# Patient Record
Sex: FEMALE | Race: WHITE | Employment: OTHER | URBAN - METROPOLITAN AREA
[De-identification: names, ages, dates, MRNs, and addresses within clinical notes are randomized per-mention and may not be internally consistent; named-entity substitution may affect disease eponyms.]

---

## 2017-12-07 ENCOUNTER — OFFICE VISIT (OUTPATIENT)
Dept: INTERNAL MEDICINE CLINIC | Age: 82
End: 2017-12-07

## 2017-12-07 VITALS
HEART RATE: 67 BPM | RESPIRATION RATE: 14 BRPM | DIASTOLIC BLOOD PRESSURE: 78 MMHG | OXYGEN SATURATION: 98 % | SYSTOLIC BLOOD PRESSURE: 190 MMHG | HEIGHT: 56 IN | BODY MASS INDEX: 28.12 KG/M2 | TEMPERATURE: 97.8 F | WEIGHT: 125 LBS

## 2017-12-07 DIAGNOSIS — R53.83 FATIGUE, UNSPECIFIED TYPE: ICD-10-CM

## 2017-12-07 DIAGNOSIS — I10 ESSENTIAL HYPERTENSION: Primary | ICD-10-CM

## 2017-12-07 DIAGNOSIS — G45.9 TRANSIENT CEREBRAL ISCHEMIA, UNSPECIFIED TYPE: ICD-10-CM

## 2017-12-07 DIAGNOSIS — M81.0 AGE-RELATED OSTEOPOROSIS WITHOUT CURRENT PATHOLOGICAL FRACTURE: ICD-10-CM

## 2017-12-07 RX ORDER — ASPIRIN 325 MG
TABLET, DELAYED RELEASE (ENTERIC COATED) ORAL
COMMUNITY
Start: 2017-11-21 | End: 2018-05-29 | Stop reason: SDUPTHER

## 2017-12-07 RX ORDER — IPRATROPIUM BROMIDE 42 UG/1
SPRAY, METERED NASAL
COMMUNITY
Start: 2017-09-10 | End: 2017-12-07 | Stop reason: ALTCHOICE

## 2017-12-07 RX ORDER — CHOLECALCIFEROL (VITAMIN D3) 125 MCG
CAPSULE ORAL DAILY
COMMUNITY

## 2017-12-07 RX ORDER — ATORVASTATIN CALCIUM 20 MG/1
20 TABLET, FILM COATED ORAL DAILY
Qty: 90 TAB | Refills: 3 | Status: SHIPPED | OUTPATIENT
Start: 2017-12-07 | End: 2018-11-18 | Stop reason: SDUPTHER

## 2017-12-07 RX ORDER — VALSARTAN 80 MG/1
80 TABLET ORAL DAILY
COMMUNITY
Start: 2017-11-14 | End: 2017-12-07 | Stop reason: SDUPTHER

## 2017-12-07 RX ORDER — VALSARTAN 160 MG/1
160 TABLET ORAL DAILY
Qty: 90 TAB | Refills: 3 | Status: SHIPPED | OUTPATIENT
Start: 2017-12-07 | End: 2018-07-12 | Stop reason: SDUPTHER

## 2017-12-07 RX ORDER — MINERAL OIL
180 ENEMA (ML) RECTAL DAILY
COMMUNITY
Start: 2017-11-01 | End: 2018-07-12

## 2017-12-07 NOTE — MR AVS SNAPSHOT
Visit Information Date & Time Provider Department Dept. Phone Encounter #  
 12/7/2017 11:00 AM Sharifa Addison MD Lanette Aguilera [phone] [id number] Follow-up Instructions Return in about 1 month (around 1/7/2018) for bp. Upcoming Health Maintenance Date Due DTaP/Tdap/Td series (1 - Tdap) 6/19/1951 ZOSTER VACCINE AGE 60> 4/19/1990 GLAUCOMA SCREENING Q2Y 6/19/1995 Pneumococcal 65+ Low/Medium Risk (1 of 2 - PCV13) 6/19/1995 MEDICARE YEARLY EXAM 6/19/1995 Allergies as of 12/7/2017  Review Complete On: 12/7/2017 By: Sharifa Addison MD  
 No Known Allergies Current Immunizations  Reviewed on 3/11/2013 No immunizations on file. Not reviewed this visit You Were Diagnosed With   
  
 Codes Comments BMI 28.0-28.9,adult    -  Primary ICD-10-CM: H60.31 
ICD-9-CM: V85.24 Transient cerebral ischemia, unspecified type     ICD-10-CM: G45.9 ICD-9-CM: 435.9 Essential hypertension     ICD-10-CM: I10 
ICD-9-CM: 401.9 Age-related osteoporosis without current pathological fracture     ICD-10-CM: M81.0 ICD-9-CM: 733.01 Fatigue, unspecified type     ICD-10-CM: R53.83 ICD-9-CM: 780.79 Vitals BP Pulse Temp Resp Height(growth percentile) Weight(growth percentile) 190/78 67 97.8 °F (36.6 °C) (Oral) 14 4' 8\" (1.422 m) 125 lb (56.7 kg) SpO2 BMI OB Status Smoking Status 98% 28.02 kg/m2 Postmenopausal Never Smoker Vitals History BMI and BSA Data Body Mass Index Body Surface Area 28.02 kg/m 2 1.5 m 2 Preferred Pharmacy Pharmacy Name Phone [address] [facility] [phone] Your Updated Medication List  
  
   
This list is accurate as of: 12/7/17 12:27 PM.  Always use your most recent med list.  
  
  
  
  
 aspirin delayed-release 325 mg tablet  
  
 atorvastatin 20 mg tablet Commonly known as:  LIPITOR Take 1 Tab by mouth daily. BYSTOLIC 5 mg tablet Generic drug:  nebivolol Take 5 mg by mouth daily. calcium citrate 200 mg (950 mg) tablet Take 600 mg by mouth daily. CeleBREX 200 mg capsule Generic drug:  celecoxib Take 200 mg by mouth daily. fexofenadine 180 mg tablet Commonly known as:  Selvin Frank Take 180 mg by mouth daily. LASIX 20 mg tablet Generic drug:  furosemide Take 20 mg by mouth as needed. Indications: EDEMA  
  
 omeprazole 20 mg capsule Commonly known as:  PRILOSEC Take 20 mg by mouth daily. valsartan 160 mg tablet Commonly known as:  DIOVAN Take 1 Tab by mouth daily. VITAMIN D3 2,000 unit Tab Generic drug:  cholecalciferol (vitamin D3) Take  by mouth daily. Prescriptions Sent to Pharmacy Refills  
 atorvastatin (LIPITOR) 20 mg tablet 3 Sig: Take 1 Tab by mouth daily. Class: Normal  
 Pharmacy: [address], [address] Ph #: [phone] Route: Oral  
 valsartan (DIOVAN) 160 mg tablet 3 Sig: Take 1 Tab by mouth daily. Class: Normal  
 Pharmacy: [address], [address] Ph #: [phone] Route: Oral  
  
Follow-up Instructions Return in about 1 month (around 1/7/2018) for bp. To-Do List   
 12/08/2017 Lab:  CBC WITH AUTOMATED DIFF   
  
 12/08/2017 Lab:  LIPID PANEL   
  
 12/08/2017 Lab:  METABOLIC PANEL, COMPREHENSIVE   
  
 12/08/2017 Lab:  TSH 3RD GENERATION Patient Instructions High Blood Pressure: Care Instructions Your Care Instructions If your blood pressure is usually above 140/90, you have high blood pressure, or hypertension. That means the top number is 140 or higher or the bottom number is 90 or higher, or both.  
Despite what a lot of people think, high blood pressure usually doesn't cause headaches or make you feel dizzy or lightheaded. It usually has no symptoms. But it does increase your risk for heart attack, stroke, and kidney or eye damage. The higher your blood pressure, the more your risk increases. Your doctor will give you a goal for your blood pressure. Your goal will be based on your health and your age. An example of a goal is to keep your blood pressure below 140/90. Lifestyle changes, such as eating healthy and being active, are always important to help lower blood pressure. You might also take medicine to reach your blood pressure goal. 
Follow-up care is a key part of your treatment and safety. Be sure to make and go to all appointments, and call your doctor if you are having problems. It's also a good idea to know your test results and keep a list of the medicines you take. How can you care for yourself at home? Medical treatment · If you stop taking your medicine, your blood pressure will go back up. You may take one or more types of medicine to lower your blood pressure. Be safe with medicines. Take your medicine exactly as prescribed. Call your doctor if you think you are having a problem with your medicine. · Talk to your doctor before you start taking aspirin every day. Aspirin can help certain people lower their risk of a heart attack or stroke. But taking aspirin isn't right for everyone, because it can cause serious bleeding. · See your doctor regularly. You may need to see the doctor more often at first or until your blood pressure comes down. · If you are taking blood pressure medicine, talk to your doctor before you take decongestants or anti-inflammatory medicine, such as ibuprofen. Some of these medicines can raise blood pressure. · Learn how to check your blood pressure at home. Lifestyle changes · Stay at a healthy weight. This is especially important if you put on weight around the waist. Losing even 10 pounds can help you lower your blood pressure. · If your doctor recommends it, get more exercise. Walking is a good choice. Bit by bit, increase the amount you walk every day. Try for at least 30 minutes on most days of the week. You also may want to swim, bike, or do other activities. · Avoid or limit alcohol. Talk to your doctor about whether you can drink any alcohol. · Try to limit how much sodium you eat to less than 2,300 milligrams (mg) a day. Your doctor may ask you to try to eat less than 1,500 mg a day. · Eat plenty of fruits (such as bananas and oranges), vegetables, legumes, whole grains, and low-fat dairy products. · Lower the amount of saturated fat in your diet. Saturated fat is found in animal products such as milk, cheese, and meat. Limiting these foods may help you lose weight and also lower your risk for heart disease. · Do not smoke. Smoking increases your risk for heart attack and stroke. If you need help quitting, talk to your doctor about stop-smoking programs and medicines. These can increase your chances of quitting for good. When should you call for help? Call 911 anytime you think you may need emergency care. This may mean having symptoms that suggest that your blood pressure is causing a serious heart or blood vessel problem. Your blood pressure may be over 180/110. ? For example, call 911 if: 
? · You have symptoms of a heart attack. These may include: ¨ Chest pain or pressure, or a strange feeling in the chest. 
¨ Sweating. ¨ Shortness of breath. ¨ Nausea or vomiting. ¨ Pain, pressure, or a strange feeling in the back, neck, jaw, or upper belly or in one or both shoulders or arms. ¨ Lightheadedness or sudden weakness. ¨ A fast or irregular heartbeat. ? · You have symptoms of a stroke. These may include: 
¨ Sudden numbness, tingling, weakness, or loss of movement in your face, arm, or leg, especially on only one side of your body. ¨ Sudden vision changes. ¨ Sudden trouble speaking. ¨ Sudden confusion or trouble understanding simple statements. ¨ Sudden problems with walking or balance. ¨ A sudden, severe headache that is different from past headaches. ? · You have severe back or belly pain. ?Do not wait until your blood pressure comes down on its own. Get help right away. ?Call your doctor now or seek immediate care if: 
? · Your blood pressure is much higher than normal (such as 180/110 or higher), but you don't have symptoms. ? · You think high blood pressure is causing symptoms, such as: ¨ Severe headache. ¨ Blurry vision. ? Watch closely for changes in your health, and be sure to contact your doctor if: 
? · Your blood pressure measures 140/90 or higher at least 2 times. That means the top number is 140 or higher or the bottom number is 90 or higher, or both. ? · You think you may be having side effects from your blood pressure medicine. ? · Your blood pressure is usually normal, but it goes above normal at least 2 times. Where can you learn more? Go to http://zari-ivory.info/. Enter F967 in the search box to learn more about \"High Blood Pressure: Care Instructions. \" Current as of: September 21, 2016 Content Version: 11.4 © 9823-2464 [employer]. Care instructions adapted under license by [employer] (which disclaims liability or warranty for this information). If you have questions about a medical condition or this instruction, always ask your healthcare professional. [place] [address] any warranty or liability for your use of this information. Introducing [facility]! Dear Shaggy Waldrop: 
Thank you for requesting a [employer] account. Our records indicate that you have previously registered for a [employer] account but its currently inactive. Please call our [employer] support line at [phone]. Additional Information If you have questions, please visit the Frequently Asked Questions section of the [employer]TV website at [email or web address] Remember, tweetTV is NOT to be used for urgent needs. For medical emergencies, dial 911. Now available from your iPhone and Android! Please provide this summary of care documentation to your next provider. Your primary care clinician is listed as Mery Aden. If you have any questions after today's visit, please call [phone].

## 2017-12-07 NOTE — PATIENT INSTRUCTIONS
High Blood Pressure: Care Instructions  Your Care Instructions    If your blood pressure is usually above 140/90, you have high blood pressure, or hypertension. That means the top number is 140 or higher or the bottom number is 90 or higher, or both. Despite what a lot of people think, high blood pressure usually doesn't cause headaches or make you feel dizzy or lightheaded. It usually has no symptoms. But it does increase your risk for heart attack, stroke, and kidney or eye damage. The higher your blood pressure, the more your risk increases. Your doctor will give you a goal for your blood pressure. Your goal will be based on your health and your age. An example of a goal is to keep your blood pressure below 140/90. Lifestyle changes, such as eating healthy and being active, are always important to help lower blood pressure. You might also take medicine to reach your blood pressure goal.  Follow-up care is a key part of your treatment and safety. Be sure to make and go to all appointments, and call your doctor if you are having problems. It's also a good idea to know your test results and keep a list of the medicines you take. How can you care for yourself at home? Medical treatment  · If you stop taking your medicine, your blood pressure will go back up. You may take one or more types of medicine to lower your blood pressure. Be safe with medicines. Take your medicine exactly as prescribed. Call your doctor if you think you are having a problem with your medicine. · Talk to your doctor before you start taking aspirin every day. Aspirin can help certain people lower their risk of a heart attack or stroke. But taking aspirin isn't right for everyone, because it can cause serious bleeding. · See your doctor regularly. You may need to see the doctor more often at first or until your blood pressure comes down.   · If you are taking blood pressure medicine, talk to your doctor before you take decongestants or anti-inflammatory medicine, such as ibuprofen. Some of these medicines can raise blood pressure. · Learn how to check your blood pressure at home. Lifestyle changes  · Stay at a healthy weight. This is especially important if you put on weight around the waist. Losing even 10 pounds can help you lower your blood pressure. · If your doctor recommends it, get more exercise. Walking is a good choice. Bit by bit, increase the amount you walk every day. Try for at least 30 minutes on most days of the week. You also may want to swim, bike, or do other activities. · Avoid or limit alcohol. Talk to your doctor about whether you can drink any alcohol. · Try to limit how much sodium you eat to less than 2,300 milligrams (mg) a day. Your doctor may ask you to try to eat less than 1,500 mg a day. · Eat plenty of fruits (such as bananas and oranges), vegetables, legumes, whole grains, and low-fat dairy products. · Lower the amount of saturated fat in your diet. Saturated fat is found in animal products such as milk, cheese, and meat. Limiting these foods may help you lose weight and also lower your risk for heart disease. · Do not smoke. Smoking increases your risk for heart attack and stroke. If you need help quitting, talk to your doctor about stop-smoking programs and medicines. These can increase your chances of quitting for good. When should you call for help? Call 911 anytime you think you may need emergency care. This may mean having symptoms that suggest that your blood pressure is causing a serious heart or blood vessel problem. Your blood pressure may be over 180/110. ? For example, call 911 if:  ? · You have symptoms of a heart attack. These may include:  ¨ Chest pain or pressure, or a strange feeling in the chest.  ¨ Sweating. ¨ Shortness of breath. ¨ Nausea or vomiting.   ¨ Pain, pressure, or a strange feeling in the back, neck, jaw, or upper belly or in one or both shoulders or arms.  ¨ Lightheadedness or sudden weakness. ¨ A fast or irregular heartbeat. ? · You have symptoms of a stroke. These may include:  ¨ Sudden numbness, tingling, weakness, or loss of movement in your face, arm, or leg, especially on only one side of your body. ¨ Sudden vision changes. ¨ Sudden trouble speaking. ¨ Sudden confusion or trouble understanding simple statements. ¨ Sudden problems with walking or balance. ¨ A sudden, severe headache that is different from past headaches. ? · You have severe back or belly pain. ?Do not wait until your blood pressure comes down on its own. Get help right away. ?Call your doctor now or seek immediate care if:  ? · Your blood pressure is much higher than normal (such as 180/110 or higher), but you don't have symptoms. ? · You think high blood pressure is causing symptoms, such as:  ¨ Severe headache. ¨ Blurry vision. ? Watch closely for changes in your health, and be sure to contact your doctor if:  ? · Your blood pressure measures 140/90 or higher at least 2 times. That means the top number is 140 or higher or the bottom number is 90 or higher, or both. ? · You think you may be having side effects from your blood pressure medicine. ? · Your blood pressure is usually normal, but it goes above normal at least 2 times. Where can you learn more? Go to http://zari-ivory.info/. Enter U350 in the search box to learn more about \"High Blood Pressure: Care Instructions. \"  Current as of: September 21, 2016  Content Version: 11.4  © 0066-2720 [email or web address]. Care instructions adapted under license by [employer] (which disclaims liability or warranty for this information). If you have questions about a medical condition or this instruction, always ask your healthcare professional. [place] [address] any warranty or liability for your use of this information.

## 2017-12-07 NOTE — PROGRESS NOTES
Juliane Bui  Identified pt with two pt identifiers(name and ). Chief Complaint   Patient presents with   [id number]// NON fasting // Dr Maurilio Degroot, prior PCP    Headache       1. Have you been to the ER, urgent care clinic since your last visit? Hospitalized since your last visit? NO    2. Have you seen or consulted any other health care providers outside of the [address] [place] [address] since your last visit? Dr Buffy Thomas, eye  Hx aneurysm R eye    Dr Basia Graham notified of reason for visit and vitals    Patient received paperwork for advance directive during previous visit but has not completed at this time     Reviewed record In preparation for visit, huddled with provider and have obtained necessary documentation      Health Maintenance Due   Topic    DTaP/Tdap/Td series (1 - Tdap)    ZOSTER VACCINE AGE 60>     GLAUCOMA SCREENING Q2Y     Pneumococcal 65+ Low/Medium Risk (1 of 2 - PCV13)    MEDICARE YEARLY EXAM        Wt Readings from Last 3 Encounters:   17 125 lb (56.7 kg)   11/10/15 114 lb (51.7 kg)   14 114 lb (51.7 kg)     Temp Readings from Last 3 Encounters:   14 98 °F (36.7 °C)   13 98.8 °F (37.1 °C)   12 97.9 °F (36.6 °C)     BP Readings from Last 3 Encounters:   11/10/15 175/72   14 151/54   13 117/59     Pulse Readings from Last 3 Encounters:   11/10/15 93   14 76   13 (!) 109         Learning Assessment:  :     No flowsheet data found. Depression Screening:  :     No flowsheet data found. Fall Risk Assessment:  :     No flowsheet data found. Abuse Screening:  :     No flowsheet data found. Patient is accompanied by daughter. I have received verbal consent from [address] [place] [address] to discuss any/all medical information while they are present in the room. Medication reconciliation up to date and corrected with patient at this time.

## 2017-12-07 NOTE — PROGRESS NOTES
HPI  Ms. Inessa Andujar is a 80y.o. year old female, she is seen today to establish care, accompanied by her daughter. PMH significant for TIA, HTN. TIA was manifest as numbness around mouth - resolved now and hasn't returned. Says has CARMEN for years, resolves with resting. No chest pain associated and no diaphoresis, nausea either. Has pain in low back b/l and ribs for years, takes tylenol rarely which helps pain. Pain is improved since extensive back surgery. Has had more HA in last week. HA feels like a band around her head, no vision changes that are new but had \"aneurysm\" right eye - diagnosed around  - followed by Dr. Devon Bacon - routine eye exams with Dr. Buffy Thomas. Will take tylenol or excedrin prn - may need for 2-3 days in a row then not again for a couple of weeks. Worry will bring on HA. Especially worries about her  with parkinson's, often aspirates and this is stressful. BP at home is in range 140-170/. Has had atorvastatin in the past - . Seems this was started after diagnosis TIA. Has been  79 years. Has heartburn intermittently- worse with certain foods. Has gained 10# in the last year since she moved in with her daughter as she is eating better   Coughs frequently for years, unchanged, also postnasal drip. May feel tired, no focal weakness. Only takes lasix rarely for swelling in legs - none recently. Chief Complaint   Patient presents with   [id number]// NON fasting // Dr Maurilio Degroot, prior PCP (records requested)     Headache        Prior to Admission medications    Medication Sig Start Date End Date Taking? Authorizing Provider   fexofenadine (ALLEGRA) 180 mg tablet Take 180 mg by mouth daily. 17  Yes Historical Provider   aspirin delayed-release 325 mg tablet  17  Yes Historical Provider   valsartan (DIOVAN) 80 mg tablet Take 80 mg by mouth daily.  17  Yes Historical Provider   cholecalciferol, vitamin D3, (VITAMIN D3) 2,000 unit tab Take  by mouth daily. Yes Historical Provider   celecoxib (CELEBREX) 200 mg capsule Take 200 mg by mouth daily. Yes Historical Provider   calcium citrate 200 mg (950 mg) tablet Take 600 mg by mouth daily. Yes Historical Provider   alendronate (FOSAMAX) 70 mg tablet Take 70 mg by mouth every Sunday. Yes Historical Provider   nebivolol (BYSTOLIC) 5 mg tablet Take 5 mg by mouth daily. Yes Historical Provider   omeprazole (PRILOSEC) 20 mg capsule Take 20 mg by mouth daily. Yes Historical Provider   furosemide (LASIX) 20 mg tablet Take 20 mg by mouth as needed. Indications: EDEMA   Yes Historical Provider   HYDROcodone-acetaminophen (NORCO) 5-325 mg per tablet Take 1 Tab by mouth every four (4) hours as needed. 3/26/14   Kae Taveras NP         No Known Allergies      REVIEW OF SYSTEMS:  Per HPI    PHYSICAL EXAM:  Visit Vitals    /78    Pulse 67    Temp 97.8 °F (36.6 °C) (Oral)    Resp 14    Ht 4' 8\" (1.422 m)    Wt 125 lb (56.7 kg)    SpO2 98%    BMI 28.02 kg/m2     Constitutional: Appears well-developed and well-nourished. No distress. HENT:   Head: Normocephalic and atraumatic. Eyes: No scleral icterus. PERRL  Mouth: OP clear without lesions, no pharyngeal exudate  Neck: no lad, no tm, supple   Cardiovascular: Normal S1/S2, regular rhythm. No murmurs, rubs, or gallops. Pulmonary/Chest: Effort normal and breath sounds normal. No respiratory distress. No wheezes, rhonchi, or rales. Abdomen: Soft, NT/ND, +BS, no rebound or guarding, no masses, no HSM appreciated. Back: no pain on palpation, +well healed vertical scar  Ext: No edema. 2+ pedal pulses. Neurological: Alert. Strength 5/5 b/l UE and LE. Psychiatric: Normal mood and affect.  Behavior is normal.     Lab Results   Component Value Date/Time    Sodium 139 03/26/2014 05:00 AM    Potassium 4.1 03/26/2014 05:00 AM    Chloride 107 03/26/2014 05:00 AM    CO2 23 03/26/2014 05:00 AM    Anion gap 9 03/26/2014 05:00 AM    Glucose 131 03/26/2014 05:00 AM    BUN 17 03/26/2014 05:00 AM    Creatinine 0.42 03/26/2014 05:00 AM    BUN/Creatinine ratio 40 03/26/2014 05:00 AM    GFR est AA >60 03/26/2014 05:00 AM    GFR est non-AA >60 03/26/2014 05:00 AM    Calcium 8.1 03/26/2014 05:00 AM    Bilirubin, total 1.2 03/12/2013 03:45 AM    AST (SGOT) 26 03/12/2013 03:45 AM    Alk. phosphatase 99 03/12/2013 03:45 AM    Protein, total 5.6 03/12/2013 03:45 AM    Albumin 2.3 03/12/2013 03:45 AM    Globulin 3.3 03/12/2013 03:45 AM    A-G Ratio 0.7 03/12/2013 03:45 AM    ALT (SGPT) 28 03/12/2013 03:45 AM     Lab Results   Component Value Date/Time    Hemoglobin A1c 5.1 03/07/2014 01:35 PM    Hemoglobin A1c 5.4 02/25/2013 02:09 PM      No results found for: CHOL, CHOLPOCT, CHOLX, CHLST, CHOLV, HDL, HDLPOC, LDL, LDLCPOC, LDLC, DLDLP, VLDLC, VLDL, TGLX, TRIGL, TRIGP, TGLPOCT, CHHD, CHHDX       ASSESSMENT/PLAN  Diagnoses and all orders for this visit:    1. Essential hypertension  -     METABOLIC PANEL, COMPREHENSIVE; Future  -     LIPID PANEL; Future  -     valsartan (DIOVAN) 160 mg tablet; Take 1 Tab by mouth daily. Not controlled, increase diovan as above - with h/o TIA needs good BP control - also restart lipitor to prevent recurrence TIA  2. Transient cerebral ischemia, unspecified type  -     atorvastatin (LIPITOR) 20 mg tablet; Take 1 Tab by mouth daily. 3. BMI 28.0-28.9,adult  Acceptable for age  3. Age-related osteoporosis without current pathological fracture    5. Fatigue, unspecified type  -     CBC WITH AUTOMATED DIFF; Future  -     TSH 3RD GENERATION;  Future  Check labs, may be due to high BP, age        Health Maintenance Due   Topic Date Due    DTaP/Tdap/Td series (1 - Tdap) 06/19/1951    ZOSTER VACCINE AGE 60>  04/19/1990    GLAUCOMA SCREENING Q2Y  06/19/1995    Pneumococcal 65+ Low/Medium Risk (1 of 2 - PCV13) 06/19/1995    MEDICARE YEARLY EXAM  06/19/1995        Follow-up Disposition:  Return in about 1 month (around 1/7/2018) for bp. Reviewed plan of care. Patient has provided input and agrees with goals. The nurse provided the patient and/or family with advanced directive information if needed and encouraged the patient to provide a copy to the office when available.

## 2017-12-13 ENCOUNTER — APPOINTMENT (OUTPATIENT)
Dept: INTERNAL MEDICINE CLINIC | Age: 82
End: 2017-12-13

## 2017-12-13 DIAGNOSIS — I10 ESSENTIAL HYPERTENSION: ICD-10-CM

## 2017-12-13 DIAGNOSIS — R53.83 FATIGUE, UNSPECIFIED TYPE: ICD-10-CM

## 2017-12-14 ENCOUNTER — PATIENT OUTREACH (OUTPATIENT)
Dept: INTERNAL MEDICINE CLINIC | Age: 82
End: 2017-12-14

## 2017-12-14 ENCOUNTER — OFFICE VISIT (OUTPATIENT)
Dept: INTERNAL MEDICINE CLINIC | Age: 82
End: 2017-12-14

## 2017-12-14 DIAGNOSIS — Z71.89 ADVANCE CARE PLANNING: Primary | ICD-10-CM

## 2017-12-14 LAB
ALBUMIN SERPL-MCNC: 4 G/DL (ref 3.5–4.7)
ALBUMIN/GLOB SERPL: 1.5 {RATIO} (ref 1.2–2.2)
ALP SERPL-CCNC: 88 IU/L (ref 39–117)
ALT SERPL-CCNC: 11 IU/L (ref 0–32)
AST SERPL-CCNC: 20 IU/L (ref 0–40)
BASOPHILS # BLD AUTO: 0.1 X10E3/UL (ref 0–0.2)
BASOPHILS NFR BLD AUTO: 1 %
BILIRUB SERPL-MCNC: 0.7 MG/DL (ref 0–1.2)
BUN SERPL-MCNC: 24 MG/DL (ref 8–27)
BUN/CREAT SERPL: 24 (ref 12–28)
CALCIUM SERPL-MCNC: 9.1 MG/DL (ref 8.7–10.3)
CHLORIDE SERPL-SCNC: 102 MMOL/L (ref 96–106)
CHOLEST SERPL-MCNC: 194 MG/DL (ref 100–199)
CO2 SERPL-SCNC: 27 MMOL/L (ref 18–29)
CREAT SERPL-MCNC: 1 MG/DL (ref 0.57–1)
EOSINOPHIL # BLD AUTO: 0.3 X10E3/UL (ref 0–0.4)
EOSINOPHIL NFR BLD AUTO: 5 %
ERYTHROCYTE [DISTWIDTH] IN BLOOD BY AUTOMATED COUNT: 13.1 % (ref 12.3–15.4)
GFR SERPLBLD CREATININE-BSD FMLA CKD-EPI: 51 ML/MIN/1.73
GFR SERPLBLD CREATININE-BSD FMLA CKD-EPI: 59 ML/MIN/1.73
GLOBULIN SER CALC-MCNC: 2.6 G/DL (ref 1.5–4.5)
GLUCOSE SERPL-MCNC: 90 MG/DL (ref 65–99)
HCT VFR BLD AUTO: 36.6 % (ref 34–46.6)
HDLC SERPL-MCNC: 70 MG/DL
HGB BLD-MCNC: 11.6 G/DL (ref 11.1–15.9)
IMM GRANULOCYTES # BLD: 0 X10E3/UL (ref 0–0.1)
IMM GRANULOCYTES NFR BLD: 0 %
INTERPRETATION, 910389: NORMAL
INTERPRETATION: NORMAL
LDLC SERPL CALC-MCNC: 104 MG/DL (ref 0–99)
LYMPHOCYTES # BLD AUTO: 1.4 X10E3/UL (ref 0.7–3.1)
LYMPHOCYTES NFR BLD AUTO: 25 %
MCH RBC QN AUTO: 30.1 PG (ref 26.6–33)
MCHC RBC AUTO-ENTMCNC: 31.7 G/DL (ref 31.5–35.7)
MCV RBC AUTO: 95 FL (ref 79–97)
MONOCYTES # BLD AUTO: 0.6 X10E3/UL (ref 0.1–0.9)
MONOCYTES NFR BLD AUTO: 11 %
NEUTROPHILS # BLD AUTO: 3.3 X10E3/UL (ref 1.4–7)
NEUTROPHILS NFR BLD AUTO: 58 %
PDF IMAGE, 910387: NORMAL
PLATELET # BLD AUTO: 213 X10E3/UL (ref 150–379)
POTASSIUM SERPL-SCNC: 4.7 MMOL/L (ref 3.5–5.2)
PROT SERPL-MCNC: 6.6 G/DL (ref 6–8.5)
RBC # BLD AUTO: 3.85 X10E6/UL (ref 3.77–5.28)
SODIUM SERPL-SCNC: 142 MMOL/L (ref 134–144)
TRIGL SERPL-MCNC: 101 MG/DL (ref 0–149)
TSH SERPL DL<=0.005 MIU/L-ACNC: 3.02 UIU/ML (ref 0.45–4.5)
VLDLC SERPL CALC-MCNC: 20 MG/DL (ref 5–40)
WBC # BLD AUTO: 5.7 X10E3/UL (ref 3.4–10.8)

## 2017-12-14 NOTE — ACP (ADVANCE CARE PLANNING)
[facility]     Prior to today's conversation, did individual have existing ACP documents? [] Yes  [x] No  I  Date of conversation: 12/14/17 Location: [facility]    Participants:   [x] Patient    [x] Prospective agent (not yet named in a document) / Other surrogate decision maker / next of kin    Name: Burton Buckner    Relationship to patient: daughter    Phone number: [phone]    Other persons present:   Name: Meaghan Samantha    Relationship to patient:    Name: Lennie Adams    Relationship to patient: friend    Individual's Fears/Concerns about planning: none     Goals/Narrative of Conversation: Discussed facilitators role and purpose of the program.  Discussed ACP planning and what it is about along with the patient's understanding of what ACP planning is. Explored and discussed the patient's experiences with family who were seriously ill or injured. Explored and discussed living well and what that means to the patient. Provided a brief overview of the 3 decisions in First Steps ACP. Explored and discussed cultural, Congregational, and personal beliefs that the patient has. Discussed CPR and mechanical ventilation and patient gave their views of how they felt about them. Explored and discussed scenario regarding the patient's goals of care for a severe permanent brain injury. Discussed anatomical gifts with the patient. Explained that in any scenario or decision that is made, the patient will always be made comfortable. Clarified any questions and concerns that the patient had. Conversation topics for Individual    Has learned the following from prior experiences with serious illness: sister was sick, but was alert enough to maker her own decision to withdraw treatment. Identifies the following as important for living well: being able to perform ADL's independently and get around without any issues.     \"What cultural, Congregational, spiritual, or personal beliefs (if any) do you have that might help you choose the care you want, or do not want? \"  Patient response: none    \"Would you like to talk to someone about these beliefs or concerns? \"  Patient response: no      Conversation topics for Agent / Prospective Agent    Understanding of the role of healthcare agent: discussed roles of the agent and the expectations are.     Agent confirms Willingness to:   [x]Yes []No Accept role   [x] Yes []No Talk with individual about his/her goals, values, & preferences   [x] Yes [] No   Follow individual's decisions, even if do not agree with those    decisions   [x]Yes  []No Make decisions in difficult moments    Other questions/concerns about role of agent: none      First Steps® ACP Facilitator: Thai Cason RN    Length (minutes): 105    The following was provided (check all that apply):   [x] Clarification of the goals of ACP    [x] Criteria for choosing a healthcare agent   [x] Written information on the planning process      Healthcare Decision Information Cards:   [x] Help with Breathing Facts   [x] Tube Feeding Facts   [x] CPR Facts      [x] Review of the completion of the advance directive    [] Review of existing advance directive       Meeting Outcomes:   [x] ACP discussion completed   [x] Advance directive form completed   [x] Original of completed advance directive given to patient   [x] Copy given to healthcare agent    [x] Copy scanned to electronic medical record     Follow-up plan:     [] Schedule follow-up conversation to continue planning   [] Referred individual to provider for additional questions/concerns    [] Individual will invite agent/prospective agent to next ACP appointment   [x] Recommended to review completed AMD annually or upon change in health status     [x] This note routed to one or more involved healthcare providers

## 2018-01-16 ENCOUNTER — OFFICE VISIT (OUTPATIENT)
Dept: INTERNAL MEDICINE CLINIC | Age: 83
End: 2018-01-16

## 2018-01-16 VITALS
WEIGHT: 125.2 LBS | RESPIRATION RATE: 14 BRPM | TEMPERATURE: 97.6 F | HEIGHT: 56 IN | OXYGEN SATURATION: 95 % | HEART RATE: 67 BPM | SYSTOLIC BLOOD PRESSURE: 188 MMHG | DIASTOLIC BLOOD PRESSURE: 80 MMHG | BODY MASS INDEX: 28.16 KG/M2

## 2018-01-16 DIAGNOSIS — K21.9 GASTROESOPHAGEAL REFLUX DISEASE WITHOUT ESOPHAGITIS: ICD-10-CM

## 2018-01-16 DIAGNOSIS — M81.0 AGE-RELATED OSTEOPOROSIS WITHOUT CURRENT PATHOLOGICAL FRACTURE: ICD-10-CM

## 2018-01-16 DIAGNOSIS — G45.9 TRANSIENT CEREBRAL ISCHEMIA, UNSPECIFIED TYPE: ICD-10-CM

## 2018-01-16 DIAGNOSIS — I10 ESSENTIAL HYPERTENSION: Primary | ICD-10-CM

## 2018-01-16 RX ORDER — OMEPRAZOLE 20 MG/1
20 CAPSULE, DELAYED RELEASE ORAL DAILY
Qty: 90 CAP | Refills: 3 | Status: SHIPPED | OUTPATIENT
Start: 2018-01-16 | End: 2019-01-13 | Stop reason: SDUPTHER

## 2018-01-16 RX ORDER — NEBIVOLOL 10 MG/1
10 TABLET ORAL DAILY
Qty: 90 TAB | Refills: 1 | Status: SHIPPED | OUTPATIENT
Start: 2018-01-16 | End: 2018-07-15 | Stop reason: SDUPTHER

## 2018-01-16 NOTE — PROGRESS NOTES
HPI  Ms. Any Garcia is a 80y.o. year old female, she is seen today for follow up HTN after increasing losartan to 160mg daily. Brings log and BP at home has been 133-228/ with HR 59-70. Daughter notices shortness of breath when patient is walking up stairs, moving more than a few steps. No chest pain. Occasional HA, not changed and not associated with high bp. Had more leg swelling over the holidays, took lasix and improved but doesn't take daily. Brings her home bp machine which measures 203/79 when I get 188/80. Chief Complaint   Patient presents with    Blood Pressure Check     Room 1// NON fasting         Prior to Admission medications    Medication Sig Start Date End Date Taking? Authorizing Provider   fexofenadine (ALLEGRA) 180 mg tablet Take 180 mg by mouth daily. 11/1/17  Yes Historical Provider   aspirin delayed-release 325 mg tablet  11/21/17  Yes Historical Provider   cholecalciferol, vitamin D3, (VITAMIN D3) 2,000 unit tab Take  by mouth daily. Yes Historical Provider   atorvastatin (LIPITOR) 20 mg tablet Take 1 Tab by mouth daily. 12/7/17  Yes Nurys Cordon MD   valsartan (DIOVAN) 160 mg tablet Take 1 Tab by mouth daily. 12/7/17  Yes Nurys Cordon MD   celecoxib (CELEBREX) 200 mg capsule Take 200 mg by mouth daily. Yes Historical Provider   nebivolol (BYSTOLIC) 5 mg tablet Take 5 mg by mouth daily. Yes Historical Provider   omeprazole (PRILOSEC) 20 mg capsule Take 20 mg by mouth daily. Yes Historical Provider   furosemide (LASIX) 20 mg tablet Take 20 mg by mouth as needed. Indications: EDEMA   Yes Historical Provider   calcium citrate 200 mg (950 mg) tablet Take 600 mg by mouth daily.     Historical Provider         No Known Allergies      REVIEW OF SYSTEMS:  Per HPI    PHYSICAL EXAM:  Visit Vitals    /70 (BP 1 Location: Right arm, BP Patient Position: Sitting)    Pulse 67    Temp 97.6 °F (36.4 °C) (Oral)    Resp 14    Ht 4' 8\" (1.422 m)    Wt 125 lb 3.2 oz (56.8 kg)    SpO2 95%    BMI 28.07 kg/m2     Constitutional: Appears well-developed and well-nourished. No distress. HENT:   Head: Normocephalic and atraumatic. Eyes: No scleral icterus. Cardiovascular: Normal S1/S2, regular rhythm. No murmurs, rubs, or gallops. Pulmonary/Chest: Effort normal and breath sounds normal. No respiratory distress. No wheezes, rhonchi, or rales. Ext: trace left ankle edema. Neurological: Alert. Psychiatric: Normal mood and affect. Behavior is normal.     Lab Results   Component Value Date/Time    Sodium 142 12/13/2017 01:33 PM    Potassium 4.7 12/13/2017 01:33 PM    Chloride 102 12/13/2017 01:33 PM    CO2 27 12/13/2017 01:33 PM    Anion gap 9 03/26/2014 05:00 AM    Glucose 90 12/13/2017 01:33 PM    BUN 24 12/13/2017 01:33 PM    Creatinine 1.00 12/13/2017 01:33 PM    BUN/Creatinine ratio 24 12/13/2017 01:33 PM    GFR est AA 59 12/13/2017 01:33 PM    GFR est non-AA 51 12/13/2017 01:33 PM    Calcium 9.1 12/13/2017 01:33 PM    Bilirubin, total 0.7 12/13/2017 01:33 PM    AST (SGOT) 20 12/13/2017 01:33 PM    Alk. phosphatase 88 12/13/2017 01:33 PM    Protein, total 6.6 12/13/2017 01:33 PM    Albumin 4.0 12/13/2017 01:33 PM    Globulin 3.3 03/12/2013 03:45 AM    A-G Ratio 1.5 12/13/2017 01:33 PM    ALT (SGPT) 11 12/13/2017 01:33 PM     Lab Results   Component Value Date/Time    Hemoglobin A1c 5.1 03/07/2014 01:35 PM    Hemoglobin A1c 5.4 02/25/2013 02:09 PM      Lab Results   Component Value Date/Time    Cholesterol, total 194 12/13/2017 01:33 PM    HDL Cholesterol 70 12/13/2017 01:33 PM    LDL, calculated 104 12/13/2017 01:33 PM    VLDL, calculated 20 12/13/2017 01:33 PM    Triglyceride 101 12/13/2017 01:33 PM          ASSESSMENT/PLAN  Diagnoses and all orders for this visit:    1. Essential hypertension  -     nebivolol (BYSTOLIC) 10 mg tablet; Take 1 Tab by mouth daily.   Not controlled, her machine reads slightly higher than manual in office but still high - increase bystolic as above and take lasix daily as may have component of fluid overload as well  2. Age-related osteoporosis without current pathological fracture  On fosamax for greater than 5 years and recently stopped by me  3. Transient cerebral ischemia, unspecified type    4. Gastroesophageal reflux disease without esophagitis  -     omeprazole (PRILOSEC) 20 mg capsule; Take 1 Cap by mouth daily. Health Maintenance Due   Topic Date Due    DTaP/Tdap/Td series (1 - Tdap) 06/19/1951    ZOSTER VACCINE AGE 60>  04/19/1990    GLAUCOMA SCREENING Q2Y  06/19/1995    Pneumococcal 65+ Low/Medium Risk (1 of 2 - PCV13) 06/19/1995    MEDICARE YEARLY EXAM  06/19/1995        Follow-up Disposition:  Return in about 1 month (around 2/16/2018) for bp. Reviewed plan of care. Patient has provided input and agrees with goals. The nurse provided the patient and/or family with advanced directive information if needed and encouraged the patient to provide a copy to the office when available.

## 2018-01-16 NOTE — MR AVS SNAPSHOT
[address] [facility] [address] [phone] Patient: Dg Aguirre MRN: [id number] MHL:9/34/[id number] Visit Information Date & Time Provider Department Dept. Phone Encounter #  
 1/16/2018 12:45 PM Ammon Doty MD [facility] [phone] [id number] Follow-up Instructions Return in about 1 month (around 2/16/2018) for bp. Upcoming Health Maintenance Date Due DTaP/Tdap/Td series (1 - Tdap) 6/19/1951 ZOSTER VACCINE AGE 60> 4/19/1990 GLAUCOMA SCREENING Q2Y 6/19/1995 Pneumococcal 65+ Low/Medium Risk (1 of 2 - PCV13) 6/19/1995 MEDICARE YEARLY EXAM 6/19/1995 Allergies as of 1/16/2018  Review Complete On: 1/16/2018 By: Ammon Doty MD  
 No Known Allergies Current Immunizations  Reviewed on 3/11/2013 No immunizations on file. Not reviewed this visit You Were Diagnosed With   
  
 Codes Comments Essential hypertension    -  Primary ICD-10-CM: I10 
ICD-9-CM: 401.9 Age-related osteoporosis without current pathological fracture     ICD-10-CM: M81.0 ICD-9-CM: 733.01 Transient cerebral ischemia, unspecified type     ICD-10-CM: G45.9 ICD-9-CM: 435.9 Gastroesophageal reflux disease without esophagitis     ICD-10-CM: K21.9 ICD-9-CM: 530.81 Vitals BP Pulse Temp Resp Height(growth percentile) Weight(growth percentile) 188/80 67 97.6 °F (36.4 °C) (Oral) 14 4' 8\" (1.422 m) 125 lb 3.2 oz (56.8 kg) SpO2 BMI OB Status Smoking Status 95% 28.07 kg/m2 Postmenopausal Never Smoker Vitals History BMI and BSA Data Body Mass Index Body Surface Area 28.07 kg/m 2 1.5 m 2 [phone] Your Updated Medication List  
  
   
This list is accurate as of: 1/16/18  1:23 PM.  Always use your most recent med list.  
  
  
  
  
 aspirin delayed-release 325 mg tablet  
  
 atorvastatin 20 mg tablet Commonly known as:  LIPITOR Take 1 Tab by mouth daily. calcium citrate 200 mg (950 mg) tablet Take 600 mg by mouth daily. CeleBREX 200 mg capsule Generic drug:  celecoxib Take 200 mg by mouth daily. fexofenadine 180 mg tablet Commonly known as:  Rubens Sportsman Take 180 mg by mouth daily. LASIX 20 mg tablet Generic drug:  furosemide Take 20 mg by mouth as needed. Indications: EDEMA  
  
 nebivolol 10 mg tablet Commonly known as:  BYSTOLIC Take 1 Tab by mouth daily. omeprazole 20 mg capsule Commonly known as:  PRILOSEC Take 1 Cap by mouth daily. valsartan 160 mg tablet Commonly known as:  DIOVAN Take 1 Tab by mouth daily. VITAMIN D3 2,000 unit Tab Generic drug:  cholecalciferol (vitamin D3) Take  by mouth daily. Prescriptions Sent to Pharmacy Refills  
 omeprazole (PRILOSEC) 20 mg capsule 3 Sig: Take 1 Cap by mouth daily. Class: Normal  
 Pharmacy: [address], [address] Ph #: [phone] Route: Oral  
 nebivolol (BYSTOLIC) 10 mg tablet 1 Sig: Take 1 Tab by mouth daily. Class: Normal  
 Pharmacy: [address], [address] Ph #: [phone] Route: Oral  
  
Follow-up Instructions Return in about 1 month (around 2/16/2018) for bp. [facility]! Dear Jose A Bocanegra: 
Thank you for requesting a Pathwright account. Our records indicate that you already have an active Pathwright account. You can access your account anytime at [email or web address] Did you know that you can access your hospital and ER discharge instructions at any time in Pathwright? You can also review all of your test results from your hospital stay or ER visit. Additional Information If you have questions, please visit the Frequently Asked Questions section of the [email or web address] website at [email or web address] Remember, Utan is NOT to be used for urgent needs. For medical emergencies, dial 911. Now available from your iPhone and Android! Please provide this summary of care documentation to your next provider. Your primary care clinician is listed as Mery Aden. If you have any questions after today's visit, please call [phone].

## 2018-01-16 NOTE — PROGRESS NOTES
Juliane Bui  Identified pt with two pt identifiers(name and ). Chief Complaint   Patient presents with    Blood Pressure Check     Room 1// NON fasting        1. Have you been to the ER, urgent care clinic since your last visit? Hospitalized since your last visit? NO    2. Have you seen or consulted any other health care providers outside of the [address] [place] [address] since your last visit? Include any pap smears or colon screening. NO      Dr Esther Buck notified of reason for visit, vitals and flowsheets obtained on patients. Patient received paperwork for advance directive during previous visit but has not completed at this time     Reviewed record In preparation for visit, huddled with provider and have obtained necessary documentation      Health Maintenance Due   Topic    DTaP/Tdap/Td series (1 - Tdap)    ZOSTER VACCINE AGE 60>     GLAUCOMA SCREENING Q2Y     Pneumococcal 65+ Low/Medium Risk (1 of 2 - PCV13)    MEDICARE YEARLY EXAM        Wt Readings from Last 3 Encounters:   18 125 lb 3.2 oz (56.8 kg)   17 125 lb (56.7 kg)   11/10/15 114 lb (51.7 kg)     Temp Readings from Last 3 Encounters:   17 97.8 °F (36.6 °C) (Oral)   14 98 °F (36.7 °C)   13 98.8 °F (37.1 °C)     BP Readings from Last 3 Encounters:   17 190/78   11/10/15 175/72   14 151/54     Pulse Readings from Last 3 Encounters:   17 67   11/10/15 93   14 76     Vitals:    18 1247   Resp: 14   Weight: 125 lb 3.2 oz (56.8 kg)   Height: 4' 8\" (1.422 m)   PainSc:   0 - No pain         Learning Assessment:  :     No flowsheet data found. Depression Screening:  :     No flowsheet data found. Fall Risk Assessment:  :     No flowsheet data found. Abuse Screening:  :     No flowsheet data found. ADL Screening:  :     No flowsheet data found.              I have received verbal consent from [address] [place] [address] to discuss any/all medical information while they are present in the room.    Medication reconciliation up to date and corrected with patient at this time.

## 2018-02-15 ENCOUNTER — OFFICE VISIT (OUTPATIENT)
Dept: INTERNAL MEDICINE CLINIC | Age: 83
End: 2018-02-15

## 2018-02-15 VITALS
BODY MASS INDEX: 28.34 KG/M2 | HEART RATE: 56 BPM | RESPIRATION RATE: 14 BRPM | SYSTOLIC BLOOD PRESSURE: 150 MMHG | WEIGHT: 126 LBS | OXYGEN SATURATION: 97 % | DIASTOLIC BLOOD PRESSURE: 80 MMHG | TEMPERATURE: 97.8 F | HEIGHT: 56 IN

## 2018-02-15 DIAGNOSIS — I10 ESSENTIAL HYPERTENSION: Primary | ICD-10-CM

## 2018-02-15 RX ORDER — FUROSEMIDE 20 MG/1
20 TABLET ORAL DAILY
Qty: 90 TAB | Refills: 4 | Status: SHIPPED | OUTPATIENT
Start: 2018-02-15 | End: 2019-05-13 | Stop reason: SDUPTHER

## 2018-02-15 NOTE — PROGRESS NOTES
HPI  Ms. Priscila Hogan is a 80y.o. year old female, she is seen today for follow up HTN. BP at home has been better controlled 144-184/47-61. HR 57-70. Is less sob, less edema. No chest pain or dizziness. Is now taking lasix daily and new dose bystolic 33IC daily. Last visit BP log showed 133-228/ with HR 59-70. Chief Complaint   Patient presents with    Blood Pressure Check     Room 1// NON fasting // hearing (this month) and vision (June) screening upcoming    [facility] Dept vaccine record for shingles, Pneumo vaccines at Dr Edi Zhang        Prior to Admission medications    Medication Sig Start Date End Date Taking? Authorizing Provider   omeprazole (PRILOSEC) 20 mg capsule Take 1 Cap by mouth daily. 1/16/18  Yes Beckie Boxer, MD   nebivolol (BYSTOLIC) 10 mg tablet Take 1 Tab by mouth daily. 1/16/18  Yes Beckie Boxer, MD   fexofenadine (ALLEGRA) 180 mg tablet Take 180 mg by mouth daily. 11/1/17  Yes Historical Provider   aspirin delayed-release 325 mg tablet  11/21/17  Yes Historical Provider   cholecalciferol, vitamin D3, (VITAMIN D3) 2,000 unit tab Take  by mouth daily. Yes Historical Provider   atorvastatin (LIPITOR) 20 mg tablet Take 1 Tab by mouth daily. 12/7/17  Yes Beckie Boxer, MD   valsartan (DIOVAN) 160 mg tablet Take 1 Tab by mouth daily. 12/7/17  Yes Beckie Boxer, MD   celecoxib (CELEBREX) 200 mg capsule Take 200 mg by mouth daily. Yes Historical Provider   calcium citrate 200 mg (950 mg) tablet Take 600 mg by mouth daily. Yes Historical Provider   furosemide (LASIX) 20 mg tablet Take 20 mg by mouth as needed.  Indications: EDEMA   Yes Historical Provider         No Known Allergies      REVIEW OF SYSTEMS:  Per HPI    PHYSICAL EXAM:  Visit Vitals    /80    Pulse (!) 56    Temp 97.8 °F (36.6 °C) (Oral)    Resp 14    Ht 4' 8\" (1.422 m)    Wt 126 lb (57.2 kg)    SpO2 97%    BMI 28.25 kg/m2 Constitutional: Appears well-developed and well-nourished. No distress. HENT:   Head: Normocephalic and atraumatic. Eyes: No scleral icterus. Cardiovascular: Normal S1/S2, regular rhythm. No murmurs, rubs, or gallops. Pulmonary/Chest: Effort normal and breath sounds normal. No respiratory distress. No wheezes, rhonchi, or rales. Ext: No edema. Neurological: Alert. Psychiatric: Normal mood and affect. Behavior is normal.     Lab Results   Component Value Date/Time    Sodium 142 12/13/2017 01:33 PM    Potassium 4.7 12/13/2017 01:33 PM    Chloride 102 12/13/2017 01:33 PM    CO2 27 12/13/2017 01:33 PM    Anion gap 9 03/26/2014 05:00 AM    Glucose 90 12/13/2017 01:33 PM    BUN 24 12/13/2017 01:33 PM    Creatinine 1.00 12/13/2017 01:33 PM    BUN/Creatinine ratio 24 12/13/2017 01:33 PM    GFR est AA 59 (L) 12/13/2017 01:33 PM    GFR est non-AA 51 (L) 12/13/2017 01:33 PM    Calcium 9.1 12/13/2017 01:33 PM    Bilirubin, total 0.7 12/13/2017 01:33 PM    AST (SGOT) 20 12/13/2017 01:33 PM    Alk. phosphatase 88 12/13/2017 01:33 PM    Protein, total 6.6 12/13/2017 01:33 PM    Albumin 4.0 12/13/2017 01:33 PM    Globulin 3.3 03/12/2013 03:45 AM    A-G Ratio 1.5 12/13/2017 01:33 PM    ALT (SGPT) 11 12/13/2017 01:33 PM     Lab Results   Component Value Date/Time    Hemoglobin A1c 5.1 03/07/2014 01:35 PM    Hemoglobin A1c 5.4 02/25/2013 02:09 PM      Lab Results   Component Value Date/Time    Cholesterol, total 194 12/13/2017 01:33 PM    HDL Cholesterol 70 12/13/2017 01:33 PM    LDL, calculated 104 (H) 12/13/2017 01:33 PM    VLDL, calculated 20 12/13/2017 01:33 PM    Triglyceride 101 12/13/2017 01:33 PM          ASSESSMENT/PLAN  Diagnoses and all orders for this visit:    1. Essential hypertension  -     furosemide (LASIX) 20 mg tablet; Take 1 Tab by mouth daily.  Indications: Edema    Much better control - continue current medications       Health Maintenance Due   Topic Date Due    ZOSTER VACCINE AGE 60> 04/19/1990    Pneumococcal 65+ Low/Medium Risk (1 of 2 - PCV13) 06/19/1995    MEDICARE YEARLY EXAM  06/19/1995        Follow-up Disposition:  Return in about 4 months (around 6/15/2018) for bp, AWV. Reviewed plan of care. Patient has provided input and agrees with goals. The nurse provided the patient and/or family with advanced directive information if needed and encouraged the patient to provide a copy to the office when available.

## 2018-02-15 NOTE — MR AVS SNAPSHOT
[address] [place] [address] [address] [place] [address] [phone] Patient: Cristian Badillo MRN: [id number] NYX:2/54/4469 Visit Information Date & Time Provider Department Dept. Phone Encounter #  
 2/15/2018 12:45 PM Anali Bro MD Julian Ramirez [phone] [id number] Follow-up Instructions Return in about 4 months (around 6/15/2018) for bp, AWV. Upcoming Health Maintenance Date Due ZOSTER VACCINE AGE 60> 4/19/1990 Pneumococcal 65+ Low/Medium Risk (1 of 2 - PCV13) 6/19/1995 MEDICARE YEARLY EXAM 6/19/1995 GLAUCOMA SCREENING Q2Y 3/31/2018* DTaP/Tdap/Td series (2 - Td) 2/15/2028 *Topic was postponed. The date shown is not the original due date. Allergies as of 2/15/2018  Review Complete On: 2/15/2018 By: Anali Bro MD  
 No Known Allergies Current Immunizations  Reviewed on 3/11/2013 No immunizations on file. Not reviewed this visit You Were Diagnosed With   
  
 Codes Comments Essential hypertension    -  Primary ICD-10-CM: I10 
ICD-9-CM: 401.9 Vitals BP Pulse Temp Resp Height(growth percentile) Weight(growth percentile) 150/80 (!) 56 97.8 °F (36.6 °C) (Oral) 14 4' 8\" (1.422 m) 126 lb (57.2 kg) SpO2 BMI OB Status Smoking Status 97% 28.25 kg/m2 Postmenopausal Never Smoker Vitals History BMI and BSA Data Body Mass Index Body Surface Area  
 28.25 kg/m 2 1.5 m 2 Preferred Pharmacy Pharmacy Name Phone [address] [facility] [phone] Your Updated Medication List  
  
   
This list is accurate as of: 2/15/18  1:10 PM.  Always use your most recent med list.  
  
  
  
  
 aspirin delayed-release 325 mg tablet  
  
 atorvastatin 20 mg tablet Commonly known as:  LIPITOR Take 1 Tab by mouth daily. calcium citrate 200 mg (950 mg) tablet Take 600 mg by mouth daily. CeleBREX 200 mg capsule Generic drug:  celecoxib Take 200 mg by mouth daily. fexofenadine 180 mg tablet Commonly known as:  Blake Fontan Take 180 mg by mouth daily. furosemide 20 mg tablet Commonly known as:  LASIX Take 1 Tab by mouth daily. Indications: Edema  
  
 nebivolol 10 mg tablet Commonly known as:  BYSTOLIC Take 1 Tab by mouth daily. omeprazole 20 mg capsule Commonly known as:  PRILOSEC Take 1 Cap by mouth daily. valsartan 160 mg tablet Commonly known as:  DIOVAN Take 1 Tab by mouth daily. VITAMIN D3 2,000 unit Tab Generic drug:  cholecalciferol (vitamin D3) Take  by mouth daily. Prescriptions Sent to Pharmacy Refills  
 furosemide (LASIX) 20 mg tablet 4 Sig: Take 1 Tab by mouth daily. Indications: Edema Class: Normal  
 Pharmacy: [address], [address] [place] [address] #: [phone] Route: Oral  
  
Follow-up Instructions Return in about 4 months (around 6/15/2018) for bp, AWV. Patient Instructions Check that [facility] has diovan 337IR daily and bystolic 73PZ daily as current dose. Introducing [facility]! Dear Dominic Mirza: 
Thank you for requesting a InfoReach account. Our records indicate that you already have an active InfoReach account. You can access your account anytime at [email or web address] Did you know that you can access your hospital and ER discharge instructions at any time in InfoReach? You can also review all of your test results from your hospital stay or ER visit. Additional Information If you have questions, please visit the Frequently Asked Questions section of the InfoReach website at [email or web address] Remember, InfoReach is NOT to be used for urgent needs. For medical emergencies, dial 911. Now available from your iPhone and Android! Please provide this summary of care documentation to your next provider. Your primary care clinician is listed as Mery Aden. If you have any questions after today's visit, please call [phone].

## 2018-02-15 NOTE — PROGRESS NOTES
Juliane Bui  Identified pt with two pt identifiers(name and ). Chief Complaint   Patient presents with    Blood Pressure Check     Room . Have you been to the ER, urgent care clinic since your last visit? Hospitalized since your last visit? NO    2. Have you seen or consulted any other health care providers outside of the [address] [place] [address] since your last visit? Include any pap smears or colon screening. NO      Dr Pisano Post notified of reason for visit, vitals and flowsheets obtained on patients. Reviewed record In preparation for visit, huddled with provider and have obtained necessary documentation      Health Maintenance Due   Topic    DTaP/Tdap/Td series (1 - Tdap)    ZOSTER VACCINE AGE 60>     GLAUCOMA SCREENING Q2Y     Pneumococcal 65+ Low/Medium Risk (1 of 2 - PCV13)    MEDICARE YEARLY EXAM        Wt Readings from Last 3 Encounters:   02/15/18 126 lb (57.2 kg)   18 125 lb 3.2 oz (56.8 kg)   17 125 lb (56.7 kg)     Temp Readings from Last 3 Encounters:   18 97.6 °F (36.4 °C) (Oral)   17 97.8 °F (36.6 °C) (Oral)   14 98 °F (36.7 °C)     BP Readings from Last 3 Encounters:   18 188/80   17 190/78   11/10/15 175/72     Pulse Readings from Last 3 Encounters:   18 67   17 67   11/10/15 93     Vitals:    02/15/18 1241   Resp: 14   Weight: 126 lb (57.2 kg)   Height: 4' 8\" (1.422 m)   PainSc:   0 - No pain         Learning Assessment:  :     No flowsheet data found. Depression Screening:  :     No flowsheet data found. Fall Risk Assessment:  :     No flowsheet data found. Abuse Screening:  :     No flowsheet data found. ADL Screening:  :     No flowsheet data found. I have received verbal consent from [address] [place] [address] to discuss any/all medical information while they are present in the room. Medication reconciliation up to date and corrected with patient at this time.

## 2018-07-12 ENCOUNTER — OFFICE VISIT (OUTPATIENT)
Dept: INTERNAL MEDICINE CLINIC | Facility: CLINIC | Age: 83
End: 2018-07-12

## 2018-07-12 VITALS
TEMPERATURE: 98 F | HEART RATE: 80 BPM | SYSTOLIC BLOOD PRESSURE: 180 MMHG | HEIGHT: 56 IN | OXYGEN SATURATION: 96 % | RESPIRATION RATE: 16 BRPM | WEIGHT: 122.2 LBS | DIASTOLIC BLOOD PRESSURE: 96 MMHG | BODY MASS INDEX: 27.49 KG/M2

## 2018-07-12 DIAGNOSIS — Z00.00 MEDICARE ANNUAL WELLNESS VISIT, SUBSEQUENT: Primary | ICD-10-CM

## 2018-07-12 DIAGNOSIS — Z13.39 SCREENING FOR ALCOHOLISM: ICD-10-CM

## 2018-07-12 DIAGNOSIS — Z13.31 SCREENING FOR DEPRESSION: ICD-10-CM

## 2018-07-12 DIAGNOSIS — Z71.89 ADVANCED DIRECTIVES, COUNSELING/DISCUSSION: ICD-10-CM

## 2018-07-12 DIAGNOSIS — I10 ESSENTIAL HYPERTENSION: ICD-10-CM

## 2018-07-12 DIAGNOSIS — G89.29 CHRONIC BILATERAL THORACIC BACK PAIN: ICD-10-CM

## 2018-07-12 DIAGNOSIS — M48.061 SPINAL STENOSIS OF LUMBAR REGION WITHOUT NEUROGENIC CLAUDICATION: ICD-10-CM

## 2018-07-12 DIAGNOSIS — M54.6 CHRONIC BILATERAL THORACIC BACK PAIN: ICD-10-CM

## 2018-07-12 RX ORDER — VALSARTAN 160 MG/1
160 TABLET ORAL 2 TIMES DAILY
Qty: 180 TAB | Refills: 3 | Status: SHIPPED | OUTPATIENT
Start: 2018-07-12 | End: 2018-07-30 | Stop reason: RX

## 2018-07-12 NOTE — PROGRESS NOTES
HPI  Ms. Talib Fernandez is a 80y.o. year old female, she is seen today for follow up HTN, AWV. Here for follow up HTN. Brings log with BP ranges mostly 400O-792Z systolic. Had some stressful situations - birthdays, anniversaries, worries about her . Also complaining of worsening back pain - has known spinal stenosis, chronic pain with surgery, back to ribs b/l. Some sob walking up and down steps. Sometimes HA, has new lenses in her glasses helping some. Not much edema, L>R. No dizziness or lightheadedness. Still does laundry for the family, including ironing which she enjoys. No chest pain. Chief Complaint   Patient presents with    Blood Pressure Check     Room 2A// NON fasting     Annual Wellness Visit     just had eye exam w Vera         Prior to Admission medications    Medication Sig Start Date End Date Taking? Authorizing Provider   aspirin delayed-release 325 mg tablet Take 1 Tab by mouth daily. 5/29/18  Yes Ajith Alvarez MD   furosemide (LASIX) 20 mg tablet Take 1 Tab by mouth daily. Indications: Edema 2/15/18  Yes Ajith Alvarez MD   omeprazole (PRILOSEC) 20 mg capsule Take 1 Cap by mouth daily. 1/16/18  Yes Ajith Alvarez MD   nebivolol (BYSTOLIC) 10 mg tablet Take 1 Tab by mouth daily. 1/16/18  Yes Ajith Alvarez MD   cholecalciferol, vitamin D3, (VITAMIN D3) 2,000 unit tab Take  by mouth daily. Yes Historical Provider   atorvastatin (LIPITOR) 20 mg tablet Take 1 Tab by mouth daily. 12/7/17  Yes Ajith Alvarez MD   valsartan (DIOVAN) 160 mg tablet Take 1 Tab by mouth daily. 12/7/17  Yes Ajith Alvarez MD   celecoxib (CELEBREX) 200 mg capsule Take 200 mg by mouth daily. Yes Historical Provider   calcium citrate 200 mg (950 mg) tablet Take 600 mg by mouth daily.    Yes Historical Provider         No Known Allergies      REVIEW OF SYSTEMS:  Per HPI    PHYSICAL EXAM:  Visit Vitals    BP (!) 180/96    Pulse 80    Temp 98 °F (36.7 °C) (Oral)    Resp 16    Ht 4' 8\" (1.422 m)    Wt 122 lb 3.2 oz (55.4 kg)    SpO2 96%    BMI 27.4 kg/m2     Constitutional: Appears well-developed and well-nourished. No distress. HENT:   Head: Normocephalic and atraumatic. Eyes: No scleral icterus. Neck: no lad, no tm, supple   Cardiovascular: Normal S1/S2, regular rhythm. No murmurs, rubs, or gallops. Pulmonary/Chest: Effort normal and breath sounds normal. No respiratory distress. No wheezes, rhonchi, or rales. +mild tenderness lower chest wall muscles anteriorly  Abdomen: Soft, NT/ND, +BS, no rebound or guarding, no masses, no HSM appreciated. Back: +kyphosis, +well healed scar and pain over thoracic spine in area of scar tissue  Ext: No edema. Neurological: Alert. Psychiatric: Normal mood and affect. Behavior is normal.     Lab Results   Component Value Date/Time    Sodium 142 12/13/2017 01:33 PM    Potassium 4.7 12/13/2017 01:33 PM    Chloride 102 12/13/2017 01:33 PM    CO2 27 12/13/2017 01:33 PM    Anion gap 9 03/26/2014 05:00 AM    Glucose 90 12/13/2017 01:33 PM    BUN 24 12/13/2017 01:33 PM    Creatinine 1.00 12/13/2017 01:33 PM    BUN/Creatinine ratio 24 12/13/2017 01:33 PM    GFR est AA 59 (L) 12/13/2017 01:33 PM    GFR est non-AA 51 (L) 12/13/2017 01:33 PM    Calcium 9.1 12/13/2017 01:33 PM    Bilirubin, total 0.7 12/13/2017 01:33 PM    AST (SGOT) 20 12/13/2017 01:33 PM    Alk.  phosphatase 88 12/13/2017 01:33 PM    Protein, total 6.6 12/13/2017 01:33 PM    Albumin 4.0 12/13/2017 01:33 PM    Globulin 3.3 03/12/2013 03:45 AM    A-G Ratio 1.5 12/13/2017 01:33 PM    ALT (SGPT) 11 12/13/2017 01:33 PM     Lab Results   Component Value Date/Time    Hemoglobin A1c 5.1 03/07/2014 01:35 PM    Hemoglobin A1c 5.4 02/25/2013 02:09 PM      Lab Results   Component Value Date/Time    Cholesterol, total 194 12/13/2017 01:33 PM    HDL Cholesterol 70 12/13/2017 01:33 PM    LDL, calculated 104 (H) 12/13/2017 01:33 PM    VLDL, calculated 20 12/13/2017 01:33 PM    Triglyceride 101 12/13/2017 01:33 PM          ASSESSMENT/PLAN  Diagnoses and all orders for this visit:    1. Medicare annual wellness visit, subsequent    2. Advanced directives, counseling/discussion    3. Screening for alcoholism  -     Annual  Alcohol Screen 15 min ()    4. Screening for depression  -     Depression Screen Annual    5. Essential hypertension  -     METABOLIC PANEL, BASIC  -     valsartan (DIOVAN) 160 mg tablet; Take 1 Tab by mouth two (2) times a day. -     CBC WITH AUTOMATED DIFF  Not controlled - increase valsartan as above, if not covered by insurance would change to 320mg daily    6. Spinal stenosis of lumbar region without neurogenic claudication  Add lidocaine 4% OTC patch to most painful area, avoid activities which exacerbate pain and may take acetaminophen prn    7. Chronic bilateral thoracic back pain        Health Maintenance Due   Topic Date Due    ZOSTER VACCINE AGE 60>  04/19/1990    Pneumococcal 65+ Low/Medium Risk (1 of 2 - PCV13) 06/19/1995        Follow-up Disposition:  Return in about 1 month (around 8/12/2018) for bp. Reviewed plan of care. Patient has provided input and agrees with goals. The nurse provided the patient and/or family with advanced directive information if needed and encouraged the patient to provide a copy to the office when available. This is the Subsequent Medicare Annual Wellness Exam, performed 12 months or more after the Initial AWV or the last Subsequent AWV    I have reviewed the patient's medical history in detail and updated the computerized patient record. History     Past Medical History:   Diagnosis Date    Arthritis     Beta-blocker therapy     Chronic pain     BACK    GERD (gastroesophageal reflux disease)     History of blood transfusion 3/2013    ST.  [address], NO REACTION    Hypertension     Osteoporosis     Thromboembolus ([address]     RT. GROIN    Ulcerative colitis ([address]     no symptoms since 1980s      Past Surgical History:   Procedure Laterality Date    DILATE ESOPHAGUS  11/10/2015         HX CATARACT REMOVAL  1990'S    BILATERAL    HX GI      COLONOSCOPY    HX LUMBAR FUSION  3/2013    T10-S1 - Dr. Brayan Irene and Dr. Kali Fan    HX TONSILLECTOMY      HX TUBAL LIGATION      UPPER GI ENDOSCOPY,BIOPSY  11/10/2015          Current Outpatient Prescriptions   Medication Sig Dispense Refill    aspirin delayed-release 325 mg tablet Take 1 Tab by mouth daily. 90 Tab 3    furosemide (LASIX) 20 mg tablet Take 1 Tab by mouth daily. Indications: Edema 90 Tab 4    omeprazole (PRILOSEC) 20 mg capsule Take 1 Cap by mouth daily. 90 Cap 3    nebivolol (BYSTOLIC) 10 mg tablet Take 1 Tab by mouth daily. 90 Tab 1    cholecalciferol, vitamin D3, (VITAMIN D3) 2,000 unit tab Take  by mouth daily.  atorvastatin (LIPITOR) 20 mg tablet Take 1 Tab by mouth daily. 90 Tab 3    valsartan (DIOVAN) 160 mg tablet Take 1 Tab by mouth daily. 90 Tab 3    celecoxib (CELEBREX) 200 mg capsule Take 200 mg by mouth daily.  calcium citrate 200 mg (950 mg) tablet Take 600 mg by mouth daily.        No Known Allergies  Family History   Problem Relation Age of Onset    Heart Disease Mother     Hypertension Mother     Cancer Father      UNKNOWN    Liver Disease Sister     Cancer Brother      UNKNOWN    Diabetes Daughter     Hypertension Daughter     Diabetes Son     Hypertension Son     Diabetes Son     Liver Disease Son     Hypertension Son     Diabetes Son     Other Sister      SCLERODERMA    Anesth Problems Neg Hx      Social History   Substance Use Topics    Smoking status: Never Smoker    Smokeless tobacco: Never Used    Alcohol use No     Patient Active Problem List   Diagnosis Code    Spinal stenosis M48.00    TIA (transient ischemic attack) G45.9    Age-related osteoporosis without current pathological fracture M81.0    Gastroesophageal reflux disease without esophagitis K21.9    Essential hypertension I10    Lumbar spinal stenosis M48.061       Depression Risk Factor Screening:     PHQ over the last two weeks 7/12/2018   Little interest or pleasure in doing things Not at all   Feeling down, depressed or hopeless Not at all   Total Score PHQ 2 0     Alcohol Risk Factor Screening: You do not drink alcohol or very rarely. Functional Ability and Level of Safety:   Hearing Loss  The patient needs further evaluation. Wears hearing aids, recent evaluation. Activities of Daily Living  The home contains: no safety equipment. Patient does total self care    Fall Risk  Fall Risk Assessment, last 12 mths 2/15/2018   Able to walk? Yes   Fall in past 12 months? No       Abuse Screen  Patient is not abused    Cognitive Screening   Evaluation of Cognitive Function:  Has your family/caregiver stated any concerns about your memory: no  Normal    Patient Care Team   Patient Care Team:  Yenifer Harvey MD as PCP - General (Internal Medicine)    Assessment/Plan   Education and counseling provided:  Are appropriate based on today's review and evaluation    Diagnoses and all orders for this visit:    1. Medicare annual wellness visit, subsequent    2. Advanced directives, counseling/discussion    3. Screening for alcoholism  -     Annual  Alcohol Screen 15 min ()    4. Screening for depression  -     Depression Screen Annual    5. Essential hypertension  -     METABOLIC PANEL, BASIC  -     valsartan (DIOVAN) 160 mg tablet; Take 1 Tab by mouth two (2) times a day. -     CBC WITH AUTOMATED DIFF    6. Spinal stenosis of lumbar region without neurogenic claudication    7.  Chronic bilateral thoracic back pain        Health Maintenance Due   Topic Date Due    ZOSTER VACCINE AGE 60>  04/19/1990    Pneumococcal 65+ Low/Medium Risk (1 of 2 - PCV13) 06/19/1995

## 2018-07-12 NOTE — PATIENT INSTRUCTIONS
Try lidocaine 4% patch over the counter for back pain        Medicare Wellness Visit, Female    The best way to live healthy is to have a lifestyle where you eat a well-balanced diet, exercise regularly, limit alcohol use, and quit all forms of tobacco/nicotine, if applicable. Regular preventive services are another way to keep healthy. Preventive services (vaccines, screening tests, monitoring & exams) can help personalize your care plan, which helps you manage your own care. Screening tests can find health problems at the earliest stages, when they are easiest to treat. [facility] follows the current, evidence-based guidelines published by the [facility] [employer] (USPSTF) when recommending preventive services for our patients. Because we follow these guidelines, sometimes recommendations change over time as research supports it. (For example, mammograms used to be recommended annually. Even though Medicare will still pay for an annual mammogram, the newer guidelines recommend a mammogram every two years for women of average risk.)    Of course, you and your provider may decide to screen more often for some diseases, based on your risk and co-morbidities (chronic disease you are already diagnosed with). Preventive services for you include:    - Medicare offers their members a free annual wellness visit, which is time for you and your primary care provider to discuss and plan for your preventive service needs. Take advantage of this benefit every year!    -All people over age 72 should receive the recommended pneumonia vaccines. Current USPSTF guidelines recommend a series of two vaccines for the best pneumonia protection.     -All adults should have a yearly flu vaccine and a tetanus vaccine every 10 years.  All adults age 61 years should receive a shingles vaccine once in their lifetime.      -A bone mass density test is recommended when a woman turns 65 to screen for osteoporosis. This test is only recommended once as a screening. Some providers will use this same test as a disease monitoring tool if you already have osteoporosis. -All adults age 38-68 years who are overweight should have a diabetes screening test once every three years.     -Other screening tests & preventive services for persons with diabetes include: an eye exam to screen for diabetic retinopathy, a kidney function test, a foot exam, and stricter control over your cholesterol.     -Cardiovascular screening for adults with routine risk involves an electrocardiogram (ECG) at intervals determined by the provider.     -Colorectal cancer screenings should be done for adults age 54-65 years with normal risk. There are a number of acceptable methods of screening for this type of cancer. Each test has its own benefits and drawbacks. Discuss with your provider what is most appropriate for you during your annual wellness visit. The different tests include: colonoscopy (considered the best screening method), a fecal occult blood test, a fecal DNA test, and sigmoidoscopy. -Breast cancer screenings are recommended every other year for women of normal risk age 54-69 years.     -Cervical cancer screenings for women over age 72 are only recommended with certain risk factors.     -All adults born between [facility] should be screened once for Hepatitis C.      Here is a list of your current Health Maintenance items (your personalized list of preventive services) with a due date:  Health Maintenance Due   Topic Date Due    Shingles Vaccine  04/19/1990    Pneumococcal Vaccine (1 of 2 - PCV13) 06/19/1995    Annual Well Visit  03/14/2018

## 2018-07-12 NOTE — MR AVS SNAPSHOT
[address] [address] [phone] Patient: Doris Melo MRN: [id number] YDL:[birth year] Visit Information Date & Time Provider Department Dept. Phone Encounter #  
 7/12/2018  1:00 PM Cele Harmon MD [facility] Internal Medicine of [address] [place] [address] [id number] Follow-up Instructions Return in about 1 month (around 8/12/2018) for bp. Upcoming Health Maintenance Date Due ZOSTER VACCINE AGE 60> 4/19/1990 Pneumococcal 65+ Low/Medium Risk (1 of 2 - PCV13) 6/19/1995 MEDICARE YEARLY EXAM 3/14/2018 Influenza Age 5 to Adult 8/1/2018 GLAUCOMA SCREENING Q2Y 7/2/2020 DTaP/Tdap/Td series (2 - Td) 2/15/2028 Allergies as of 7/12/2018  Review Complete On: 7/12/2018 By: Cele Harmon MD  
 No Known Allergies Current Immunizations  Reviewed on 7/12/2018 No immunizations on file. Reviewed by Cele Harmon MD on 7/12/2018 at  1:19 PM  
You Were Diagnosed With   
  
 Codes Comments Medicare annual wellness visit, subsequent    -  Primary ICD-10-CM: Z00.00 ICD-9-CM: V70.0 Advanced directives, counseling/discussion     ICD-10-CM: Z71.89 ICD-9-CM: V65.49 Screening for alcoholism     ICD-10-CM: Z13.89 ICD-9-CM: V79.1 Screening for depression     ICD-10-CM: Z13.89 ICD-9-CM: V79.0 Essential hypertension     ICD-10-CM: I10 
ICD-9-CM: 401.9 Spinal stenosis of lumbar region without neurogenic claudication     ICD-10-CM: M48.061 
ICD-9-CM: 724.02 Chronic bilateral thoracic back pain     ICD-10-CM: M54.6, G89.29 ICD-9-CM: 724.1, 338.29 Vitals BP Pulse Temp Resp Height(growth percentile) Weight(growth percentile) (!) 180/96 80 98 °F (36.7 °C) (Oral) 16 4' 8\" (1.422 m) 122 lb 3.2 oz (55.4 kg) SpO2 BMI OB Status Smoking Status 96% 27.4 kg/m2 Postmenopausal Never Smoker Vitals History BMI and BSA Data Body Mass Index Body Surface Area  
 27.4 kg/m 2 1.48 m 2 Preferred Pharmacy Pharmacy Name Phone [facility]/PHARM[facility]Y #[id number] Chaya Seth [phone] Your Updated Medication List  
  
   
This list is accurate as of 7/12/18  1:38 PM.  Always use your most recent med list.  
  
  
  
  
 aspirin delayed-release 325 mg tablet Take 1 Tab by mouth daily. atorvastatin 20 mg tablet Commonly known as:  LIPITOR Take 1 Tab by mouth daily. calcium citrate 200 mg (950 mg) tablet Take 600 mg by mouth daily. CeleBREX 200 mg capsule Generic drug:  celecoxib Take 200 mg by mouth daily. furosemide 20 mg tablet Commonly known as:  LASIX Take 1 Tab by mouth daily. Indications: Edema  
  
 nebivolol 10 mg tablet Commonly known as:  BYSTOLIC Take 1 Tab by mouth daily. omeprazole 20 mg capsule Commonly known as:  PRILOSEC Take 1 Cap by mouth daily. valsartan 160 mg tablet Commonly known as:  DIOVAN Take 1 Tab by mouth two (2) times a day. VITAMIN D3 2,000 unit Tab Generic drug:  cholecalciferol (vitamin D3) Take  by mouth daily. Prescriptions Sent to Pharmacy Refills  
 valsartan (DIOVAN) 160 mg tablet 3 Sig: Take 1 Tab by mouth two (2) times a day. Class: Normal  
 Pharmacy: [facility]/pharmacy #[id number]  [place] , [address] [place] [address] #: [phone] Route: Oral  
  
We Performed the Following CBC WITH AUTOMATED DIFF [73187 CPT(R)] [address] [[id number] [facility]] METABOLIC PANEL, BASIC [20213 CPT(R)] [place] ANNUAL ALCOHOL SCREEN 15 MIN [id number] [facility]] Follow-up Instructions Return in about 1 month (around 8/12/2018) for bp. Patient Instructions Try lidocaine 4% patch over the counter for back pain Medicare Wellness Visit, Female The best way to live healthy is to have a lifestyle where you eat a well-balanced diet, exercise regularly, limit alcohol use, and quit all forms of tobacco/nicotine, if applicable. Regular preventive services are another way to keep healthy. Preventive services (vaccines, screening tests, monitoring & exams) can help personalize your care plan, which helps you manage your own care. Screening tests can find health problems at the earliest stages, when they are easiest to treat. [facility] follows the current, evidence-based guidelines published by the [facility] [employer] ([facility]) when recommending preventive services for our patients. Because we follow these guidelines, sometimes recommendations change over time as research supports it. (For example, mammograms used to be recommended annually. Even though Medicare will still pay for an annual mammogram, the newer guidelines recommend a mammogram every two years for women of average risk.) Of course, you and your provider may decide to screen more often for some diseases, based on your risk and co-morbidities (chronic disease you are already diagnosed with). Preventive services for you include: - Medicare offers their members a free annual wellness visit, which is time for you and your primary care provider to discuss and plan for your preventive service needs. Take advantage of this benefit every year! 
 
-All people over age 72 should receive the recommended pneumonia vaccines. Current USPSTF guidelines recommend a series of two vaccines for the best pneumonia protection.  
 
-All adults should have a yearly flu vaccine and a tetanus vaccine every 10 years. All adults age 61 years should receive a shingles vaccine once in their lifetime.   
 
-A bone mass density test is recommended when a woman turns 65 to screen for osteoporosis. This test is only recommended once as a screening.  Some providers will use this same test as a disease monitoring tool if you already have osteoporosis. -All adults age 38-68 years who are overweight should have a diabetes screening test once every three years.  
 
-Other screening tests & preventive services for persons with diabetes include: an eye exam to screen for diabetic retinopathy, a kidney function test, a foot exam, and stricter control over your cholesterol.  
 
-Cardiovascular screening for adults with routine risk involves an electrocardiogram (ECG) at intervals determined by the provider.  
 
-Colorectal cancer screenings should be done for adults age 54-65 years with normal risk. There are a number of acceptable methods of screening for this type of cancer. Each test has its own benefits and drawbacks. Discuss with your provider what is most appropriate for you during your annual wellness visit. The different tests include: colonoscopy (considered the best screening method), a fecal occult blood test, a fecal DNA test, and sigmoidoscopy. -Breast cancer screenings are recommended every other year for women of normal risk age 54-69 years.  
 
-Cervical cancer screenings for women over age 72 are only recommended with certain risk factors.  
 
-All adults born between [facility] should be screened once for Hepatitis C. Here is a list of your current Health Maintenance items (your personalized list of preventive services) with a due date: 
Health Maintenance Due Topic Date Due  Shingles Vaccine  04/19/1990  Pneumococcal Vaccine (1 of 2 - PCV13) 06/19/1995 [facility] Annual Well Visit  03/14/2018 Introducing [facility]! Dear Destini Garcia: 
Thank you for requesting a [employer] account. Our records indicate that you already have an active [employer] account. You can access your account anytime at [email or web address] Did you know that you can access your hospital and ER discharge instructions at any time in [employer]?   You can also review all of your test results from your hospital stay or ER visit. Additional Information If you have questions, please visit the Frequently Asked Questions section of the [employer] website at [email or web address] Remember, [employer] is NOT to be used for urgent needs. For medical emergencies, dial 911. Now available from your iPhone and Android! Please provide this summary of care documentation to your next provider. Your primary care clinician is listed as Mery Aden. If you have any questions after today's visit, please call [phone].

## 2018-07-13 LAB
BASOPHILS # BLD AUTO: 0 X10E3/UL (ref 0–0.2)
BASOPHILS NFR BLD AUTO: 1 %
BUN SERPL-MCNC: 21 MG/DL (ref 8–27)
BUN/CREAT SERPL: 24 (ref 12–28)
CALCIUM SERPL-MCNC: 9.4 MG/DL (ref 8.7–10.3)
CHLORIDE SERPL-SCNC: 100 MMOL/L (ref 96–106)
CO2 SERPL-SCNC: 24 MMOL/L (ref 20–29)
CREAT SERPL-MCNC: 0.87 MG/DL (ref 0.57–1)
EOSINOPHIL # BLD AUTO: 0.2 X10E3/UL (ref 0–0.4)
EOSINOPHIL NFR BLD AUTO: 3 %
ERYTHROCYTE [DISTWIDTH] IN BLOOD BY AUTOMATED COUNT: 13.4 % (ref 12.3–15.4)
GLUCOSE SERPL-MCNC: 81 MG/DL (ref 65–99)
HCT VFR BLD AUTO: 36.8 % (ref 34–46.6)
HGB BLD-MCNC: 12.5 G/DL (ref 11.1–15.9)
IMM GRANULOCYTES # BLD: 0 X10E3/UL (ref 0–0.1)
IMM GRANULOCYTES NFR BLD: 0 %
LYMPHOCYTES # BLD AUTO: 1.7 X10E3/UL (ref 0.7–3.1)
LYMPHOCYTES NFR BLD AUTO: 25 %
MCH RBC QN AUTO: 30.6 PG (ref 26.6–33)
MCHC RBC AUTO-ENTMCNC: 34 G/DL (ref 31.5–35.7)
MCV RBC AUTO: 90 FL (ref 79–97)
MONOCYTES # BLD AUTO: 0.5 X10E3/UL (ref 0.1–0.9)
MONOCYTES NFR BLD AUTO: 7 %
NEUTROPHILS # BLD AUTO: 4.5 X10E3/UL (ref 1.4–7)
NEUTROPHILS NFR BLD AUTO: 64 %
PLATELET # BLD AUTO: 238 X10E3/UL (ref 150–379)
POTASSIUM SERPL-SCNC: 4.9 MMOL/L (ref 3.5–5.2)
RBC # BLD AUTO: 4.08 X10E6/UL (ref 3.77–5.28)
SODIUM SERPL-SCNC: 140 MMOL/L (ref 134–144)
WBC # BLD AUTO: 7 X10E3/UL (ref 3.4–10.8)

## 2018-07-15 DIAGNOSIS — I10 ESSENTIAL HYPERTENSION: ICD-10-CM

## 2018-07-15 RX ORDER — NEBIVOLOL HYDROCHLORIDE 10 MG/1
TABLET ORAL
Qty: 90 TAB | Refills: 1 | Status: SHIPPED | OUTPATIENT
Start: 2018-07-15 | End: 2019-01-11 | Stop reason: SDUPTHER

## 2018-07-30 RX ORDER — LOSARTAN POTASSIUM 50 MG/1
50 TABLET ORAL 2 TIMES DAILY
Qty: 180 TAB | Refills: 3 | Status: SHIPPED | OUTPATIENT
Start: 2018-07-30 | End: 2019-07-08 | Stop reason: SDUPTHER

## 2018-08-09 ENCOUNTER — TELEPHONE (OUTPATIENT)
Dept: INTERNAL MEDICINE CLINIC | Facility: CLINIC | Age: 83
End: 2018-08-09

## 2018-08-09 NOTE — TELEPHONE ENCOUNTER
Tell [facility] she was switched to losartan which was ordered by me on 7/30 so this message is irrelevant.

## 2018-08-21 ENCOUNTER — OFFICE VISIT (OUTPATIENT)
Dept: INTERNAL MEDICINE CLINIC | Facility: CLINIC | Age: 83
End: 2018-08-21

## 2018-08-21 VITALS
HEIGHT: 56 IN | TEMPERATURE: 97.7 F | RESPIRATION RATE: 16 BRPM | HEART RATE: 64 BPM | DIASTOLIC BLOOD PRESSURE: 84 MMHG | SYSTOLIC BLOOD PRESSURE: 152 MMHG | OXYGEN SATURATION: 96 % | BODY MASS INDEX: 27.44 KG/M2 | WEIGHT: 122 LBS

## 2018-08-21 DIAGNOSIS — J30.2 SEASONAL ALLERGIC RHINITIS, UNSPECIFIED TRIGGER: ICD-10-CM

## 2018-08-21 DIAGNOSIS — I10 ESSENTIAL HYPERTENSION: Primary | ICD-10-CM

## 2018-08-21 RX ORDER — AMLODIPINE BESYLATE 5 MG/1
5 TABLET ORAL DAILY
Qty: 90 TAB | Refills: 1 | Status: SHIPPED | OUTPATIENT
Start: 2018-08-21 | End: 2018-12-20

## 2018-08-21 RX ORDER — FLUTICASONE PROPIONATE 50 MCG
2 SPRAY, SUSPENSION (ML) NASAL DAILY
Qty: 1 BOTTLE | Refills: 2 | Status: SHIPPED | OUTPATIENT
Start: 2018-08-21 | End: 2019-03-21

## 2018-08-21 RX ORDER — AMLODIPINE BESYLATE 5 MG/1
5 TABLET ORAL DAILY
Qty: 30 TAB | Refills: 0 | Status: SHIPPED | OUTPATIENT
Start: 2018-08-21 | End: 2018-09-21 | Stop reason: SDUPTHER

## 2018-08-21 NOTE — PROGRESS NOTES
HPI  Ms. Christina Botello is a 80y.o. year old female, she is seen today for follow up HTN. Last visit I increased valsartan to 160mg bid and then in interim had to change to losartan due to recall but she is still taking valsartan. Denies chest pain, sometimes dizzy - not often. Some HA intermittent, no chest pain. Will get sob with exertion and from thoracic pain. Some mild edema left leg. Brings bp lo-196/48-68 with HR 54-65. Chief Complaint   Patient presents with    Blood Pressure Check     Room 2B// NON fasting// still traking her remaining Valsartan// did not start Losartan        Prior to Admission medications    Medication Sig Start Date End Date Taking? Authorizing Provider   varicella-zoster recombinant, PF, (SHINGRIX, PF,) 50 mcg/0.5 mL susr injection 0.5 mL by IntraMUSCular route once for 1 dose. 18 Yes Sharifa Addison MD   amLODIPine (NORVASC) 5 mg tablet Take 1 Tab by mouth daily. Indications: hypertension 18  Yes Sharifa Addison MD   amLODIPine (NORVASC) 5 mg tablet Take 1 Tab by mouth daily. 18  Yes Sharifa Addison MD   fluticasone (FLONASE) 50 mcg/actuation nasal spray 2 Sprays by Both Nostrils route daily. 18  Yes Sharifa Addison MD   BYSTOLIC 10 mg tablet TAKE 1 TABLET DAILY 7/15/18  Yes Sharifa Addison MD   aspirin delayed-release 325 mg tablet Take 1 Tab by mouth daily. 18  Yes Sharifa Addison MD   furosemide (LASIX) 20 mg tablet Take 1 Tab by mouth daily. Indications: Edema 2/15/18  Yes Sharifa Addison MD   omeprazole (PRILOSEC) 20 mg capsule Take 1 Cap by mouth daily. 18  Yes Sharifa Addison MD   cholecalciferol, vitamin D3, (VITAMIN D3) 2,000 unit tab Take  by mouth daily. Yes Historical Provider   atorvastatin (LIPITOR) 20 mg tablet Take 1 Tab by mouth daily. 17  Yes Sharifa Addison MD   celecoxib (CELEBREX) 200 mg capsule Take 200 mg by mouth daily.    Yes Historical Provider   calcium citrate 200 mg (950 mg) tablet Take 600 mg by mouth daily. Yes Historical Provider   losartan (COZAAR) 50 mg tablet Take 1 Tab by mouth two (2) times a day. 7/30/18   Maria Fernanda Moncada MD         No Known Allergies      REVIEW OF SYSTEMS:  Per HPI    PHYSICAL EXAM:  Visit Vitals    /84    Pulse 64    Temp 97.7 °F (36.5 °C) (Oral)    Resp 16    Ht 4' 8\" (1.422 m)    Wt 122 lb (55.3 kg)    SpO2 96%    BMI 27.35 kg/m2     Constitutional: Appears well-developed and well-nourished. No distress. HENT:   Head: Normocephalic and atraumatic. Eyes: No scleral icterus. Mouth: OP clear without lesions, no pharyngeal exudate  Nose: nasal mucosa pale  Cardiovascular: Normal S1/S2, regular rhythm. No murmurs, rubs, or gallops. Pulmonary/Chest: Effort normal and breath sounds normal. No respiratory distress. No wheezes, rhonchi, or rales. Ext: No edema rle, trace above sock line on left. Neurological: Alert. Psychiatric: Normal mood and affect. Behavior is normal.     Lab Results   Component Value Date/Time    Sodium 140 07/12/2018 01:50 PM    Potassium 4.9 07/12/2018 01:50 PM    Chloride 100 07/12/2018 01:50 PM    CO2 24 07/12/2018 01:50 PM    Anion gap 9 03/26/2014 05:00 AM    Glucose 81 07/12/2018 01:50 PM    BUN 21 07/12/2018 01:50 PM    Creatinine 0.87 07/12/2018 01:50 PM    BUN/Creatinine ratio 24 07/12/2018 01:50 PM    GFR est AA 69 07/12/2018 01:50 PM    GFR est non-AA 60 07/12/2018 01:50 PM    Calcium 9.4 07/12/2018 01:50 PM    Bilirubin, total 0.7 12/13/2017 01:33 PM    AST (SGOT) 20 12/13/2017 01:33 PM    Alk.  phosphatase 88 12/13/2017 01:33 PM    Protein, total 6.6 12/13/2017 01:33 PM    Albumin 4.0 12/13/2017 01:33 PM    Globulin 3.3 03/12/2013 03:45 AM    A-G Ratio 1.5 12/13/2017 01:33 PM    ALT (SGPT) 11 12/13/2017 01:33 PM     Lab Results   Component Value Date/Time    Hemoglobin A1c 5.1 03/07/2014 01:35 PM    Hemoglobin A1c 5.4 02/25/2013 02:09 PM      Lab Results   Component Value Date/Time Cholesterol, total 194 12/13/2017 01:33 PM    HDL Cholesterol 70 12/13/2017 01:33 PM    LDL, calculated 104 (H) 12/13/2017 01:33 PM    VLDL, calculated 20 12/13/2017 01:33 PM    Triglyceride 101 12/13/2017 01:33 PM          ASSESSMENT/PLAN  Diagnoses and all orders for this visit:    1. Essential hypertension  -     amLODIPine (NORVASC) 5 mg tablet; Take 1 Tab by mouth daily. Indications: hypertension  Not controlled, add above, stop valsartan and start losartan  2. Seasonal allergic rhinitis, unspecified trigger  -     fluticasone (FLONASE) 50 mcg/actuation nasal spray; 2 Sprays by Both Nostrils route daily. Add above for rhinorrhea, postnasal drip  Other orders  -     varicella-zoster recombinant, PF, (SHINGRIX, PF,) 50 mcg/0.5 mL susr injection; 0.5 mL by IntraMUSCular route once for 1 dose. -     amLODIPine (NORVASC) 5 mg tablet; Take 1 Tab by mouth daily. Health Maintenance Due   Topic Date Due    ZOSTER VACCINE AGE 60>  04/19/1990    Pneumococcal 65+ Low/Medium Risk (1 of 2 - PCV13) 06/19/1995        Follow-up Disposition:  Return in about 1 month (around 9/21/2018) for bp. Reviewed plan of care. Patient has provided input and agrees with goals. The nurse provided the patient and/or family with advanced directive information if needed and encouraged the patient to provide a copy to the office when available.

## 2018-08-21 NOTE — PROGRESS NOTES
Dg Vasu    Chief Complaint   Patient presents with    Blood Pressure Check     Room 2B//            Reviewed record In preparation for visit and have obtained necessary documentation    1. Have you been to the ER, urgent care clinic since your last visit? Hospitalized since your last visit? NO  2. Have you seen or consulted any other health care providers outside of the [facility] since your last visit? Include any pap smears or colon screening. NO    Patient has advance directive on file    Provider advised of reason for visit and vitals    Health Maintenance Due   Topic    ZOSTER VACCINE AGE 60>     Pneumococcal 65+ Low/Medium Risk (1 of 2 - PCV13)    Influenza Age 5 to Adult        Wt Readings from Last 3 Encounters:   07/12/18 122 lb 3.2 oz (55.4 kg)   02/15/18 126 lb (57.2 kg)   01/16/18 125 lb 3.2 oz (56.8 kg)     Temp Readings from Last 3 Encounters:   07/12/18 98 °F (36.7 °C) (Oral)   02/15/18 97.8 °F (36.6 °C) (Oral)   01/16/18 97.6 °F (36.4 °C) (Oral)     BP Readings from Last 3 Encounters:   07/12/18 (!) 180/96   02/15/18 150/80   01/16/18 188/80     Pulse Readings from Last 3 Encounters:   07/12/18 80   02/15/18 (!) 56   01/16/18 67         Learning Assessment:  :     No flowsheet data found. Depression Screening:  :     PHQ over the last two weeks 7/12/2018   Little interest or pleasure in doing things Not at all   Feeling down, depressed, irritable, or hopeless Not at all   Total Score PHQ 2 0       Fall Risk Assessment:  :     Fall Risk Assessment, last 12 mths 2/15/2018   Able to walk? Yes   Fall in past 12 months? No       Abuse Screening:  :     Abuse Screening Questionnaire 2/15/2018   Do you ever feel afraid of your partner? N   Are you in a relationship with someone who physically or mentally threatens you? N   Is it safe for you to go home?  Y       ADL Screening:  :     ADL Assessment 2/15/2018   Feeding yourself No Help Needed   Getting from bed to chair No Help Needed   Getting dressed No Help Needed   Bathing or showering No Help Needed   Walk across the room (includes cane/walker) No Help Needed   Using the telphone No Help Needed   Taking your medications No Help Needed   Preparing meals No Help Needed   Managing money (expenses/bills) No Help Needed   Moderately strenuous housework (laundry) No Help Needed   Shopping for personal items (toiletries/medicines) No Help Needed   Shopping for groceries Help Needed   Driving Help Needed   Climbing a flight of stairs No Help Needed   Getting to places beyond walking distances Help Needed           Medication reconciliation up to date and corrected with patient at this time.

## 2018-08-21 NOTE — MR AVS SNAPSHOT
[address] [address] [phone] Patient: Alexandra Noguera MRN: [id number] TRT:0/97/[id number] Visit Information Date & Time Provider Department Dept. Phone Encounter #  
 8/21/2018  1:30 PM Steffanie Man MD [facility] of [address] [place] [address] [id number] Follow-up Instructions Return in about 1 month (around 9/21/2018) for bp. Upcoming Health Maintenance Date Due ZOSTER VACCINE AGE 60> 4/19/1990 Pneumococcal 65+ Low/Medium Risk (1 of 2 - PCV13) 6/19/1995 Influenza Age 5 to Adult 9/29/2018* MEDICARE YEARLY EXAM 7/13/2019 GLAUCOMA SCREENING Q2Y 7/2/2020 DTaP/Tdap/Td series (2 - Td) 2/15/2028 *Topic was postponed. The date shown is not the original due date. Allergies as of 8/21/2018  Review Complete On: 8/21/2018 By: Steffanie Man MD  
 No Known Allergies Current Immunizations  Reviewed on 7/12/2018 No immunizations on file. Not reviewed this visit You Were Diagnosed With   
  
 Codes Comments Essential hypertension    -  Primary ICD-10-CM: I10 
ICD-9-CM: 401.9 Seasonal allergic rhinitis, unspecified trigger     ICD-10-CM: J30.2 ICD-9-CM: 477.9 Vitals BP Pulse Temp Resp Height(growth percentile) Weight(growth percentile) 152/84 64 97.7 °F (36.5 °C) (Oral) 16 4' 8\" (1.422 m) 122 lb (55.3 kg) SpO2 BMI OB Status Smoking Status 96% 27.35 kg/m2 Postmenopausal Never Smoker Vitals History BMI and BSA Data Body Mass Index Body Surface Area  
 27.35 kg/m 2 1.48 m 2 Preferred Pharmacy Pharmacy Name Phone [id number] Chaya Rodgers [phone] Your Updated Medication List  
  
   
This list is accurate as of 8/21/18  1:57 PM.  Always use your most recent med list.  
  
  
  
  
 * amLODIPine 5 mg tablet Commonly known as:  Arlene Colon Take 1 Tab by mouth daily. Indications: hypertension * amLODIPine 5 mg tablet Commonly known as:  Janet Sydnee Take 1 Tab by mouth daily. aspirin delayed-release 325 mg tablet Take 1 Tab by mouth daily. atorvastatin 20 mg tablet Commonly known as:  LIPITOR Take 1 Tab by mouth daily. BYSTOLIC 10 mg tablet Generic drug:  nebivolol TAKE 1 TABLET DAILY  
  
 calcium citrate 200 mg (950 mg) tablet Take 600 mg by mouth daily. CeleBREX 200 mg capsule Generic drug:  celecoxib Take 200 mg by mouth daily. fluticasone 50 mcg/actuation nasal spray Commonly known as:  Sandra Jumper 2 Sprays by Both Nostrils route daily. furosemide 20 mg tablet Commonly known as:  LASIX Take 1 Tab by mouth daily. Indications: Edema  
  
 losartan 50 mg tablet Commonly known as:  COZAAR Take 1 Tab by mouth two (2) times a day. omeprazole 20 mg capsule Commonly known as:  PRILOSEC Take 1 Cap by mouth daily. varicella-zoster recombinant (PF) 50 mcg/0.5 mL Susr injection Commonly known as:  SHINGRIX (PF)  
0.5 mL by IntraMUSCular route once for 1 dose. VITAMIN D3 2,000 unit Tab Generic drug:  cholecalciferol (vitamin D3) Take  by mouth daily. * Notice: This list has 2 medication(s) that are the same as other medications prescribed for you. Read the directions carefully, and ask your doctor or other care provider to review them with you. Prescriptions Printed Refills  
 varicella-zoster recombinant, PF, (SHINGRIX, PF,) 50 mcg/0.5 mL susr injection 1 Si.5 mL by IntraMUSCular route once for 1 dose. Class: Print Route: IntraMUSCular Prescriptions Sent to Pharmacy Refills  
 amLODIPine (NORVASC) 5 mg tablet 0 Sig: Take 1 Tab by mouth daily. Indications: hypertension Class: Normal  
 Pharmacy: [facility]/pharmacy #[id number] - [place], [address] [facility] Ph #: [phone]  Route: Oral  
 amLODIPine (NORVASC) 5 mg tablet 1 Sig: Take 1 Tab by mouth daily. Class: Normal  
 Pharmacy: [address], [address] [facility] Ph #: [phone] Route: Oral  
 fluticasone (FLONASE) 50 mcg/actuation nasal spray 2 Si Sprays by Both Nostrils route daily. Class: Normal  
 Pharmacy: [facility]/pharmacy #[id number] - [place] , [address] [facility] Ph #: [phone] Route: Both Nostrils Follow-up Instructions Return in about 1 month (around 2018) for bp. Introducing [facility]! Dear Cristiane Hollins: 
Thank you for requesting a [employer] account. Our records indicate that you already have an active [employer] account. You can access your account anytime at [email or web address] Did you know that you can access your hospital and ER discharge instructions at any time in [employer]? You can also review all of your test results from your hospital stay or ER visit. Additional Information If you have questions, please visit the Frequently Asked Questions section of the [employer] website at [email or web address] Remember, [employer] is NOT to be used for urgent needs. For medical emergencies, dial 911. Now available from your iPhone and Android! Please provide this summary of care documentation to your next provider. Your primary care clinician is listed as Mery Aden. If you have any questions after today's visit, please call [phone].

## 2018-08-27 RX ORDER — ASPIRIN 325 MG
325 TABLET, DELAYED RELEASE (ENTERIC COATED) ORAL DAILY
Qty: 90 TAB | Refills: 3 | Status: SHIPPED | OUTPATIENT
Start: 2018-08-27 | End: 2019-08-25 | Stop reason: SDUPTHER

## 2018-08-27 NOTE — TELEPHONE ENCOUNTER
Vicki Palencia [facility]   9/21/2018 11:1  Future Appointments  Date Time Provider Faisal Flores   9/21/2018 11:10 AM Vicki Palencia  W. Radha Fowler   0 AM Vicki Palencia  W. Radha Fowler        Pending Prescriptions Disp Refills    aspirin delayed-release 325 mg tablet 90 Tab 3     Sig: Take 1 Tab by mouth daily.

## 2018-09-21 ENCOUNTER — OFFICE VISIT (OUTPATIENT)
Dept: INTERNAL MEDICINE CLINIC | Facility: CLINIC | Age: 83
End: 2018-09-21

## 2018-09-21 VITALS
HEART RATE: 57 BPM | OXYGEN SATURATION: 96 % | TEMPERATURE: 97.9 F | RESPIRATION RATE: 16 BRPM | WEIGHT: 120 LBS | DIASTOLIC BLOOD PRESSURE: 80 MMHG | HEIGHT: 56 IN | SYSTOLIC BLOOD PRESSURE: 130 MMHG | BODY MASS INDEX: 26.99 KG/M2

## 2018-09-21 DIAGNOSIS — R50.9 FEVER, UNSPECIFIED FEVER CAUSE: ICD-10-CM

## 2018-09-21 DIAGNOSIS — I10 ESSENTIAL HYPERTENSION: Primary | ICD-10-CM

## 2018-09-21 DIAGNOSIS — Z23 ENCOUNTER FOR IMMUNIZATION: ICD-10-CM

## 2018-09-21 NOTE — PROGRESS NOTES
HPI  Ms. Aiden Wilson is a 80y.o. year old female, she is seen today for follow up HTN. Plan last visit:  Essential hypertension  -     amLODIPine (NORVASC) 5 mg tablet; Take 1 Tab by mouth daily. Indications: hypertension  Not controlled, add above, stop valsartan and start losartan     in hospital with UTI since last night. She misses him. Few nights ago had fever 101.8, no other symptoms except for shaking. Took tylenol and vomited immediately after taking it. Took tylenol again and fever was lower the next day around 98.9. Now back to normal. Had been extra active the day of fever - more errands. No chest pain, n/v/abd pain, cough, HA. Has sob with exertion x years unchanged. Mild edema both ankles not worse than usual.     BP log reveals: 130-196/, HR 54-74  More recent BP range in 150s/50s-60s. Chief Complaint   Patient presents with    Blood Pressure Check     Room 2A// NON fasting         Prior to Admission medications    Medication Sig Start Date End Date Taking? Authorizing Provider   aspirin delayed-release 325 mg tablet Take 1 Tab by mouth daily. 8/27/18  Yes Darshan Villegas MD   amLODIPine (NORVASC) 5 mg tablet Take 1 Tab by mouth daily. 8/21/18  Yes Darshan Villegas MD   fluticasone (FLONASE) 50 mcg/actuation nasal spray 2 Sprays by Both Nostrils route daily. 8/21/18  Yes Darshan Villegas MD   losartan (COZAAR) 50 mg tablet Take 1 Tab by mouth two (2) times a day. 7/30/18  Yes Darshan Villegas MD   BYSTOLIC 10 mg tablet TAKE 1 TABLET DAILY 7/15/18  Yes Darshan Villegas MD   furosemide (LASIX) 20 mg tablet Take 1 Tab by mouth daily. Indications: Edema 2/15/18  Yes Darshan Villegas MD   omeprazole (PRILOSEC) 20 mg capsule Take 1 Cap by mouth daily. 1/16/18  Yes Darshan Villegas MD   cholecalciferol, vitamin D3, (VITAMIN D3) 2,000 unit tab Take  by mouth daily. Yes Historical Provider   atorvastatin (LIPITOR) 20 mg tablet Take 1 Tab by mouth daily.  12/7/17 Yes Lia Francisco MD   celecoxib (CELEBREX) 200 mg capsule Take 200 mg by mouth daily. Yes Historical Provider   calcium citrate 200 mg (950 mg) tablet Take 600 mg by mouth daily. Yes Historical Provider         No Known Allergies      REVIEW OF SYSTEMS:  Per HPI    PHYSICAL EXAM:  Visit Vitals    /80    Pulse (!) 57    Temp 97.9 °F (36.6 °C) (Oral)    Resp 16    Ht 4' 8\" (1.422 m)    Wt 120 lb (54.4 kg)    SpO2 96%    BMI 26.9 kg/m2     Constitutional: Appears well-developed and well-nourished. No distress. HENT:   Head: Normocephalic and atraumatic. Eyes: No scleral icterus. Cardiovascular: Normal S1/S2, regular rhythm. No murmurs, rubs, or gallops. Pulmonary/Chest: Effort normal and breath sounds normal. No respiratory distress. No wheezes, rhonchi, or rales. Ext: No edema. Neurological: Alert. Psychiatric: Normal mood and affect. Behavior is normal.     Lab Results   Component Value Date/Time    Sodium 140 07/12/2018 01:50 PM    Potassium 4.9 07/12/2018 01:50 PM    Chloride 100 07/12/2018 01:50 PM    CO2 24 07/12/2018 01:50 PM    Anion gap 9 03/26/2014 05:00 AM    Glucose 81 07/12/2018 01:50 PM    BUN 21 07/12/2018 01:50 PM    Creatinine 0.87 07/12/2018 01:50 PM    BUN/Creatinine ratio 24 07/12/2018 01:50 PM    GFR est AA 69 07/12/2018 01:50 PM    GFR est non-AA 60 07/12/2018 01:50 PM    Calcium 9.4 07/12/2018 01:50 PM    Bilirubin, total 0.7 12/13/2017 01:33 PM    AST (SGOT) 20 12/13/2017 01:33 PM    Alk.  phosphatase 88 12/13/2017 01:33 PM    Protein, total 6.6 12/13/2017 01:33 PM    Albumin 4.0 12/13/2017 01:33 PM    Globulin 3.3 03/12/2013 03:45 AM    A-G Ratio 1.5 12/13/2017 01:33 PM    ALT (SGPT) 11 12/13/2017 01:33 PM     Lab Results   Component Value Date/Time    Hemoglobin A1c 5.1 03/07/2014 01:35 PM    Hemoglobin A1c 5.4 02/25/2013 02:09 PM      Lab Results   Component Value Date/Time    Cholesterol, total 194 12/13/2017 01:33 PM    HDL Cholesterol 70 12/13/2017 01:33 PM    LDL, calculated 104 (H) 12/13/2017 01:33 PM    VLDL, calculated 20 12/13/2017 01:33 PM    Triglyceride 101 12/13/2017 01:33 PM          ASSESSMENT/PLAN  Diagnoses and all orders for this visit:    1. Essential hypertension  Much better control - continue current medications   2. Encounter for immunization  -     Influenza Vaccine Inactivated (IIV)(FLUAD), Subunit, Adjuvanted, IM, (70968)  -     Administration fee () for Medicare insured patients    3. Fever, unspecified fever cause  Resolved - suspect viral cause now resolved  Other orders  -     varicella-zoster recombinant, PF, (SHINGRIX, PF,) 50 mcg/0.5 mL susr injection; 0.5 mL by IntraMUSCular route once for 1 dose. Health Maintenance Due   Topic Date Due    ZOSTER VACCINE AGE 60>  04/19/1990    Pneumococcal 65+ Low/Medium Risk (1 of 2 - PCV13) 06/19/1995        Follow-up Disposition:  Return in about 3 months (around 12/21/2018) for bp. Reviewed plan of care. Patient has provided input and agrees with goals. The nurse provided the patient and/or family with advanced directive information if needed and encouraged the patient to provide a copy to the office when available.

## 2018-09-21 NOTE — MR AVS SNAPSHOT
[address] [address] [phone] Patient: My Vásquez MRN: [id number] CQM:[id number]/13/[id number] Visit Information Date & Time Provider Department Dept. Phone Encounter #  
 9/21/2018 11:10 AM Daniella Mccall MD [facility] Internal Medicine [address] [id number] Follow-up Instructions Return in about 3 months (around 12/21/2018) for bp. Upcoming Health Maintenance Date Due ZOSTER VACCINE AGE 60> 4/19/1990 Pneumococcal 65+ Low/Medium Risk (1 of 2 - PCV13) 6/19/1995 Influenza Age 5 to Adult 9/29/2018* MEDICARE YEARLY EXAM 7/13/2019 GLAUCOMA SCREENING Q2Y 7/2/2020 DTaP/Tdap/Td series (2 - Td) 2/15/2028 *Topic was postponed. The date shown is not the original due date. Allergies as of 9/21/2018  Review Complete On: 9/21/2018 By: Daniella Mccall MD  
 No Known Allergies Current Immunizations  Reviewed on 7/12/2018 Name Date Influenza Vaccine (Tri) Adjuvanted  Incomplete Not reviewed this visit You Were Diagnosed With   
  
 Codes Comments Essential hypertension    -  Primary ICD-10-CM: I10 
ICD-9-CM: 401.9 Encounter for immunization     ICD-10-CM: B23 ICD-9-CM: V03.89 Vitals BP Pulse Temp Resp Height(growth percentile) Weight(growth percentile) 130/80 (!) 57 97.9 °F (36.6 °C) (Oral) 16 4' 8\" (1.422 m) 120 lb (54.4 kg) SpO2 BMI OB Status Smoking Status 96% 26.9 kg/m2 Postmenopausal Never Smoker Vitals History BMI and BSA Data Body Mass Index Body Surface Area  
 26.9 kg/m 2 1.47 m 2 Preferred Pharmacy Pharmacy Name Phone Malka Mcneill, [facility] [phone] Your Updated Medication List  
  
   
This list is accurate as of 9/21/18 11:35 AM.  Always use your most recent med list. amLODIPine 5 mg tablet Commonly known as:  Gino Page Take 1 Tab by mouth daily. aspirin delayed-release 325 mg tablet Take 1 Tab by mouth daily. atorvastatin 20 mg tablet Commonly known as:  LIPITOR Take 1 Tab by mouth daily. BYSTOLIC 10 mg tablet Generic drug:  nebivolol TAKE 1 TABLET DAILY  
  
 calcium citrate 200 mg (950 mg) tablet Take 600 mg by mouth daily. CeleBREX 200 mg capsule Generic drug:  celecoxib Take 200 mg by mouth daily. fluticasone 50 mcg/actuation nasal spray Commonly known as:  Jared Cyphers 2 Sprays by Both Nostrils route daily. furosemide 20 mg tablet Commonly known as:  LASIX Take 1 Tab by mouth daily. Indications: Edema  
  
 losartan 50 mg tablet Commonly known as:  COZAAR Take 1 Tab by mouth two (2) times a day. omeprazole 20 mg capsule Commonly known as:  PRILOSEC Take 1 Cap by mouth daily. varicella-zoster recombinant (PF) 50 mcg/0.5 mL Susr injection Commonly known as:  SHINGRIX (PF)  
0.5 mL by IntraMUSCular route once for 1 dose. VITAMIN D3 2,000 unit Tab Generic drug:  cholecalciferol (vitamin D3) Take  by mouth daily. Prescriptions Printed Refills  
 varicella-zoster recombinant, PF, (SHINGRIX, PF,) 50 mcg/0.5 mL susr injection 1 Si.5 mL by IntraMUSCular route once for 1 dose. Class: Print Route: IntraMUSCular We Performed the Following ADMIN INFLUENZA VIRUS VAC [ \[facility] [place] [facility]\""] INFLUENZA VACCINE INACTIVATED (IIV), SUBUNIT, ADJUVANTED, IM [id number] CPT(R)] Follow-up Instructions Return in about 3 months (around 2018) for bp. Patient Instructions Vaccine Information Statement Influenza (Flu) Vaccine (Inactivated or Recombinant): What you need to know Many Vaccine Information Statements are available in Tongan and other languages. See www.immunize.org/vis Hojas de Información Sobre Vacunas están disponibles en Español y en muchos otros idiomas. Visite www.immunize.org/vis 1. Why get vaccinated? Influenza (flu) is a contagious disease that spreads around the [place] every year, usually between October and May. Flu is caused by influenza viruses, and is spread mainly by coughing, sneezing, and close contact. Anyone can get flu. Flu strikes suddenly and can last several days. Symptoms vary by age, but can include: 
 fever/chills  sore throat  muscle aches  fatigue  cough  headache  runny or stuffy nose Flu can also lead to pneumonia and blood infections, and cause diarrhea and seizures in children. If you have a medical condition, such as heart or lung disease, flu can make it worse. Flu is more dangerous for some people. Infants and young children, people 72years of age and older, pregnant women, and people with certain health conditions or a weakened immune system are at greatest risk. Each year thousands of people in the [facility] die from flu, and many more are hospitalized. Flu vaccine can: 
 keep you from getting flu, 
 make flu less severe if you do get it, and 
 keep you from spreading flu to your family and other people. 2. Inactivated and recombinant flu vaccines A dose of flu vaccine is recommended every flu season. Children 6 months through 6years of age may need two doses during the same flu season. Everyone else needs only one dose each flu season. Some inactivated flu vaccines contain a very small amount of a mercury-based preservative called thimerosal. Studies have not shown thimerosal in vaccines to be harmful, but flu vaccines that do not contain thimerosal are available. There is no live flu virus in flu shots. They cannot cause the flu. There are many flu viruses, and they are always changing. Each year a new flu vaccine is made to protect against three or four viruses that are likely to cause disease in the upcoming flu season.  But even when the vaccine doesnt exactly match these viruses, it may still provide some protection Flu vaccine cannot prevent: 
 flu that is caused by a virus not covered by the vaccine, or 
 illnesses that look like flu but are not. It takes about 2 weeks for protection to develop after vaccination, and protection lasts through the flu season. 3. Some people should not get this vaccine Tell the person who is giving you the vaccine:  If you have any severe, life-threatening allergies. If you ever had a life-threatening allergic reaction after a dose of flu vaccine, or have a severe allergy to any part of this vaccine, you may be advised not to get vaccinated. Most, but not all, types of flu vaccine contain a small amount of egg protein.  If you ever had Guillain-Barré Syndrome (also called GBS). Some people with a history of GBS should not get this vaccine. This should be discussed with your doctor.  If you are not feeling well. It is usually okay to get flu vaccine when you have a mild illness, but you might be asked to come back when you feel better. 4. Risks of a vaccine reaction With any medicine, including vaccines, there is a chance of reactions. These are usually mild and go away on their own, but serious reactions are also possible. Most people who get a flu shot do not have any problems with it. Minor problems following a flu shot include:  
 soreness, redness, or swelling where the shot was given  hoarseness  sore, red or itchy eyes  cough  fever  aches  headache  itching  fatigue If these problems occur, they usually begin soon after the shot and last 1 or 2 days. More serious problems following a flu shot can include the following:  There may be a small increased risk of Guillain-Barré Syndrome (GBS) after inactivated flu vaccine.   This risk has been estimated at 1 or 2 additional cases per million people vaccinated. This is much lower than the risk of severe complications from flu, which can be prevented by flu vaccine.  Young children who get the flu shot along with pneumococcal vaccine (PCV13) and/or DTaP vaccine at the same time might be slightly more likely to have a seizure caused by fever. Ask your doctor for more information. Tell your doctor if a child who is getting flu vaccine has ever had a seizure. Problems that could happen after any injected vaccine:  People sometimes faint after a medical procedure, including vaccination. Sitting or lying down for about 15 minutes can help prevent fainting, and injuries caused by a fall. Tell your doctor if you feel dizzy, or have vision changes or ringing in the ears.  Some people get severe pain in the shoulder and have difficulty moving the arm where a shot was given. This happens very rarely.  Any medication can cause a severe allergic reaction. Such reactions from a vaccine are very rare, estimated at about 1 in a million doses, and would happen within a few minutes to a few hours after the vaccination. As with any medicine, there is a very remote chance of a vaccine causing a serious injury or death. The safety of vaccines is always being monitored. For more information, visit: www.cdc.gov/vaccinesafety/ 
 
5. What if there is a serious reaction? What should I look for?  Look for anything that concerns you, such as signs of a severe allergic reaction, very high fever, or unusual behavior. Signs of a severe allergic reaction can include hives, swelling of the face and throat, difficulty breathing, a fast heartbeat, dizziness, and weakness  usually within a few minutes to a few hours after the vaccination. What should I do?  
 
 If you think it is a severe allergic reaction or other emergency that cant wait, call 9-1-1 and get the person to the nearest hospital. Otherwise, call your doctor.  Reactions should be reported to the Vaccine Adverse Event Reporting System (VAERS). Your doctor should file this report, or you can do it yourself through  the VAERS web site at www.vaers. hhs.gov, or by calling 7-676.626.4330. VAERS does not give medical advice. 6. The National Vaccine Injury Compensation Program 
 
The [facility] Vaccine Injury Compensation Program (VICP) is a federal program that was created to compensate people who may have been injured by certain vaccines. Persons who believe they may have been injured by a vaccine can learn about the program and about filing a claim by calling 8-628.661.8966 or visiting the [employer] website at www.[facility].gov/vaccinecompensation. There is a time limit to file a claim for compensation. 7. How can I learn more?  Ask your healthcare provider. He or she can give you the vaccine package insert or suggest other sources of information.  Call your local or state health department.  Contact the Centers for Disease Control and Prevention (CDC): 
- Call 0-958.198.5856 (1-800-CDC-INFO) or 
- Visit CDCs website at www.cdc.gov/flu Vaccine Information Statement Inactivated Influenza Vaccine 8/7/2015 
[address] [facility] High [id number] [facility] and [facility] Centers for Disease Control and Prevention Office Use Only [facility]! Dear Jefe Gonzales: 
Thank you for requesting a [employer] account. Our records indicate that you already have an active [employer]Cube account. You can access your account anytime at [email or web address] Did you know that you can access your hospital and ER discharge instructions at any time in ViaCube? You can also review all of your test results from your hospital stay or ER visit. Additional Information If you have questions, please visit the Frequently Asked Questions section of the [employer] website at [email or web address] Remember, MyChart is NOT to be used for urgent needs. For medical emergencies, dial 911. Now available from your iPhone and Android! Please provide this summary of care documentation to your next provider. Your primary care clinician is listed as Mery Aden. If you have any questions after today's visit, please call [phone].

## 2018-09-21 NOTE — PROGRESS NOTES
Juliane Bui  Identified pt with two pt identifiers(name and ). Chief Complaint   Patient presents with    Blood Pressure Check     Room 2A// NON fasting        1. Have you been to the ER, urgent care clinic since your last visit? Hospitalized since your last visit? NO    2. Have you seen or consulted any other health care providers outside of the [facility] since your last visit? Include any pap smears or colon screening. NO      Provider notified of reason for visit, vitals and flowsheets obtained on patients. Patient received paperwork for advance directive during previous visit but has not completed at this time     Reviewed record In preparation for visit, huddled with provider and have obtained necessary documentation      Health Maintenance Due   Topic    ZOSTER VACCINE AGE 60>     Pneumococcal 65+ Low/Medium Risk (1 of 2 - PCV13)       Wt Readings from Last 3 Encounters:   18 122 lb (55.3 kg)   18 122 lb 3.2 oz (55.4 kg)   02/15/18 126 lb (57.2 kg)     Temp Readings from Last 3 Encounters:   18 97.7 °F (36.5 °C) (Oral)   18 98 °F (36.7 °C) (Oral)   02/15/18 97.8 °F (36.6 °C) (Oral)     BP Readings from Last 3 Encounters:   18 152/84   18 (!) 180/96   02/15/18 150/80     Pulse Readings from Last 3 Encounters:   18 64   18 80   02/15/18 (!) 56     There were no vitals filed for this visit. Learning Assessment:  :     No flowsheet data found. Depression Screening:  :     PHQ over the last two weeks 2018   Little interest or pleasure in doing things Not at all   Feeling down, depressed, irritable, or hopeless Not at all   Total Score PHQ 2 0       Fall Risk Assessment:  :     Fall Risk Assessment, last 12 mths 2/15/2018   Able to walk? Yes   Fall in past 12 months? No       Abuse Screening:  :     Abuse Screening Questionnaire 2/15/2018   Do you ever feel afraid of your partner?  N   Are you in a relationship with someone who physically or mentally threatens you? N   Is it safe for you to go home? Y       ADL Screening:  :     ADL Assessment 2/15/2018   Feeding yourself No Help Needed   Getting from bed to chair No Help Needed   Getting dressed No Help Needed   Bathing or showering No Help Needed   Walk across the room (includes cane/walker) No Help Needed   Using the telphone No Help Needed   Taking your medications No Help Needed   Preparing meals No Help Needed   Managing money (expenses/bills) No Help Needed   Moderately strenuous housework (laundry) No Help Needed   Shopping for personal items (toiletries/medicines) No Help Needed   Shopping for groceries Help Needed   Driving Help Needed   Climbing a flight of stairs No Help Needed   Getting to places beyond walking distances Help Needed         Medication reconciliation up to date and corrected with patient at this time.

## 2018-09-21 NOTE — PATIENT INSTRUCTIONS
Vaccine Information Statement    Influenza (Flu) Vaccine (Inactivated or Recombinant): What you need to know    Many Vaccine Information Statements are available in Nepali and other languages. See www.immunize.org/vis  Hojas de Información Sobre Vacunas están disponibles en Español y en muchos otros idiomas. Visite www.immunize.org/vis    1. Why get vaccinated? Influenza (flu) is a contagious disease that spreads around the [place] every year, usually between October and May. Flu is caused by influenza viruses, and is spread mainly by coughing, sneezing, and close contact. Anyone can get flu. Flu strikes suddenly and can last several days. Symptoms vary by age, but can include:   fever/chills   sore throat   muscle aches   fatigue   cough   headache    runny or stuffy nose    Flu can also lead to pneumonia and blood infections, and cause diarrhea and seizures in children. If you have a medical condition, such as heart or lung disease, flu can make it worse. Flu is more dangerous for some people. Infants and young children, people 72years of age and older, pregnant women, and people with certain health conditions or a weakened immune system are at greatest risk. Each year thousands of people in the [facility] die from flu, and many more are hospitalized. Flu vaccine can:   keep you from getting flu,   make flu less severe if you do get it, and   keep you from spreading flu to your family and other people. 2. Inactivated and recombinant flu vaccines    A dose of flu vaccine is recommended every flu season. Children 6 months through 6years of age may need two doses during the same flu season. Everyone else needs only one dose each flu season.        Some inactivated flu vaccines contain a very small amount of a mercury-based preservative called thimerosal. Studies have not shown thimerosal in vaccines to be harmful, but flu vaccines that do not contain thimerosal are available. There is no live flu virus in flu shots. They cannot cause the flu. There are many flu viruses, and they are always changing. Each year a new flu vaccine is made to protect against three or four viruses that are likely to cause disease in the upcoming flu season. But even when the vaccine doesnt exactly match these viruses, it may still provide some protection    Flu vaccine cannot prevent:   flu that is caused by a virus not covered by the vaccine, or   illnesses that look like flu but are not. It takes about 2 weeks for protection to develop after vaccination, and protection lasts through the flu season. 3. Some people should not get this vaccine    Tell the person who is giving you the vaccine:     If you have any severe, life-threatening allergies. If you ever had a life-threatening allergic reaction after a dose of flu vaccine, or have a severe allergy to any part of this vaccine, you may be advised not to get vaccinated. Most, but not all, types of flu vaccine contain a small amount of egg protein.  If you ever had Guillain-Barré Syndrome (also called GBS). Some people with a history of GBS should not get this vaccine. This should be discussed with your doctor.  If you are not feeling well. It is usually okay to get flu vaccine when you have a mild illness, but you might be asked to come back when you feel better. 4. Risks of a vaccine reaction    With any medicine, including vaccines, there is a chance of reactions. These are usually mild and go away on their own, but serious reactions are also possible. Most people who get a flu shot do not have any problems with it.      Minor problems following a flu shot include:    soreness, redness, or swelling where the shot was given     hoarseness   sore, red or itchy eyes   cough   fever   aches   headache   itching   fatigue  If these problems occur, they usually begin soon after the shot and last 1 or 2 days. More serious problems following a flu shot can include the following:     There may be a small increased risk of Guillain-Barré Syndrome (GBS) after inactivated flu vaccine. This risk has been estimated at 1 or 2 additional cases per million people vaccinated. This is much lower than the risk of severe complications from flu, which can be prevented by flu vaccine.  Young children who get the flu shot along with pneumococcal vaccine (PCV13) and/or DTaP vaccine at the same time might be slightly more likely to have a seizure caused by fever. Ask your doctor for more information. Tell your doctor if a child who is getting flu vaccine has ever had a seizure. Problems that could happen after any injected vaccine:      People sometimes faint after a medical procedure, including vaccination. Sitting or lying down for about 15 minutes can help prevent fainting, and injuries caused by a fall. Tell your doctor if you feel dizzy, or have vision changes or ringing in the ears.  Some people get severe pain in the shoulder and have difficulty moving the arm where a shot was given. This happens very rarely.  Any medication can cause a severe allergic reaction. Such reactions from a vaccine are very rare, estimated at about 1 in a million doses, and would happen within a few minutes to a few hours after the vaccination. As with any medicine, there is a very remote chance of a vaccine causing a serious injury or death. The safety of vaccines is always being monitored. For more information, visit: www.cdc.gov/vaccinesafety/    5. What if there is a serious reaction? What should I look for?  Look for anything that concerns you, such as signs of a severe allergic reaction, very high fever, or unusual behavior.     Signs of a severe allergic reaction can include hives, swelling of the face and throat, difficulty breathing, a fast heartbeat, dizziness, and weakness - usually within a few minutes to a few hours after the vaccination. What should I do?  If you think it is a severe allergic reaction or other emergency that cant wait, call 9-1-1 and get the person to the nearest hospital. Otherwise, call your doctor.  Reactions should be reported to the Vaccine Adverse Event Reporting System (VAERS). Your doctor should file this report, or you can do it yourself through  the VAERS web site at www.vaers. [facility].gov, or by calling 9-890.279.8531. VAERS does not give medical advice. 6. The National Vaccine Injury Compensation Program    The [facility] Vaccine Injury Compensation Program (VICP) is a federal program that was created to compensate people who may have been injured by certain vaccines. Persons who believe they may have been injured by a vaccine can learn about the program and about filing a claim by calling 6-981.575.4095 or visiting the [employer] website at www.[facility].gov/vaccinecompensation. There is a time limit to file a claim for compensation. 7. How can I learn more?  Ask your healthcare provider. He or she can give you the vaccine package insert or suggest other sources of information.  Call your local or state health department.  Contact the Centers for Disease Control and Prevention (CDC):  - Call 5-422.787.9062 (1-800-CDC-INFO) or  - Visit CDCs website at www.cdc.gov/flu    Vaccine Information Statement   Inactivated Influenza Vaccine   8/7/2015  42 JONATHAN Art [id number]-26    Department of Health and Human Services  Centers for Disease Control and Prevention    Office Use Only

## 2018-11-18 DIAGNOSIS — G45.9 TRANSIENT CEREBRAL ISCHEMIA, UNSPECIFIED TYPE: ICD-10-CM

## 2018-11-19 RX ORDER — ATORVASTATIN CALCIUM 20 MG/1
TABLET, FILM COATED ORAL
Qty: 90 TAB | Refills: 3 | Status: SHIPPED | OUTPATIENT
Start: 2018-11-19 | End: 2019-11-16 | Stop reason: SDUPTHER

## 2018-12-20 ENCOUNTER — OFFICE VISIT (OUTPATIENT)
Dept: INTERNAL MEDICINE CLINIC | Facility: CLINIC | Age: 83
End: 2018-12-20

## 2018-12-20 VITALS
WEIGHT: 118.2 LBS | BODY MASS INDEX: 26.59 KG/M2 | TEMPERATURE: 97.7 F | OXYGEN SATURATION: 98 % | SYSTOLIC BLOOD PRESSURE: 164 MMHG | HEIGHT: 56 IN | DIASTOLIC BLOOD PRESSURE: 70 MMHG | RESPIRATION RATE: 16 BRPM | HEART RATE: 58 BPM

## 2018-12-20 DIAGNOSIS — E78.5 HYPERLIPIDEMIA, UNSPECIFIED HYPERLIPIDEMIA TYPE: ICD-10-CM

## 2018-12-20 DIAGNOSIS — M79.602 LEFT ARM PAIN: ICD-10-CM

## 2018-12-20 DIAGNOSIS — Z23 ENCOUNTER FOR IMMUNIZATION: ICD-10-CM

## 2018-12-20 DIAGNOSIS — I10 ESSENTIAL HYPERTENSION: Primary | ICD-10-CM

## 2018-12-20 DIAGNOSIS — I73.00 RAYNAUD'S DISEASE WITHOUT GANGRENE: ICD-10-CM

## 2018-12-20 NOTE — PROGRESS NOTES
HPI  Ms. Nessa Mccurdy is a 80y.o. year old female, she is seen today for follow up HTN. Since last visit  in hospice at home. Has lost 4# since August. Eats small portions. Has been feeling pretty good. Has sob walking up stairs, no chest pain. Chronic dyspnea walking up stairs. One night had pain down left arm going down hand all night. No associated symptoms including nausea, diaphoresis, chest pain or neck or jaw pain. Patient cares for her  pretty much completely. BP log reveals: [id number]  Several weeks ago BP was low and I advised stopping amlodipine and then to decrease losartan from 50mg bid to 50mg once daily if BP remained low. She hasn't been taking amlodipine or morning dose of losartan. Reports fingers will turn white, cold suddenly then blue then red. Not painful. Chief Complaint   Patient presents with    Blood Pressure Check     Room 2A//         Prior to Admission medications    Medication Sig Start Date End Date Taking? Authorizing Provider   atorvastatin (LIPITOR) 20 mg tablet TAKE 1 TABLET DAILY 11/19/18  Yes Yves Herr MD   aspirin delayed-release 325 mg tablet Take 1 Tab by mouth daily. 8/27/18  Yes Yves Herr MD   losartan (COZAAR) 50 mg tablet Take 1 Tab by mouth two (2) times a day. 7/30/18  Yes Yves Herr MD   BYSTOLIC 10 mg tablet TAKE 1 TABLET DAILY 7/15/18  Yes Yves Herr MD   furosemide (LASIX) 20 mg tablet Take 1 Tab by mouth daily. Indications: Edema 2/15/18  Yes Yves Herr MD   omeprazole (PRILOSEC) 20 mg capsule Take 1 Cap by mouth daily. 1/16/18  Yes Yves Herr MD   celecoxib (CELEBREX) 200 mg capsule Take 200 mg by mouth daily. Yes Provider, Historical   fluticasone (FLONASE) 50 mcg/actuation nasal spray 2 Sprays by Both Nostrils route daily. 8/21/18   Yves Herr MD   cholecalciferol, vitamin D3, (VITAMIN D3) 2,000 unit tab Take  by mouth daily.     Provider, Historical No Known Allergies      REVIEW OF SYSTEMS:  Per HPI    PHYSICAL EXAM:  Visit Vitals  /70   Pulse (!) 58   Temp 97.7 °F (36.5 °C) (Oral)   Resp 16   Ht 4' 8\" (1.422 m)   Wt 118 lb 3.2 oz (53.6 kg)   SpO2 98%   BMI 26.50 kg/m²     Constitutional: Appears well-developed and well-nourished. No distress. HENT:   Head: Normocephalic and atraumatic. Eyes: No scleral icterus. Neck: no pain on palpation  Cardiovascular: Normal S1/S2, regular rhythm. No murmurs, rubs, or gallops. Pulmonary/Chest: Effort normal and breath sounds normal. No respiratory distress. No wheezes, rhonchi, or rales. Abdomen: Soft, NT/ND, +BS, no rebound or guarding, no masses, no HSM appreciated. Left shoulder: no pain on palpation, +crepitus on ROM testing but no pain  Ext: No edema. 2+ radial pulses b/l  Neurological: Alert. Strength 5/5 b/l UE  Psychiatric: Normal mood and affect. Behavior is normal.     Lab Results   Component Value Date/Time    Sodium 140 07/12/2018 01:50 PM    Potassium 4.9 07/12/2018 01:50 PM    Chloride 100 07/12/2018 01:50 PM    CO2 24 07/12/2018 01:50 PM    Anion gap 9 03/26/2014 05:00 AM    Glucose 81 07/12/2018 01:50 PM    BUN 21 07/12/2018 01:50 PM    Creatinine 0.87 07/12/2018 01:50 PM    BUN/Creatinine ratio 24 07/12/2018 01:50 PM    GFR est AA 69 07/12/2018 01:50 PM    GFR est non-AA 60 07/12/2018 01:50 PM    Calcium 9.4 07/12/2018 01:50 PM    Bilirubin, total 0.7 12/13/2017 01:33 PM    AST (SGOT) 20 12/13/2017 01:33 PM    Alk.  phosphatase 88 12/13/2017 01:33 PM    Protein, total 6.6 12/13/2017 01:33 PM    Albumin 4.0 12/13/2017 01:33 PM    Globulin 3.3 03/12/2013 03:45 AM    A-G Ratio 1.5 12/13/2017 01:33 PM    ALT (SGPT) 11 12/13/2017 01:33 PM     Lab Results   Component Value Date/Time    Hemoglobin A1c 5.1 03/07/2014 01:35 PM    Hemoglobin A1c 5.4 02/25/2013 02:09 PM      Lab Results   Component Value Date/Time    Cholesterol, total 194 12/13/2017 01:33 PM    HDL Cholesterol 70 12/13/2017 01:33 PM    LDL, calculated 104 (H) 12/13/2017 01:33 PM    VLDL, calculated 20 12/13/2017 01:33 PM    Triglyceride 101 12/13/2017 01:33 PM          ASSESSMENT/PLAN  Diagnoses and all orders for this visit:    1. Essential hypertension  -     AMB POC EKG ROUTINE W/ 12 LEADS, INTER & REP  -     METABOLIC PANEL, COMPREHENSIVE  -     CBC WITH AUTOMATED DIFF  Sinus bradycardia with LVH - no significant change c/w 3/7/14   Arm pain may have been MSK or referred pain from neck  BP not controlled, increase losartan back to 50mg bid    2. Left arm pain  -     AMB POC EKG ROUTINE W/ 12 LEADS, INTER & REP    3. Raynaud's disease without gangrene    4. Hyperlipidemia, unspecified hyperlipidemia type  -     LIPID PANEL          Health Maintenance Due   Topic Date Due    Pneumococcal 65+ Low/Medium Risk (1 of 2 - PCV13) 06/19/1995        Follow-up Disposition:  Return in about 3 months (around 3/20/2019) for bp. Reviewed plan of care. Patient has provided input and agrees with goals. The nurse provided the patient and/or family with advanced directive information if needed and encouraged the patient to provide a copy to the office when available.

## 2018-12-20 NOTE — PROGRESS NOTES
Lenora Cogan    Chief Complaint   Patient presents with    Blood Pressure Check     Room 2A//            Reviewed record In preparation for visit and have obtained necessary documentation    1. Have you been to the ER, urgent care clinic since your last visit? Hospitalized since your last visit? NO  2. Have you seen or consulted any other health care providers outside of the [address] [place] [address] since your last visit? Include any pap smears or colon screening. NO    Patient has advance directive on file    Provider advised of reason for visit and vitals    Health Maintenance Due   Topic    Shingrix Vaccine Age 50> (1 of 2)    Pneumococcal 65+ Low/Medium Risk (1 of 2 - PCV13)       Wt Readings from Last 3 Encounters:   09/21/18 120 lb (54.4 kg)   08/21/18 122 lb (55.3 kg)   07/12/18 122 lb 3.2 oz (55.4 kg)     Temp Readings from Last 3 Encounters:   09/21/18 97.9 °F (36.6 °C) (Oral)   08/21/18 97.7 °F (36.5 °C) (Oral)   07/12/18 98 °F (36.7 °C) (Oral)     BP Readings from Last 3 Encounters:   09/21/18 130/80   08/21/18 152/84   07/12/18 (!) 180/96     Pulse Readings from Last 3 Encounters:   09/21/18 (!) 57   08/21/18 64   07/12/18 80         Learning Assessment:  :     No flowsheet data found. Depression Screening:  :     PHQ over the last two weeks 7/12/2018   Little interest or pleasure in doing things Not at all   Feeling down, depressed, irritable, or hopeless Not at all   Total Score PHQ 2 0       Fall Risk Assessment:  :     Fall Risk Assessment, last 12 mths 2/15/2018   Able to walk? Yes   Fall in past 12 months? No       Abuse Screening:  :     Abuse Screening Questionnaire 2/15/2018   Do you ever feel afraid of your partner? N   Are you in a relationship with someone who physically or mentally threatens you? N   Is it safe for you to go home?  Y       ADL Screening:  :     ADL Assessment 2/15/2018   Feeding yourself No Help Needed   Getting from bed to chair No Help Needed   Getting dressed No Help Needed   Bathing or showering No Help Needed   Walk across the room (includes cane/walker) No Help Needed   Using the telphone No Help Needed   Taking your medications No Help Needed   Preparing meals No Help Needed   Managing money (expenses/bills) No Help Needed   Moderately strenuous housework (laundry) No Help Needed   Shopping for personal items (toiletries/medicines) No Help Needed   Shopping for groceries Help Needed   Driving Help Needed   Climbing a flight of stairs No Help Needed   Getting to places beyond walking distances Help Needed           Medication reconciliation up to date and corrected with patient at this time.

## 2018-12-20 NOTE — PATIENT INSTRUCTIONS
Restart losartan 50mg twice a day. Raynaud's: Care Instructions  Your Care Instructions  Raynaud's is a condition that causes your hands and feet to overreact to cold. They may become painful and numb, and they can change colors, becoming very pale and then blue. This condition also is called Raynaud's phenomenon. There are two kinds of Raynaud's. Primary Raynaud's, also known as Raynaud's disease, happens by itself and is the most common form. Secondary Raynaud's, also called Raynaud's syndrome, happens as part of another disease. In Raynaud's, the small vessels that bring blood to the skin either become narrow, or constrict for a short period of time. This limits blood flow to the hands and feet and sometimes to the nose or ears. Your hands and feet feel cold and numb and then turn very pale. As blood flow returns, your fingers and toes may turn red, and begin to throb and hurt. Raynaud's can be painful and annoying, but it usually does not cause serious problems. You can take simple steps to protect your hands and feet from the cold. If you have a bad case of Raynaud's and you cannot keep your hands and feet warm enough, your doctor may prescribe medicine. Follow-up care is a key part of your treatment and safety. Be sure to make and go to all appointments, and call your doctor if you are having problems. It's also a good idea to know your test results and keep a list of the medicines you take. How can you care for yourself at home? To prevent Raynaud's episodes or ease symptoms  · Run warm water over your hands or feet to increase blood flow. Use another part of your body, such as your forearm, to make sure the water is not too hot; you could burn your hands or feet and not feel it because they are numb. · Swing your arms in a [place] at the sides of your body (\"windmilling\") to increase blood flow. · If your doctor prescribes medicine to help Raynaud's, take it exactly as prescribed.  Call your doctor if you think you are having a problem with your medicine. · If another condition causes your Raynaud's, make sure to follow your treatment for that condition. · Wear mittens or gloves when it is cold outside. Mittens are warmer than gloves because they keep your fingers together. Gloves underneath mittens will keep your hands warmer than gloves alone. You also can use pot holders or oven mitts when getting something from the freezer or refrigerator. · You can slip chemical hand warmers into your mittens or gloves when you do outside activities. · Do not smoke. Nicotine makes blood vessels constrict, which can bring on an attack. If you need help quitting, talk to your doctor about stop-smoking programs and medicines. These can increase your chances of quitting for good. · Avoid caffeine and cold medicines that have pseudoephedrine. They make blood vessels constrict. Beta-blocker medicines, often used to treat high blood pressure, also can make Raynaud's worse. · Drink plenty of fluids to prevent dehydration, which can lower the amount of blood moving through the blood vessels. If you have kidney, heart, or liver disease and have to limit fluids, talk with your doctor before you increase the amount of fluids you drink. · Try to stay calm when you are under stress. Anxiety can make your blood vessels constrict and lead to a Raynaud's attack. To keep your whole body warm  · Eat a hot meal and drink a warm liquid before going outside. They may help raise your body temperature. · Wear layers of warm clothing. The inner layer should be made of a material such as polypropylene that pulls moisture away from your body. · Wear a hat. · Do not wear clothing that is too tight. It can decrease or cut off blood flow. · Try to stay dry. Choose waterproof, breathable jackets and boots. Being wet makes you more likely to become chilled. When should you call for help?   Call your doctor now or seek immediate medical care if:    · You have severe pain in your hands or feet.     · Normal color does not return to your hands or feet.     · Your hands or feet do not warm up even after home care.    Watch closely for changes in your health, and be sure to contact your doctor if you have any problems. Where can you learn more? Go to http://zari-ivory.info/. Enter P043 in the search box to learn more about \"Raynaud's: Care Instructions. \"  Current as of: June 11, 2018  Content Version: 11.8  © 7389-1183 [employer]. Care instructions adapted under license by [facility] (which disclaims liability or warranty for this information). If you have questions about a medical condition or this instruction, always ask your healthcare professional. [address] any warranty or liability for your use of this information. Vaccine Information Statement     Pneumococcal Conjugate Vaccine (PCV13): What You Need to Know    Many Vaccine Information Statements are available in [place] and other languages. See www.immunize.org/vis. Hojas de información Sobre Vacunas están disponibles en español y en muchos otros idiomas. Visite www.immunize.org/vis. 1. Why get vaccinated? Vaccination can protect both children and adults from pneumococcal disease. Pneumococcal disease is caused by bacteria that can spread from person to person through close contact. It can cause ear infections, and it can also lead to more serious infections of the:   Lungs (pneumonia),   Blood (bacteremia), and   Covering of the brain and spinal cord (meningitis). Pneumococcal pneumonia is most common among adults. Pneumococcal meningitis can cause deafness and brain damage, and it kills about 1 child in 10 who get it.     Anyone can get pneumococcal disease, but children under 3years of age and adults 72 years and older, people with certain medical conditions, and cigarette smokers are at the highest risk.     Before there was a vaccine, the [facility] saw:   more than 700 cases of meningitis,   about 13,000 blood infections,   about 5 million ear infections, and   about 200 deaths  in children under 5 each year from pneumococcal disease. Since vaccine became available, severe pneumococcal disease in these children has fallen by 88%. About 18,000 older adults die of pneumococcal disease each year in the [place]. Treatment of pneumococcal infections with penicillin and other drugs is not as effective as it used to be, because some strains of the disease have become resistant to these drugs. This makes prevention of the disease, through vaccination, even more important. 2. PCV13 vaccine    Pneumococcal conjugate vaccine (called PCV13) protects against 13 types of pneumococcal bacteria. PCV13 is routinely given to children at 2, 4, 6, and 1515 months of age. It is also recommended for children and adults 3to 59years of age with certain health conditions, and for all adults 72years of age and older. Your doctor can give you details. 3. Some people should not get this vaccine    Anyone who has ever had a life-threatening allergic reaction to a dose of this vaccine, to an earlier pneumococcal vaccine called PCV7, or to any vaccine containing diphtheria toxoid (for example, DTaP), should not get PCV13. Anyone with a severe allergy to any component of PCV13 should not get the vaccine. Tell your doctor if the person being vaccinated has any severe allergies. If the person scheduled for vaccination is not feeling well, your healthcare provider might decide to reschedule the shot on another day. 4. Risks of a vaccine reaction    With any medicine, including vaccines, there is a chance of reactions. These are usually mild and go away on their own, but serious reactions are also possible. Problems reported following PCV13 varied by age and dose in the series.  The most common problems reported among children were:    About half became drowsy after the shot, had a temporary loss of appetite, or had redness or tenderness where the shot was given.  About 1 out of 3 had swelling where the shot was given.  About 1 out of 3 had a mild fever, and about 1 in 20 had a fever over 102.2°F.   Up to about 8 out of 10 became fussy or irritable. Adults have reported pain, redness, and swelling where the shot was given; also mild fever, fatigue, headache, chills, or muscle pain. [place] children who get PCV13 along with inactivated flu vaccine at the same time may be at increased risk for seizures caused by fever. Ask your doctor for more information. Problems that could happen after any vaccine:     People sometimes faint after a medical procedure, including vaccination. Sitting or lying down for about 15 minutes can help prevent fainting, and injuries caused by a fall. Tell your doctor if you feel dizzy, or have vision changes or ringing in the ears.  Some older children and adults get severe pain in the shoulder and have difficulty moving the arm where a shot was given. This happens very rarely.  Any medication can cause a severe allergic reaction. Such reactions from a vaccine are very rare, estimated at about 1 in a million doses, and would happen within a few minutes to a few hours after the vaccination. As with any medicine, there is a very small chance of a vaccine causing a serious injury or death. The safety of vaccines is always being monitored. For more information, visit: www.cdc.gov/vaccinesafety/     5. What if there is a serious reaction? What should I look for?  Look for anything that concerns you, such as signs of a severe allergic reaction, very high fever, or unusual behavior.     Signs of a severe allergic reaction can include hives, swelling of the face and throat, difficulty breathing, a fast heartbeat, dizziness, and weakness - usually within a few minutes to a few hours after the vaccination. What should I do?  If you think it is a severe allergic reaction or other emergency that cant wait, call 9-1-1 or get the person to the nearest hospital. Otherwise, call your doctor. Reactions should be reported to the Vaccine Adverse Event Reporting System (VAERS). Your doctor should file this report, or you can do it yourself through the VAERS web site at www.vaers. [facility].gov, or by calling 2-995.444.9519. VAERS does not give medical advice. 6. The National Vaccine Injury Compensation Program    The [facility] Vaccine Injury Compensation Program (VICP) is a federal program that was created to compensate people who may have been injured by certain vaccines. Persons who believe they may have been injured by a vaccine can learn about the program and about filing a claim by calling 8-826.371.5015 or visiting the [employer] website at www.[facility].gov/vaccinecompensation. There is a time limit to file a claim for compensation. 7. How can I learn more?  Ask your healthcare provider. He or she can give you the vaccine package insert or suggest other sources of information.  Call your local or state health department.  Contact the Centers for Disease Control and Prevention (CDC):  - Call 0-653.384.2388 (1-800-CDC-INFO) or  - Visit CDCs website at www.cdc.gov/vaccines    Vaccine Information Statement   PCV13 Vaccine   11/5/2015   [id number]    Department of Health and Human Services  Centers for Disease Control and Prevention    Office Use Only

## 2018-12-21 LAB
ALBUMIN SERPL-MCNC: 4.6 G/DL (ref 3.5–4.7)
ALBUMIN/GLOB SERPL: 1.6 {RATIO} (ref 1.2–2.2)
ALP SERPL-CCNC: 104 IU/L (ref 39–117)
ALT SERPL-CCNC: 12 IU/L (ref 0–32)
AST SERPL-CCNC: 22 IU/L (ref 0–40)
BASOPHILS # BLD AUTO: NORMAL 10*3/UL
BILIRUB SERPL-MCNC: 0.9 MG/DL (ref 0–1.2)
BUN SERPL-MCNC: 28 MG/DL (ref 8–27)
BUN/CREAT SERPL: 23 (ref 12–28)
CALCIUM SERPL-MCNC: 9.6 MG/DL (ref 8.7–10.3)
CHLORIDE SERPL-SCNC: 101 MMOL/L (ref 96–106)
CHOLEST SERPL-MCNC: 148 MG/DL (ref 100–199)
CO2 SERPL-SCNC: 20 MMOL/L (ref 20–29)
CREAT SERPL-MCNC: 1.24 MG/DL (ref 0.57–1)
EOSINOPHIL # BLD AUTO: NORMAL 10*3/UL
EOSINOPHIL NFR BLD AUTO: NORMAL %
GLOBULIN SER CALC-MCNC: 2.8 G/DL (ref 1.5–4.5)
GLUCOSE SERPL-MCNC: 84 MG/DL (ref 65–99)
HCT VFR BLD AUTO: NORMAL %
HDLC SERPL-MCNC: 80 MG/DL
HGB BLD-MCNC: NORMAL G/DL
LDLC SERPL CALC-MCNC: 53 MG/DL (ref 0–99)
LYMPHOCYTES # BLD AUTO: NORMAL 10*3/UL
LYMPHOCYTES NFR BLD AUTO: NORMAL %
MONOCYTES NFR BLD AUTO: NORMAL %
NEUTROPHILS NFR BLD AUTO: NORMAL %
PLATELET # BLD AUTO: NORMAL 10*3/UL
POTASSIUM SERPL-SCNC: 4.6 MMOL/L (ref 3.5–5.2)
PROT SERPL-MCNC: 7.4 G/DL (ref 6–8.5)
RBC # BLD AUTO: NORMAL 10*6/UL
SODIUM SERPL-SCNC: 141 MMOL/L (ref 134–144)
TRIGL SERPL-MCNC: 77 MG/DL (ref 0–149)
VLDLC SERPL CALC-MCNC: 15 MG/DL (ref 5–40)
WBC # BLD AUTO: NORMAL X10E3/UL

## 2019-01-11 DIAGNOSIS — I10 ESSENTIAL HYPERTENSION: ICD-10-CM

## 2019-01-13 DIAGNOSIS — K21.9 GASTROESOPHAGEAL REFLUX DISEASE WITHOUT ESOPHAGITIS: ICD-10-CM

## 2019-01-13 RX ORDER — NEBIVOLOL HYDROCHLORIDE 10 MG/1
TABLET ORAL
Qty: 90 TAB | Refills: 1 | Status: SHIPPED | OUTPATIENT
Start: 2019-01-13 | End: 2019-07-12 | Stop reason: SDUPTHER

## 2019-01-13 RX ORDER — OMEPRAZOLE 20 MG/1
CAPSULE, DELAYED RELEASE ORAL
Qty: 90 CAP | Refills: 3 | Status: SHIPPED | OUTPATIENT
Start: 2019-01-13 | End: 2020-01-08

## 2019-02-24 RX ORDER — AMLODIPINE BESYLATE 5 MG/1
TABLET ORAL
Qty: 90 TAB | Refills: 1 | Status: SHIPPED | OUTPATIENT
Start: 2019-02-24 | End: 2019-03-21 | Stop reason: SDUPTHER

## 2019-03-21 ENCOUNTER — OFFICE VISIT (OUTPATIENT)
Dept: INTERNAL MEDICINE CLINIC | Facility: CLINIC | Age: 84
End: 2019-03-21

## 2019-03-21 VITALS
BODY MASS INDEX: 27.31 KG/M2 | WEIGHT: 121.4 LBS | HEIGHT: 56 IN | DIASTOLIC BLOOD PRESSURE: 90 MMHG | OXYGEN SATURATION: 96 % | HEART RATE: 56 BPM | RESPIRATION RATE: 16 BRPM | SYSTOLIC BLOOD PRESSURE: 188 MMHG | TEMPERATURE: 97.9 F

## 2019-03-21 DIAGNOSIS — I10 ESSENTIAL HYPERTENSION: Primary | ICD-10-CM

## 2019-03-21 DIAGNOSIS — F43.21 GRIEF REACTION: ICD-10-CM

## 2019-03-21 RX ORDER — AMLODIPINE BESYLATE 5 MG/1
TABLET ORAL
Qty: 90 TAB | Refills: 1 | Status: SHIPPED | OUTPATIENT
Start: 2019-03-21 | End: 2019-11-01 | Stop reason: SDUPTHER

## 2019-03-21 NOTE — PROGRESS NOTES
Sowmya Smith    Chief Complaint   Patient presents with    Blood Pressure Check     Room 2B//            Reviewed record In preparation for visit and have obtained necessary documentation    1. Have you been to the ER, urgent care clinic since your last visit? Hospitalized since your last visit? NO  2. Have you seen or consulted any other health care providers outside of the [address] [place] [address] since your last visit? Include any pap smears or colon screening. NO    Patient has advance directive on file    Provider advised of reason for visit and vitals    There are no preventive care reminders to display for this patient. Wt Readings from Last 3 Encounters:   03/21/19 121 lb 6.4 oz (55.1 kg)   12/20/18 118 lb 3.2 oz (53.6 kg)   09/21/18 120 lb (54.4 kg)     Temp Readings from Last 3 Encounters:   12/20/18 97.7 °F (36.5 °C) (Oral)   09/21/18 97.9 °F (36.6 °C) (Oral)   08/21/18 97.7 °F (36.5 °C) (Oral)     BP Readings from Last 3 Encounters:   12/20/18 164/70   09/21/18 130/80   08/21/18 152/84     Pulse Readings from Last 3 Encounters:   12/20/18 (!) 58   09/21/18 (!) 57   08/21/18 64         Learning Assessment:  :     No flowsheet data found. Depression Screening:  :     3 most recent PHQ Screens 3/21/2019   Little interest or pleasure in doing things Not at all   Feeling down, depressed, irritable, or hopeless Not at all   Total Score PHQ 2 0       Fall Risk Assessment:  :     Fall Risk Assessment, last 12 mths 2/15/2018   Able to walk? Yes   Fall in past 12 months? No       Abuse Screening:  :     Abuse Screening Questionnaire 2/15/2018   Do you ever feel afraid of your partner? N   Are you in a relationship with someone who physically or mentally threatens you? N   Is it safe for you to go home?  Y       ADL Screening:  :     ADL Assessment 2/15/2018   Feeding yourself No Help Needed   Getting from bed to chair No Help Needed   Getting dressed No Help Needed   Bathing or showering No Help Needed Walk across the room (includes cane/walker) No Help Needed   Using the telphone No Help Needed   Taking your medications No Help Needed   Preparing meals No Help Needed   Managing money (expenses/bills) No Help Needed   Moderately strenuous housework (laundry) No Help Needed   Shopping for personal items (toiletries/medicines) No Help Needed   Shopping for groceries Help Needed   Driving Help Needed   Climbing a flight of stairs No Help Needed   Getting to places beyond walking distances Help Needed           Medication reconciliation up to date and corrected with patient at this time.

## 2019-03-21 NOTE — PROGRESS NOTES
HPI  Ms. Diana Lentz is a 80y.o. year old female, she is seen today for follow up HTN. BP lo-206/57-62    Last visit about 3 mos ago I increased losartan to 50mg bid as bp was not controlled. Since last visit  , was in hospice. Says has been more stressed. No chest pain, no worsening sob. Hasn't noticed change in vision other than a little more   Daughter notes that sob is more noticeable, occasionally wheezing. Not much cough, may cough when allergies worse. Not sleeping well. Says she has a feeling of \"waiting for something\" but doesn't know what it is. Not a bad feeling. Appetite is good. Not sad, down or depressed. Some grieving, progressing. Chief Complaint   Patient presents with    Blood Pressure Check     Room 2B//         Prior to Admission medications    Medication Sig Start Date End Date Taking? Authorizing Provider   amLODIPine (NORVASC) 5 mg tablet TAKE 1 TABLET DAILY 3/21/19  Yes Andrea Cotton MD   BYSTOLIC 10 mg tablet TAKE 1 TABLET DAILY 19  Yes Andrea Cotton MD   omeprazole (PRILOSEC) 20 mg capsule TAKE 1 CAPSULE DAILY 19  Yes Andrea Cotton MD   atorvastatin (LIPITOR) 20 mg tablet TAKE 1 TABLET DAILY 18  Yes Andrea Cotton MD   aspirin delayed-release 325 mg tablet Take 1 Tab by mouth daily. 18  Yes Andrea Cotton MD   losartan (COZAAR) 50 mg tablet Take 1 Tab by mouth two (2) times a day. 18  Yes Andrea Ctoton MD   furosemide (LASIX) 20 mg tablet Take 1 Tab by mouth daily. Indications: Edema 2/15/18  Yes Andrea Cotton MD   celecoxib (CELEBREX) 200 mg capsule Take 200 mg by mouth daily. Yes Provider, Historical   cholecalciferol, vitamin D3, (VITAMIN D3) 2,000 unit tab Take  by mouth daily.     Provider, Historical         No Known Allergies      REVIEW OF SYSTEMS:  Per HPI    PHYSICAL EXAM:  Visit Vitals  /90   Pulse (!) 56   Temp 97.9 °F (36.6 °C) (Oral)   Resp 16   Ht 4' 8\" (1.422 m) Wt 121 lb 6.4 oz (55.1 kg)   SpO2 96%   BMI 27.22 kg/m²     Constitutional: Appears well-developed and well-nourished. No distress. HENT:   Head: Normocephalic and atraumatic. Eyes: No scleral icterus. Neck: no lad, no tm, supple   Cardiovascular: Normal S1/S2, regular rhythm. No murmurs, rubs, or gallops. Pulmonary/Chest: Effort normal and breath sounds normal. No respiratory distress. No wheezes, rhonchi, or rales. Ext: No edema. Neurological: Alert. Psychiatric: Normal mood and affect. Behavior is normal.     Lab Results   Component Value Date/Time    Sodium 141 12/20/2018 12:05 PM    Potassium 4.6 12/20/2018 12:05 PM    Chloride 101 12/20/2018 12:05 PM    CO2 20 12/20/2018 12:05 PM    Anion gap 9 03/26/2014 05:00 AM    Glucose 84 12/20/2018 12:05 PM    BUN 28 (H) 12/20/2018 12:05 PM    Creatinine 1.24 (H) 12/20/2018 12:05 PM    BUN/Creatinine ratio 23 12/20/2018 12:05 PM    GFR est AA 45 (L) 12/20/2018 12:05 PM    GFR est non-AA 39 (L) 12/20/2018 12:05 PM    Calcium 9.6 12/20/2018 12:05 PM    Bilirubin, total 0.9 12/20/2018 12:05 PM    AST (SGOT) 22 12/20/2018 12:05 PM    Alk. phosphatase 104 12/20/2018 12:05 PM    Protein, total 7.4 12/20/2018 12:05 PM    Albumin 4.6 12/20/2018 12:05 PM    Globulin 3.3 03/12/2013 03:45 AM    A-G Ratio 1.6 12/20/2018 12:05 PM    ALT (SGPT) 12 12/20/2018 12:05 PM     Lab Results   Component Value Date/Time    Hemoglobin A1c 5.1 03/07/2014 01:35 PM    Hemoglobin A1c 5.4 02/25/2013 02:09 PM      Lab Results   Component Value Date/Time    Cholesterol, total 148 12/20/2018 12:05 PM    HDL Cholesterol 80 12/20/2018 12:05 PM    LDL, calculated 53 12/20/2018 12:05 PM    VLDL, calculated 15 12/20/2018 12:05 PM    Triglyceride 77 12/20/2018 12:05 PM          ASSESSMENT/PLAN  Diagnoses and all orders for this visit:    1. Essential hypertension  -     METABOLIC PANEL, BASIC  -     amLODIPine (NORVASC) 5 mg tablet; TAKE 1 TABLET DAILY  Not controlled - add back above  2. Grief reaction  Progressing appropriately    Follow-up and Dispositions    · Return in about 6 weeks (around 5/2/2019) for bp. There are no preventive care reminders to display for this patient. Reviewed plan of care. Patient has provided input and agrees with goals. The nurse provided the patient and/or family with advanced directive information if needed and encouraged the patient to provide a copy to the office when available.

## 2019-03-22 LAB
BUN SERPL-MCNC: 24 MG/DL (ref 8–27)
BUN/CREAT SERPL: 23 (ref 12–28)
CALCIUM SERPL-MCNC: 9.6 MG/DL (ref 8.7–10.3)
CHLORIDE SERPL-SCNC: 104 MMOL/L (ref 96–106)
CO2 SERPL-SCNC: 27 MMOL/L (ref 20–29)
CREAT SERPL-MCNC: 1.06 MG/DL (ref 0.57–1)
GLUCOSE SERPL-MCNC: 83 MG/DL (ref 65–99)
POTASSIUM SERPL-SCNC: 4.6 MMOL/L (ref 3.5–5.2)
SODIUM SERPL-SCNC: 145 MMOL/L (ref 134–144)

## 2019-05-13 DIAGNOSIS — I10 ESSENTIAL HYPERTENSION: ICD-10-CM

## 2019-05-13 RX ORDER — FUROSEMIDE 20 MG/1
TABLET ORAL
Qty: 90 TAB | Refills: 4 | Status: SHIPPED | OUTPATIENT
Start: 2019-05-13 | End: 2020-08-05

## 2019-05-16 ENCOUNTER — OFFICE VISIT (OUTPATIENT)
Dept: INTERNAL MEDICINE CLINIC | Facility: CLINIC | Age: 84
End: 2019-05-16

## 2019-05-16 VITALS
OXYGEN SATURATION: 97 % | TEMPERATURE: 97.7 F | RESPIRATION RATE: 16 BRPM | HEART RATE: 63 BPM | WEIGHT: 124.8 LBS | HEIGHT: 56 IN | DIASTOLIC BLOOD PRESSURE: 84 MMHG | SYSTOLIC BLOOD PRESSURE: 168 MMHG | BODY MASS INDEX: 28.07 KG/M2

## 2019-05-16 DIAGNOSIS — R06.02 SOB (SHORTNESS OF BREATH) ON EXERTION: ICD-10-CM

## 2019-05-16 DIAGNOSIS — I10 ESSENTIAL HYPERTENSION: Primary | ICD-10-CM

## 2019-05-16 DIAGNOSIS — M48.061 SPINAL STENOSIS OF LUMBAR REGION WITHOUT NEUROGENIC CLAUDICATION: ICD-10-CM

## 2019-05-16 RX ORDER — CHLORTHALIDONE 25 MG/1
12.5 TABLET ORAL DAILY
Qty: 45 TAB | Refills: 1 | Status: SHIPPED | OUTPATIENT
Start: 2019-05-16 | End: 2019-10-25 | Stop reason: SDUPTHER

## 2019-05-16 RX ORDER — TRAMADOL HYDROCHLORIDE 50 MG/1
50 TABLET ORAL
Qty: 28 TAB | Refills: 0 | Status: SHIPPED | OUTPATIENT
Start: 2019-05-16 | End: 2019-05-23

## 2019-05-16 NOTE — PROGRESS NOTES
HPI  Ms. Lyndon Mcconnell is a 80y.o. year old female, she is seen today for follow up HTN. Plan last visit to add back amlodipine. Daughter notes she wheezes walking up the stairs, shortness of breath. No problems with walking flat surfaces. No PND or orthopnea. Occasional cough with allergies, post nasal drip. No chest pain. No change in mild edema right leg since surgery. Has HA \"a lot\" about 60 years - maybe worse in the last 10 years. Tylenol helps. She also notes when she takes it for pain in back or head her BP will go down. Range 147-192/44-65    Eating better, gained a little weight. Has chronic back pain especially lumbar and thoracic regions, s/p multiple surgeries. Pain can be severe at times, causing elevated BP but would only take tylenol. Has resisted prescription pain medications in past b/c worried about becoming addicted. Chief Complaint   Patient presents with    Blood Pressure Check     Room 2A// fasting         Prior to Admission medications    Medication Sig Start Date End Date Taking? Authorizing Provider   chlorthalidone (HYGROTEN) 25 mg tablet Take 0.5 Tabs by mouth daily. 5/16/19  Yes Ashley Heard MD   traMADol Tildon Musca) 50 mg tablet Take 1 Tab by mouth every six (6) hours as needed for Pain for up to 7 days. Max Daily Amount: 200 mg. Indications: Pain 5/16/19 5/23/19 Yes Ashley Heard MD   furosemide (LASIX) 20 mg tablet TAKE 1 TABLET DAILY FOR EDEMA 5/13/19  Yes Ashley Heard MD   amLODIPine (NORVASC) 5 mg tablet TAKE 1 TABLET DAILY 3/21/19  Yes Ashley Heard MD   BYSTOLIC 10 mg tablet TAKE 1 TABLET DAILY 1/13/19  Yes Ashley Heard MD   omeprazole (PRILOSEC) 20 mg capsule TAKE 1 CAPSULE DAILY 1/13/19  Yes Ashley Heard MD   atorvastatin (LIPITOR) 20 mg tablet TAKE 1 TABLET DAILY 11/19/18  Yes Ashley Heard MD   aspirin delayed-release 325 mg tablet Take 1 Tab by mouth daily.  8/27/18  Yes Ashley Heard MD   losartan (COZAAR) 50 mg tablet Take 1 Tab by mouth two (2) times a day. 7/30/18  Yes Arabella Bridges MD   celecoxib (CELEBREX) 200 mg capsule Take 200 mg by mouth daily. Yes Provider, Historical   cholecalciferol, vitamin D3, (VITAMIN D3) 2,000 unit tab Take  by mouth daily. Provider, Historical         No Known Allergies      REVIEW OF SYSTEMS:  Per HPI    PHYSICAL EXAM:  Visit Vitals  /84   Pulse 63   Temp 97.7 °F (36.5 °C) (Oral)   Resp 16   Ht 4' 8\" (1.422 m)   Wt 124 lb 12.8 oz (56.6 kg)   SpO2 97%   BMI 27.98 kg/m²     Constitutional: Appears well-developed and well-nourished. No distress. HENT:   Head: Normocephalic and atraumatic. Eyes: No scleral icterus. Neck: no lad, no tm, supple   Cardiovascular: Normal S1/S2, regular rhythm. No murmurs, rubs, or gallops. Pulmonary/Chest: Effort normal and breath sounds normal. No respiratory distress. No wheezes, rhonchi, or rales. Ext: No edema rle, trace lle   Neurological: Alert. Psychiatric: Normal mood and affect. Behavior is normal.     Lab Results   Component Value Date/Time    Sodium 145 (H) 03/21/2019 04:09 PM    Potassium 4.6 03/21/2019 04:09 PM    Chloride 104 03/21/2019 04:09 PM    CO2 27 03/21/2019 04:09 PM    Anion gap 9 03/26/2014 05:00 AM    Glucose 83 03/21/2019 04:09 PM    BUN 24 03/21/2019 04:09 PM    Creatinine 1.06 (H) 03/21/2019 04:09 PM    BUN/Creatinine ratio 23 03/21/2019 04:09 PM    GFR est AA 54 (L) 03/21/2019 04:09 PM    GFR est non-AA 47 (L) 03/21/2019 04:09 PM    Calcium 9.6 03/21/2019 04:09 PM    Bilirubin, total 0.9 12/20/2018 12:05 PM    AST (SGOT) 22 12/20/2018 12:05 PM    Alk.  phosphatase 104 12/20/2018 12:05 PM    Protein, total 7.4 12/20/2018 12:05 PM    Albumin 4.6 12/20/2018 12:05 PM    Globulin 3.3 03/12/2013 03:45 AM    A-G Ratio 1.6 12/20/2018 12:05 PM    ALT (SGPT) 12 12/20/2018 12:05 PM     Lab Results   Component Value Date/Time    Hemoglobin A1c 5.1 03/07/2014 01:35 PM    Hemoglobin A1c 5.4 02/25/2013 02:09 PM Lab Results   Component Value Date/Time    Cholesterol, total 148 12/20/2018 12:05 PM    HDL Cholesterol 80 12/20/2018 12:05 PM    LDL, calculated 53 12/20/2018 12:05 PM    VLDL, calculated 15 12/20/2018 12:05 PM    Triglyceride 77 12/20/2018 12:05 PM          ASSESSMENT/PLAN  Diagnoses and all orders for this visit:    1. Essential hypertension  -     METABOLIC PANEL, BASIC  -     chlorthalidone (HYGROTEN) 25 mg tablet; Take 0.5 Tabs by mouth daily.  -     ECHO ADULT COMPLETE; Future    2. SOB (shortness of breath) on exertion  -     CBC WITH AUTOMATED DIFF  -     TSH RFX ON ABNORMAL TO FREE T4  -     ECHO ADULT COMPLETE; Future    3. Spinal stenosis of lumbar region without neurogenic claudication  -     traMADol (ULTRAM) 50 mg tablet; Take 1 Tab by mouth every six (6) hours as needed for Pain for up to 7 days. Max Daily Amount: 200 mg. Indications: Pain    Patient resistant to pain medications but will try above for severe pain. Understands may cause sedation. I suspect chronic pain may be contributing to HTN. Will also add chlorthalidone, get bmp next visit. R/o valvular disorder, low EF with echo. shortness of breath could be from uncontrolled HTN. There are no preventive care reminders to display for this patient. Follow-up and Dispositions    · Return for 4-6 weeks HTN. Reviewed plan of care. Patient has provided input and agrees with goals. The nurse provided the patient and/or family with advanced directive information if needed and encouraged the patient to provide a copy to the office when available.

## 2019-05-16 NOTE — PROGRESS NOTES
Jessie Hartley    Chief Complaint   Patient presents with    Blood Pressure Check     Room            Reviewed record In preparation for visit and have obtained necessary documentation    1. Have you been to the ER, urgent care clinic since your last visit? Hospitalized since your last visit? NO  2. Have you seen or consulted any other health care providers outside of the [address] [place] [address] since your last visit? Include any pap smears or colon screening. NO    Patient has advance directive on file    Provider advised of reason for visit and vitals    There are no preventive care reminders to display for this patient. Wt Readings from Last 3 Encounters:   03/21/19 121 lb 6.4 oz (55.1 kg)   12/20/18 118 lb 3.2 oz (53.6 kg)   09/21/18 120 lb (54.4 kg)     Temp Readings from Last 3 Encounters:   03/21/19 97.9 °F (36.6 °C) (Oral)   12/20/18 97.7 °F (36.5 °C) (Oral)   09/21/18 97.9 °F (36.6 °C) (Oral)     BP Readings from Last 3 Encounters:   03/21/19 188/90   12/20/18 164/70   09/21/18 130/80     Pulse Readings from Last 3 Encounters:   03/21/19 (!) 56   12/20/18 (!) 58   09/21/18 (!) 57         Learning Assessment:  :     No flowsheet data found. Depression Screening:  :     3 most recent PHQ Screens 5/16/2019   Little interest or pleasure in doing things Not at all   Feeling down, depressed, irritable, or hopeless Not at all   Total Score PHQ 2 0       Fall Risk Assessment:  :     Fall Risk Assessment, last 12 mths 5/16/2019   Able to walk? Yes   Fall in past 12 months? No       Abuse Screening:  :     Abuse Screening Questionnaire 2/15/2018   Do you ever feel afraid of your partner? N   Are you in a relationship with someone who physically or mentally threatens you? N   Is it safe for you to go home?  Y       ADL Screening:  :     ADL Assessment 2/15/2018   Feeding yourself No Help Needed   Getting from bed to chair No Help Needed   Getting dressed No Help Needed   Bathing or showering No Help Needed Walk across the room (includes cane/walker) No Help Needed   Using the telphone No Help Needed   Taking your medications No Help Needed   Preparing meals No Help Needed   Managing money (expenses/bills) No Help Needed   Moderately strenuous housework (laundry) No Help Needed   Shopping for personal items (toiletries/medicines) No Help Needed   Shopping for groceries Help Needed   Driving Help Needed   Climbing a flight of stairs No Help Needed   Getting to places beyond walking distances Help Needed           Medication reconciliation up to date and corrected with patient at this time.

## 2019-05-17 LAB
BASOPHILS # BLD AUTO: 0 X10E3/UL (ref 0–0.2)
BASOPHILS NFR BLD AUTO: 1 %
BUN SERPL-MCNC: 23 MG/DL (ref 8–27)
BUN/CREAT SERPL: 24 (ref 12–28)
CALCIUM SERPL-MCNC: 9.6 MG/DL (ref 8.7–10.3)
CHLORIDE SERPL-SCNC: 101 MMOL/L (ref 96–106)
CO2 SERPL-SCNC: 28 MMOL/L (ref 20–29)
CREAT SERPL-MCNC: 0.95 MG/DL (ref 0.57–1)
EOSINOPHIL # BLD AUTO: 0.3 X10E3/UL (ref 0–0.4)
EOSINOPHIL NFR BLD AUTO: 4 %
ERYTHROCYTE [DISTWIDTH] IN BLOOD BY AUTOMATED COUNT: 13.7 % (ref 12.3–15.4)
GLUCOSE SERPL-MCNC: 85 MG/DL (ref 65–99)
HCT VFR BLD AUTO: 36.4 % (ref 34–46.6)
HGB BLD-MCNC: 11.6 G/DL (ref 11.1–15.9)
IMM GRANULOCYTES # BLD AUTO: 0 X10E3/UL (ref 0–0.1)
IMM GRANULOCYTES NFR BLD AUTO: 0 %
LYMPHOCYTES # BLD AUTO: 1.4 X10E3/UL (ref 0.7–3.1)
LYMPHOCYTES NFR BLD AUTO: 23 %
MCH RBC QN AUTO: 29.9 PG (ref 26.6–33)
MCHC RBC AUTO-ENTMCNC: 31.9 G/DL (ref 31.5–35.7)
MCV RBC AUTO: 94 FL (ref 79–97)
MONOCYTES # BLD AUTO: 0.6 X10E3/UL (ref 0.1–0.9)
MONOCYTES NFR BLD AUTO: 9 %
NEUTROPHILS # BLD AUTO: 4 X10E3/UL (ref 1.4–7)
NEUTROPHILS NFR BLD AUTO: 63 %
PLATELET # BLD AUTO: 218 X10E3/UL (ref 150–379)
POTASSIUM SERPL-SCNC: 5 MMOL/L (ref 3.5–5.2)
RBC # BLD AUTO: 3.88 X10E6/UL (ref 3.77–5.28)
SODIUM SERPL-SCNC: 141 MMOL/L (ref 134–144)
TSH SERPL DL<=0.005 MIU/L-ACNC: 2.64 UIU/ML (ref 0.45–4.5)
WBC # BLD AUTO: 6.3 X10E3/UL (ref 3.4–10.8)

## 2019-05-31 ENCOUNTER — HOSPITAL ENCOUNTER (OUTPATIENT)
Dept: NON INVASIVE DIAGNOSTICS | Age: 84
Discharge: HOME | End: 2019-05-31
Attending: INTERNAL MEDICINE
Payer: MEDICARE

## 2019-05-31 DIAGNOSIS — I10 ESSENTIAL HYPERTENSION: ICD-10-CM

## 2019-05-31 DIAGNOSIS — R06.02 SOB (SHORTNESS OF BREATH) ON EXERTION: ICD-10-CM

## 2019-05-31 LAB
ECHO AV MEAN GRADIENT: 1.9 MMHG
ECHO AV PEAK GRADIENT: 4.6 MMHG
ECHO AV PEAK VELOCITY: 107.64 CM/S
ECHO AV REGURGITANT PHT: 340.1 CM
ECHO AV VTI: 34.87 CM
ECHO LV EDV A4C: 67 ML
ECHO LV EJECTION FRACTION A4C: 64 %
ECHO LV ESV A4C: 23.9 ML
ECHO LV INTERNAL DIMENSION DIASTOLIC: 3.65 CM (ref 3.9–5.3)
ECHO LV INTERNAL DIMENSION SYSTOLIC: 2.27 CM
ECHO LV IVSD: 0.69 CM (ref 0.6–0.9)
ECHO LV MASS 2D: 115.1 G (ref 67–162)
ECHO LV POSTERIOR WALL DIASTOLIC: 1.23 CM (ref 0.6–0.9)
ECHO LVOT PEAK GRADIENT: 3.4 MMHG
ECHO LVOT PEAK VELOCITY: 92.03 CM/S
ECHO LVOT VTI: 17.91 CM
PISA AR MAX VEL: 231.05 CM/S

## 2019-05-31 PROCEDURE — 93306 TTE W/DOPPLER COMPLETE: CPT

## 2019-06-27 ENCOUNTER — OFFICE VISIT (OUTPATIENT)
Dept: INTERNAL MEDICINE CLINIC | Facility: CLINIC | Age: 84
End: 2019-06-27

## 2019-06-27 VITALS
HEIGHT: 56 IN | WEIGHT: 124.6 LBS | OXYGEN SATURATION: 96 % | BODY MASS INDEX: 28.03 KG/M2 | DIASTOLIC BLOOD PRESSURE: 72 MMHG | HEART RATE: 71 BPM | TEMPERATURE: 97.9 F | SYSTOLIC BLOOD PRESSURE: 120 MMHG | RESPIRATION RATE: 14 BRPM

## 2019-06-27 DIAGNOSIS — I10 ESSENTIAL HYPERTENSION: Primary | ICD-10-CM

## 2019-06-27 NOTE — PROGRESS NOTES
HPI  Ms. Christina Botello is a 80y.o. year old female, she is seen today for follow up HTN. I added chlorthalidone 12.5mg daily. Took tramadol and BP was lower when pain was controlled. bp log reveals 145-182/49-72  Had echo since last visit - significant for EF 51-55%, mild aortic regurgitation. Admits to sob with walking. No chest pain, back pain was better after taking tramadol a few times - twice. Chief Complaint   Patient presents with    Hypertension     Room 2B// Echo done 5/31        Prior to Admission medications    Medication Sig Start Date End Date Taking? Authorizing Provider   chlorthalidone (HYGROTEN) 25 mg tablet Take 0.5 Tabs by mouth daily. 5/16/19  Yes Alf Adams MD   furosemide (LASIX) 20 mg tablet TAKE 1 TABLET DAILY FOR EDEMA 5/13/19  Yes Alf Adams MD   amLODIPine (NORVASC) 5 mg tablet TAKE 1 TABLET DAILY 3/21/19  Yes Alf Adams MD   BYSTOLIC 10 mg tablet TAKE 1 TABLET DAILY 1/13/19  Yes Alf Adams MD   omeprazole (PRILOSEC) 20 mg capsule TAKE 1 CAPSULE DAILY 1/13/19  Yes Alf Adams MD   atorvastatin (LIPITOR) 20 mg tablet TAKE 1 TABLET DAILY 11/19/18  Yes Alf Adams MD   aspirin delayed-release 325 mg tablet Take 1 Tab by mouth daily. 8/27/18  Yes Alf Adams MD   losartan (COZAAR) 50 mg tablet Take 1 Tab by mouth two (2) times a day. 7/30/18  Yes Alf Adams MD   cholecalciferol, vitamin D3, (VITAMIN D3) 2,000 unit tab Take  by mouth daily. Yes Provider, Historical   celecoxib (CELEBREX) 200 mg capsule Take 200 mg by mouth daily. Yes Provider, Historical         No Known Allergies      REVIEW OF SYSTEMS:  Per HPI    PHYSICAL EXAM:  Visit Vitals  /54 (BP 1 Location: Left arm, BP Patient Position: Sitting)   Pulse 71   Temp 97.9 °F (36.6 °C) (Oral)   Resp 14   Ht 4' 8\" (1.422 m)   Wt 124 lb 9.6 oz (56.5 kg)   SpO2 96%   BMI 27.93 kg/m²     Constitutional: Appears well-developed and well-nourished.  No distress. HENT:   Head: Normocephalic and atraumatic. Eyes: No scleral icterus. Cardiovascular: Normal S1/S2, regular rhythm. No murmurs, rubs, or gallops. Pulmonary/Chest: Effort normal and breath sounds normal. No respiratory distress. No wheezes, rhonchi, or rales. Ext: No edema rle, trace lle. Neurological: Alert. Psychiatric: Normal mood and affect. Behavior is normal.     Lab Results   Component Value Date/Time    Sodium 141 05/16/2019 01:47 PM    Potassium 5.0 05/16/2019 01:47 PM    Chloride 101 05/16/2019 01:47 PM    CO2 28 05/16/2019 01:47 PM    Anion gap 9 03/26/2014 05:00 AM    Glucose 85 05/16/2019 01:47 PM    BUN 23 05/16/2019 01:47 PM    Creatinine 0.95 05/16/2019 01:47 PM    BUN/Creatinine ratio 24 05/16/2019 01:47 PM    GFR est AA 62 05/16/2019 01:47 PM    GFR est non-AA 54 (L) 05/16/2019 01:47 PM    Calcium 9.6 05/16/2019 01:47 PM    Bilirubin, total 0.9 12/20/2018 12:05 PM    AST (SGOT) 22 12/20/2018 12:05 PM    Alk. phosphatase 104 12/20/2018 12:05 PM    Protein, total 7.4 12/20/2018 12:05 PM    Albumin 4.6 12/20/2018 12:05 PM    Globulin 3.3 03/12/2013 03:45 AM    A-G Ratio 1.6 12/20/2018 12:05 PM    ALT (SGPT) 12 12/20/2018 12:05 PM     Lab Results   Component Value Date/Time    Hemoglobin A1c 5.1 03/07/2014 01:35 PM    Hemoglobin A1c 5.4 02/25/2013 02:09 PM      Lab Results   Component Value Date/Time    Cholesterol, total 148 12/20/2018 12:05 PM    HDL Cholesterol 80 12/20/2018 12:05 PM    LDL, calculated 53 12/20/2018 12:05 PM    VLDL, calculated 15 12/20/2018 12:05 PM    Triglyceride 77 12/20/2018 12:05 PM          ASSESSMENT/PLAN  Diagnoses and all orders for this visit:    1. Essential hypertension  -     METABOLIC PANEL, BASIC       BP improved - check labs, continue current medications    Health Maintenance Due   Topic Date Due    MEDICARE YEARLY EXAM  07/13/2019               Reviewed plan of care. Patient has provided input and agrees with goals.      The nurse provided the patient and/or family with advanced directive information if needed and encouraged the patient to provide a copy to the office when available.

## 2019-06-27 NOTE — PROGRESS NOTES
Clement Hardy    Chief Complaint   Patient presents with    Hypertension     Room 2B//            Reviewed record In preparation for visit and have obtained necessary documentation    1. Have you been to the ER, urgent care clinic since your last visit? Hospitalized since your last visit? NO  2. Have you seen or consulted any other health care providers outside of the [address] [place] [address] since your last visit? Include any pap smears or colon screening. NO    Patient has advance directive on file    Provider advised of reason for visit and vitals    Health Maintenance Due   Topic    MEDICARE YEARLY EXAM        Wt Readings from Last 3 Encounters:   05/16/19 124 lb 12.8 oz (56.6 kg)   03/21/19 121 lb 6.4 oz (55.1 kg)   12/20/18 118 lb 3.2 oz (53.6 kg)     Temp Readings from Last 3 Encounters:   05/16/19 97.7 °F (36.5 °C) (Oral)   03/21/19 97.9 °F (36.6 °C) (Oral)   12/20/18 97.7 °F (36.5 °C) (Oral)     BP Readings from Last 3 Encounters:   05/16/19 168/84   03/21/19 188/90   12/20/18 164/70     Pulse Readings from Last 3 Encounters:   05/16/19 63   03/21/19 (!) 56   12/20/18 (!) 58         Learning Assessment:  :     No flowsheet data found. Depression Screening:  :     3 most recent PHQ Screens 6/27/2019   Little interest or pleasure in doing things Not at all   Feeling down, depressed, irritable, or hopeless Not at all   Total Score PHQ 2 0       Fall Risk Assessment:  :     Fall Risk Assessment, last 12 mths 6/27/2019   Able to walk? Yes   Fall in past 12 months? No       Abuse Screening:  :     Abuse Screening Questionnaire 2/15/2018   Do you ever feel afraid of your partner? N   Are you in a relationship with someone who physically or mentally threatens you? N   Is it safe for you to go home?  Y       ADL Screening:  :     ADL Assessment 2/15/2018   Feeding yourself No Help Needed   Getting from bed to chair No Help Needed   Getting dressed No Help Needed   Bathing or showering No Help Needed   Walk across the room (includes cane/walker) No Help Needed   Using the telphone No Help Needed   Taking your medications No Help Needed   Preparing meals No Help Needed   Managing money (expenses/bills) No Help Needed   Moderately strenuous housework (laundry) No Help Needed   Shopping for personal items (toiletries/medicines) No Help Needed   Shopping for groceries Help Needed   Driving Help Needed   Climbing a flight of stairs No Help Needed   Getting to places beyond walking distances Help Needed           Medication reconciliation up to date and corrected with patient at this time.

## 2019-06-28 LAB
BUN SERPL-MCNC: 32 MG/DL (ref 8–27)
BUN/CREAT SERPL: 23 (ref 12–28)
CALCIUM SERPL-MCNC: 9.6 MG/DL (ref 8.7–10.3)
CHLORIDE SERPL-SCNC: 100 MMOL/L (ref 96–106)
CO2 SERPL-SCNC: 27 MMOL/L (ref 20–29)
CREAT SERPL-MCNC: 1.39 MG/DL (ref 0.57–1)
GLUCOSE SERPL-MCNC: 131 MG/DL (ref 65–99)
POTASSIUM SERPL-SCNC: 4.2 MMOL/L (ref 3.5–5.2)
SODIUM SERPL-SCNC: 141 MMOL/L (ref 134–144)

## 2019-06-28 NOTE — PROGRESS NOTES
Tell her chlorthalidone is causing some dehydration so I want her to decrease to 12.5mg every other day and recheck bmp in 1-2 weeks

## 2019-07-05 ENCOUNTER — TELEPHONE (OUTPATIENT)
Dept: INTERNAL MEDICINE CLINIC | Facility: CLINIC | Age: 84
End: 2019-07-05

## 2019-07-05 NOTE — TELEPHONE ENCOUNTER
----- Message from Mj Araiza sent at 7/3/2019  1:12 PM EDT -----  Regarding: Dr. Sánchez Lowers: [phone]  Pt requesting a return call after a missed call from Milad at the practice.

## 2019-07-08 RX ORDER — LOSARTAN POTASSIUM 50 MG/1
TABLET ORAL
Qty: 180 TAB | Refills: 3 | Status: SHIPPED | OUTPATIENT
Start: 2019-07-08 | End: 2020-07-02

## 2019-07-12 DIAGNOSIS — I10 ESSENTIAL HYPERTENSION: ICD-10-CM

## 2019-07-12 RX ORDER — NEBIVOLOL HYDROCHLORIDE 10 MG/1
TABLET ORAL
Qty: 90 TAB | Refills: 1 | Status: SHIPPED | OUTPATIENT
Start: 2019-07-12 | End: 2020-01-08

## 2019-07-19 ENCOUNTER — LAB ONLY (OUTPATIENT)
Dept: INTERNAL MEDICINE CLINIC | Facility: CLINIC | Age: 84
End: 2019-07-19

## 2019-07-19 DIAGNOSIS — I10 ESSENTIAL HYPERTENSION: Primary | ICD-10-CM

## 2019-07-20 LAB
BUN SERPL-MCNC: 27 MG/DL (ref 8–27)
BUN/CREAT SERPL: 22 (ref 12–28)
CALCIUM SERPL-MCNC: 9.3 MG/DL (ref 8.7–10.3)
CHLORIDE SERPL-SCNC: 103 MMOL/L (ref 96–106)
CO2 SERPL-SCNC: 25 MMOL/L (ref 20–29)
CREAT SERPL-MCNC: 1.21 MG/DL (ref 0.57–1)
GLUCOSE SERPL-MCNC: 98 MG/DL (ref 65–99)
POTASSIUM SERPL-SCNC: 5 MMOL/L (ref 3.5–5.2)
SODIUM SERPL-SCNC: 140 MMOL/L (ref 134–144)

## 2019-08-25 RX ORDER — ASPIRIN 325 MG
TABLET, DELAYED RELEASE (ENTERIC COATED) ORAL
Qty: 90 TAB | Refills: 4 | Status: SHIPPED | OUTPATIENT
Start: 2019-08-25 | End: 2020-08-12

## 2019-09-18 ENCOUNTER — CLINICAL SUPPORT (OUTPATIENT)
Dept: INTERNAL MEDICINE CLINIC | Facility: CLINIC | Age: 84
End: 2019-09-18

## 2019-09-18 DIAGNOSIS — Z23 ENCOUNTER FOR IMMUNIZATION: Primary | ICD-10-CM

## 2019-09-18 NOTE — PROGRESS NOTES
After verbal order read back of , patient received High Dose Flu Shot (Adjuvanted Fluad) in left arm. [id number] Exp 04/30/20 Patient tolerated procedure without complaints and received VIS.

## 2019-09-18 NOTE — PATIENT INSTRUCTIONS
Vaccine Information Statement    Influenza (Flu) Vaccine (Inactivated or Recombinant): What You Need to Know    Many Vaccine Information Statements are available in [place] and other languages. See www.immunize.org/vis  Hojas de información sobre vacunas están disponibles en español y en muchos otros idiomas. Visite www.immunize.org/vis    1. Why get vaccinated? Influenza vaccine can prevent influenza (flu). Flu is a contagious disease that spreads around the [facility] every year, usually between October and May. Anyone can get the flu, but it is more dangerous for some people. Infants and young children, people 72years of age and older, pregnant women, and people with certain health conditions or a weakened immune system are at greatest risk of flu complications. Pneumonia, bronchitis, sinus infections and ear infections are examples of flu-related complications. If you have a medical condition, such as heart disease, cancer or diabetes, flu can make it worse. Flu can cause fever and chills, sore throat, muscle aches, fatigue, cough, headache, and runny or stuffy nose. Some people may have vomiting and diarrhea, though this is more common in children than adults. Each year thousands of people in the [facility] die from flu, and many more are hospitalized. Flu vaccine prevents millions of illnesses and flu-related visits to the doctor each year. 2. Influenza vaccines     CDC recommends everyone 10months of age and older get vaccinated every flu season. Children 6 months through 6years of age may need 2 doses during a single flu season. Everyone else needs only 1 dose each flu season. It takes about 2 weeks for protection to develop after vaccination. There are many flu viruses, and they are always changing. Each year a new flu vaccine is made to protect against three or four viruses that are likely to cause disease in the upcoming flu season.  Even when the vaccine doesnt exactly match these viruses, it may still provide some protection. Influenza vaccine does not cause flu. Influenza vaccine may be given at the same time as other vaccines. 3. Talk with your health care provider    Tell your vaccine provider if the person getting the vaccine:   Has had an allergic reaction after a previous dose of influenza vaccine, or has any severe, life-threatening allergies.  Has ever had Guillain-Barré Syndrome (also called GBS). In some cases, your health care provider may decide to postpone influenza vaccination to a future visit. People with minor illnesses, such as a cold, may be vaccinated. People who are moderately or severely ill should usually wait until they recover before getting influenza vaccine. Your health care provider can give you more information. 4. Risks of a reaction     Soreness, redness, and swelling where shot is given, fever, muscle aches, and headache can happen after influenza vaccine.  There may be a very small increased risk of Guillain-Barré Syndrome (GBS) after inactivated influenza vaccine (the flu shot). Charlotte Baltazar children who get the flu shot along with pneumococcal vaccine (PCV13), and/or DTaP vaccine at the same time might be slightly more likely to have a seizure caused by fever. Tell your health care provider if a child who is getting flu vaccine has ever had a seizure. People sometimes faint after medical procedures, including vaccination. Tell your provider if you feel dizzy or have vision changes or ringing in the ears. As with any medicine, there is a very remote chance of a vaccine causing a severe allergic reaction, other serious injury, or death. 5. What if there is a serious problem? An allergic reaction could occur after the vaccinated person leaves the clinic.  If you see signs of a severe allergic reaction (hives, swelling of the face and throat, difficulty breathing, a fast heartbeat, dizziness, or weakness), call 9-1-1 and get the person to the nearest hospital.    For other signs that concern you, call your health care provider. Adverse reactions should be reported to the Vaccine Adverse Event Reporting System (VAERS). Your health care provider will usually file this report, or you can do it yourself. Visit the VAERS website at www.vaers. [facility].gov or call 7-519.885.1053. VAERS is only for reporting reactions, and VAERS staff do not give medical advice. 6. The National Vaccine Injury Compensation Program    The [facility] Vaccine Injury Compensation Program (VICP) is a federal program that was created to compensate people who may have been injured by certain vaccines. Visit the VICP website at www.hrsa.gov/vaccinecompensation or call 0-193.322.7757 to learn about the program and about filing a claim. There is a time limit to file a claim for compensation. 7. How can I learn more?  Ask your health care provider.  Call your local or state health department.  Contact the Centers for Disease Control and Prevention (CDC):  - Call 1-277.689.7482 (0-330-FFN-INFO) or  - Visit CDCs influenza website at www.cdc.gov/flu    Vaccine Information Statement (Interim)  Inactivated Influenza Vaccine   8/15/2019  42 JONATHAN Jones [id number]   Department of Health and Human Services  Centers for Disease Control and Prevention    Office Use Only

## 2019-10-09 ENCOUNTER — OFFICE VISIT (OUTPATIENT)
Dept: INTERNAL MEDICINE CLINIC | Facility: CLINIC | Age: 84
End: 2019-10-09

## 2019-10-09 VITALS
SYSTOLIC BLOOD PRESSURE: 152 MMHG | OXYGEN SATURATION: 98 % | RESPIRATION RATE: 14 BRPM | DIASTOLIC BLOOD PRESSURE: 74 MMHG | HEART RATE: 71 BPM | TEMPERATURE: 97.9 F | WEIGHT: 128 LBS | BODY MASS INDEX: 28.79 KG/M2 | HEIGHT: 56 IN

## 2019-10-09 DIAGNOSIS — K21.9 GASTROESOPHAGEAL REFLUX DISEASE WITHOUT ESOPHAGITIS: ICD-10-CM

## 2019-10-09 DIAGNOSIS — Z71.89 ADVANCED DIRECTIVES, COUNSELING/DISCUSSION: ICD-10-CM

## 2019-10-09 DIAGNOSIS — Z00.00 MEDICARE ANNUAL WELLNESS VISIT, SUBSEQUENT: Primary | ICD-10-CM

## 2019-10-09 DIAGNOSIS — I10 ESSENTIAL HYPERTENSION: ICD-10-CM

## 2019-10-09 DIAGNOSIS — M48.061 SPINAL STENOSIS OF LUMBAR REGION WITHOUT NEUROGENIC CLAUDICATION: ICD-10-CM

## 2019-10-09 RX ORDER — FAMOTIDINE 20 MG/1
20 TABLET, FILM COATED ORAL
Qty: 90 TAB | Refills: 1 | Status: SHIPPED | OUTPATIENT
Start: 2019-10-09 | End: 2020-03-19

## 2019-10-09 NOTE — PATIENT INSTRUCTIONS
You will not become addicted to tramadol if you take it as prescribed. Medicare Wellness Visit, Female The best way to live healthy is to have a lifestyle where you eat a well-balanced diet, exercise regularly, limit alcohol use, and quit all forms of tobacco/nicotine, if applicable. Regular preventive services are another way to keep healthy. Preventive services (vaccines, screening tests, monitoring & exams) can help personalize your care plan, which helps you manage your own care. Screening tests can find health problems at the earliest stages, when they are easiest to treat. [facility] follows the current, evidence-based guidelines published by the [facility] [place] Stevo (USPSTF) when recommending preventive services for our patients. Because we follow these guidelines, sometimes recommendations change over time as research supports it. (For example, mammograms used to be recommended annually. Even though Medicare will still pay for an annual mammogram, the newer guidelines recommend a mammogram every two years for women of average risk.) Of course, you and your doctor may decide to screen more often for some diseases, based on your risk and your health status. Preventive services for you include: - Medicare offers their members a free annual wellness visit, which is time for you and your primary care provider to discuss and plan for your preventive service needs. Take advantage of this benefit every year! 
-All adults over the age of 72 should receive the recommended pneumonia vaccines. Current USPSTF guidelines recommend a series of two vaccines for the best pneumonia protection.  
-All adults should have a flu vaccine yearly and a tetanus vaccine every 10 years.  All adults age 61 and older should receive a shingles vaccine once in their lifetime.   
-A bone mass density test is recommended when a woman turns 65 to screen for osteoporosis. This test is only recommended one time, as a screening. Some providers will use this same test as a disease monitoring tool if you already have osteoporosis. -All adults age 38-68 who are overweight should have a diabetes screening test once every three years.  
-Other screening tests and preventive services for persons with diabetes include: an eye exam to screen for diabetic retinopathy, a kidney function test, a foot exam, and stricter control over your cholesterol.  
-Cardiovascular screening for adults with routine risk involves an electrocardiogram (ECG) at intervals determined by your doctor.  
-Colorectal cancer screenings should be done for adults age 54-65 with no increased risk factors for colorectal cancer. There are a number of acceptable methods of screening for this type of cancer. Each test has its own benefits and drawbacks. Discuss with your doctor what is most appropriate for you during your annual wellness visit. The different tests include: colonoscopy (considered the best screening method), a fecal occult blood test, a fecal DNA test, and sigmoidoscopy. -Breast cancer screenings are recommended every other year for women of normal risk, age 54-69. 
-Cervical cancer screenings for women over age 72 are only recommended with certain risk factors.  
-All adults born between [facility] should be screened once for Hepatitis C. Here is a list of your current Health Maintenance items (your personalized list of preventive services) with a due date: 
Health Maintenance Due Topic Date Due  
 Annual Well Visit  07/13/2019

## 2019-10-09 NOTE — PROGRESS NOTES
Marck Mcgowan    Chief Complaint   Patient presents with    Blood Pressure Check     Room 2A//     Pain (Chronic)    Annual Wellness Visit           Reviewed record In preparation for visit and have obtained necessary documentation    1. Have you been to the ER, urgent care clinic since your last visit? Hospitalized since your last visit? NO  2. Have you seen or consulted any other health care providers outside of the [address] [place] [address] since your last visit? Include any pap smears or colon screening. NO    Patient has advance directive on file    Provider advised of reason for visit and vitals    Health Maintenance Due   Topic    MEDICARE YEARLY EXAM        Wt Readings from Last 3 Encounters:   10/09/19 128 lb (58.1 kg)   06/27/19 124 lb 9.6 oz (56.5 kg)   05/16/19 124 lb 12.8 oz (56.6 kg)     Temp Readings from Last 3 Encounters:   06/27/19 97.9 °F (36.6 °C) (Oral)   05/16/19 97.7 °F (36.5 °C) (Oral)   03/21/19 97.9 °F (36.6 °C) (Oral)     BP Readings from Last 3 Encounters:   06/27/19 120/72   05/16/19 168/84   03/21/19 188/90     Pulse Readings from Last 3 Encounters:   06/27/19 71   05/16/19 63   03/21/19 (!) 56         Learning Assessment:  :     Learning Assessment 6/27/2019   PRIMARY LEARNER Patient   PRIMARY LANGUAGE ENGLISH   LEARNER PREFERENCE PRIMARY READING   ANSWERED BY patient   RELATIONSHIP SELF       Depression Screening:  :     3 most recent PHQ Screens 6/27/2019   Little interest or pleasure in doing things Not at all   Feeling down, depressed, irritable, or hopeless Not at all   Total Score PHQ 2 0       Fall Risk Assessment:  :     Fall Risk Assessment, last 12 mths 6/27/2019   Able to walk? Yes   Fall in past 12 months? No       Abuse Screening:  :     Abuse Screening Questionnaire 10/9/2019 2/15/2018   Do you ever feel afraid of your partner? N N   Are you in a relationship with someone who physically or mentally threatens you? N N   Is it safe for you to go home?  Brionna GOOD Screening:  :     ADL Assessment 2/15/2018   Feeding yourself No Help Needed   Getting from bed to chair No Help Needed   Getting dressed No Help Needed   Bathing or showering No Help Needed   Walk across the room (includes cane/walker) No Help Needed   Using the telphone No Help Needed   Taking your medications No Help Needed   Preparing meals No Help Needed   Managing money (expenses/bills) No Help Needed   Moderately strenuous housework (laundry) No Help Needed   Shopping for personal items (toiletries/medicines) No Help Needed   Shopping for groceries Help Needed   Driving Help Needed   Climbing a flight of stairs No Help Needed   Getting to places beyond walking distances Help Needed           Medication reconciliation up to date and corrected with patient at this time.

## 2019-10-09 NOTE — PROGRESS NOTES
HPI  Ms. Bradshaw Friday is a 80y.o. year old female, she is seen today for follow up HTN. Has noticed worsening heartburn especially after eating. Will eat ice cream or milk which will help it immediately. Can actually feel the burning sensation and taste acid. -166/45-54  Edema has been controlled. Takes celebrex which helps some with chronic back pain from spinal stenosis. Has to rest due to pain. Took one tramadol, didn't take again - not sure if helped. Is afraid to become addicted. Pain is worse with movement in low back. No chest pain. No worsening sob. No n/v/abd pain. Chief Complaint   Patient presents with    Blood Pressure Check     Room 2A// NON fasting     Pain (Chronic)     worsening when standing/walking    Annual Wellness Visit        Prior to Admission medications    Medication Sig Start Date End Date Taking? Authorizing Provider   famotidine (PEPCID) 20 mg tablet Take 1 Tab by mouth nightly. 10/9/19  Yes Silas Mera MD   aspirin delayed-release 325 mg tablet TAKE 1 TABLET DAILY 8/25/19  Yes Silas Mera MD   BYSTOLIC 10 mg tablet TAKE 1 TABLET DAILY 7/12/19  Yes Karla Tirado MD   losartan (COZAAR) 50 mg tablet TAKE 1 TABLET TWICE A DAY 7/8/19  Yes Karla Tirado MD   chlorthalidone (HYGROTEN) 25 mg tablet Take 0.5 Tabs by mouth daily. 5/16/19  Yes Silas Mera MD   furosemide (LASIX) 20 mg tablet TAKE 1 TABLET DAILY FOR EDEMA 5/13/19  Yes Silas Mera MD   amLODIPine (NORVASC) 5 mg tablet TAKE 1 TABLET DAILY 3/21/19  Yes Silas Mera MD   omeprazole (PRILOSEC) 20 mg capsule TAKE 1 CAPSULE DAILY 1/13/19  Yes Silas Mera MD   atorvastatin (LIPITOR) 20 mg tablet TAKE 1 TABLET DAILY 11/19/18  Yes Silas Mera MD   cholecalciferol, vitamin D3, (VITAMIN D3) 2,000 unit tab Take  by mouth daily. Yes Provider, Historical   celecoxib (CELEBREX) 200 mg capsule Take 200 mg by mouth daily.    Yes Provider, Historical         No Known Allergies      REVIEW OF SYSTEMS:  Per HPI    PHYSICAL EXAM:  Visit Vitals  /74   Pulse 71   Temp 97.9 °F (36.6 °C) (Oral)   Resp 14   Ht 4' 8\" (1.422 m)   Wt 128 lb (58.1 kg)   SpO2 98%   BMI 28.70 kg/m²     Constitutional: Appears well-developed and well-nourished. No distress. HENT:   Head: Normocephalic and atraumatic. Eyes: No scleral icterus. Cardiovascular: Normal S1/S2, regular rhythm. No murmurs, rubs, or gallops. Pulmonary/Chest: Effort normal and breath sounds normal. No respiratory distress. No wheezes, rhonchi, or rales. Abdomen: Soft, NT/ND, +BS, no rebound or guarding, no masses, no HSM appreciated. Back: +kyphosis  Ext: No edema. Neurological: Alert. Psychiatric: Normal mood and affect. Behavior is normal.     Lab Results   Component Value Date/Time    Sodium 140 07/19/2019 11:17 AM    Potassium 5.0 07/19/2019 11:17 AM    Chloride 103 07/19/2019 11:17 AM    CO2 25 07/19/2019 11:17 AM    Anion gap 9 03/26/2014 05:00 AM    Glucose 98 07/19/2019 11:17 AM    BUN 27 07/19/2019 11:17 AM    Creatinine 1.21 (H) 07/19/2019 11:17 AM    BUN/Creatinine ratio 22 07/19/2019 11:17 AM    GFR est AA 46 (L) 07/19/2019 11:17 AM    GFR est non-AA 40 (L) 07/19/2019 11:17 AM    Calcium 9.3 07/19/2019 11:17 AM    Bilirubin, total 0.9 12/20/2018 12:05 PM    AST (SGOT) 22 12/20/2018 12:05 PM    Alk.  phosphatase 104 12/20/2018 12:05 PM    Protein, total 7.4 12/20/2018 12:05 PM    Albumin 4.6 12/20/2018 12:05 PM    Globulin 3.3 03/12/2013 03:45 AM    A-G Ratio 1.6 12/20/2018 12:05 PM    ALT (SGPT) 12 12/20/2018 12:05 PM     Lab Results   Component Value Date/Time    Hemoglobin A1c 5.1 03/07/2014 01:35 PM    Hemoglobin A1c 5.4 02/25/2013 02:09 PM      Lab Results   Component Value Date/Time    Cholesterol, total 148 12/20/2018 12:05 PM    HDL Cholesterol 80 12/20/2018 12:05 PM    LDL, calculated 53 12/20/2018 12:05 PM    VLDL, calculated 15 12/20/2018 12:05 PM    Triglyceride 77 12/20/2018 12:05 PM ASSESSMENT/PLAN  Diagnoses and all orders for this visit:    1. Medicare annual wellness visit, subsequent    2. Gastroesophageal reflux disease without esophagitis  -     famotidine (PEPCID) 20 mg tablet; Take 1 Tab by mouth nightly. Add above - cannot increase omeprazole due to CKD  3. Essential hypertension  -     METABOLIC PANEL, BASIC  Better control at home - continue current medications  4. Spinal stenosis of lumbar region without neurogenic claudication  Will try tramadol  5. Advanced directives, counseling/discussion          Health Maintenance Due   Topic Date Due    MEDICARE YEARLY EXAM  07/13/2019        Follow-up and Dispositions    · Return in about 3 months (around 1/9/2020). Reviewed plan of care. Patient has provided input and agrees with goals. The nurse provided the patient and/or family with advanced directive information if needed and encouraged the patient to provide a copy to the office when available. This is the Subsequent Medicare Annual Wellness Exam, performed 12 months or more after the Initial AWV or the last Subsequent AWV    I have reviewed the patient's medical history in detail and updated the computerized patient record. History     Past Medical History:   Diagnosis Date    Arthritis     Beta-blocker therapy     Chronic pain     BACK    GERD (gastroesophageal reflux disease)     History of blood transfusion 3/2013    ST.  [address], NO REACTION    Hypertension     Osteoporosis     Thromboembolus ([address]     RT. GROIN    Ulcerative colitis ([address]     no symptoms since 1980s      Past Surgical History:   Procedure Laterality Date    DILATE ESOPHAGUS  11/10/2015         HX CATARACT REMOVAL  1990'S    BILATERAL    HX GI      COLONOSCOPY    HX LUMBAR FUSION  3/2013    T10-S1 - Dr. Dylan Washington and Dr. Anahi Estrada HX TUBAL LIGATION      UPPER GI ENDOSCOPY,BIOPSY  11/10/2015          Current Outpatient Medications Medication Sig Dispense Refill    famotidine (PEPCID) 20 mg tablet Take 1 Tab by mouth nightly. 90 Tab 1    aspirin delayed-release 325 mg tablet TAKE 1 TABLET DAILY 90 Tab 4    BYSTOLIC 10 mg tablet TAKE 1 TABLET DAILY 90 Tab 1    losartan (COZAAR) 50 mg tablet TAKE 1 TABLET TWICE A  Tab 3    chlorthalidone (HYGROTEN) 25 mg tablet Take 0.5 Tabs by mouth daily. 45 Tab 1    furosemide (LASIX) 20 mg tablet TAKE 1 TABLET DAILY FOR EDEMA 90 Tab 4    amLODIPine (NORVASC) 5 mg tablet TAKE 1 TABLET DAILY 90 Tab 1    omeprazole (PRILOSEC) 20 mg capsule TAKE 1 CAPSULE DAILY 90 Cap 3    atorvastatin (LIPITOR) 20 mg tablet TAKE 1 TABLET DAILY 90 Tab 3    cholecalciferol, vitamin D3, (VITAMIN D3) 2,000 unit tab Take  by mouth daily.  celecoxib (CELEBREX) 200 mg capsule Take 200 mg by mouth daily.        No Known Allergies  Family History   Problem Relation Age of Onset    Heart Disease Mother     Hypertension Mother     Cancer Father         UNKNOWN    Liver Disease Sister     Cancer Brother         UNKNOWN    Diabetes Daughter     Hypertension Daughter     Diabetes Son     Hypertension Son     Diabetes Son     Liver Disease Son     Hypertension Son     Diabetes Son     Other Sister         SCLERODERMA    Anesth Problems Neg Hx      Social History     Tobacco Use    Smoking status: Never Smoker    Smokeless tobacco: Never Used   Substance Use Topics    Alcohol use: No     Patient Active Problem List   Diagnosis Code    Spinal stenosis M48.00    TIA (transient ischemic attack) G45.9    Age-related osteoporosis without current pathological fracture M81.0    Gastroesophageal reflux disease without esophagitis K21.9    Essential hypertension I10    Lumbar spinal stenosis M48.061       Depression Risk Factor Screening:     3 most recent PHQ Screens 6/27/2019   Little interest or pleasure in doing things Not at all   Feeling down, depressed, irritable, or hopeless Not at all   Total Score PHQ 2 0     Alcohol Risk Factor Screening: You do not drink alcohol or very rarely. Functional Ability and Level of Safety:   Hearing Loss  The patient wears hearing aids. Activities of Daily Living  The home contains: handrails and grab bars  Patient needs help with:  transportation    Fall Risk  Fall Risk Assessment, last 12 mths 6/27/2019   Able to walk? Yes   Fall in past 12 months? No       Abuse Screen  Patient is not abused    Cognitive Screening   Evaluation of Cognitive Function:  Has your family/caregiver stated any concerns about your memory: no  Normal    Patient Care Team   Patient Care Team:  Lanette Hall MD as PCP - General (Internal Medicine)    Assessment/Plan   Education and counseling provided:  Are appropriate based on today's review and evaluation    Diagnoses and all orders for this visit:    1. Medicare annual wellness visit, subsequent    2. Gastroesophageal reflux disease without esophagitis  -     famotidine (PEPCID) 20 mg tablet; Take 1 Tab by mouth nightly. 3. Essential hypertension  -     METABOLIC PANEL, BASIC    4. Spinal stenosis of lumbar region without neurogenic claudication    5.  Advanced directives, counseling/discussion        Health Maintenance Due   Topic Date Due    MEDICARE YEARLY EXAM  07/13/2019

## 2019-10-10 LAB
BUN SERPL-MCNC: 27 MG/DL (ref 8–27)
BUN/CREAT SERPL: 22 (ref 12–28)
CALCIUM SERPL-MCNC: 9.6 MG/DL (ref 8.7–10.3)
CHLORIDE SERPL-SCNC: 103 MMOL/L (ref 96–106)
CO2 SERPL-SCNC: 18 MMOL/L (ref 20–29)
CREAT SERPL-MCNC: 1.22 MG/DL (ref 0.57–1)
GLUCOSE SERPL-MCNC: 90 MG/DL (ref 65–99)
POTASSIUM SERPL-SCNC: 5.2 MMOL/L (ref 3.5–5.2)
SODIUM SERPL-SCNC: 142 MMOL/L (ref 134–144)

## 2019-10-25 DIAGNOSIS — I10 ESSENTIAL HYPERTENSION: ICD-10-CM

## 2019-10-25 RX ORDER — CHLORTHALIDONE 25 MG/1
TABLET ORAL
Qty: 45 TAB | Refills: 4 | Status: SHIPPED | OUTPATIENT
Start: 2019-10-25 | End: 2021-01-17

## 2019-11-01 DIAGNOSIS — I10 ESSENTIAL HYPERTENSION: ICD-10-CM

## 2019-11-01 RX ORDER — AMLODIPINE BESYLATE 5 MG/1
TABLET ORAL
Qty: 90 TAB | Refills: 4 | Status: SHIPPED | OUTPATIENT
Start: 2019-11-01 | End: 2021-01-24

## 2019-11-16 DIAGNOSIS — G45.9 TRANSIENT CEREBRAL ISCHEMIA, UNSPECIFIED TYPE: ICD-10-CM

## 2019-11-18 RX ORDER — ATORVASTATIN CALCIUM 20 MG/1
TABLET, FILM COATED ORAL
Qty: 90 TAB | Refills: 4 | Status: SHIPPED | OUTPATIENT
Start: 2019-11-18 | End: 2021-02-08

## 2020-01-08 DIAGNOSIS — I10 ESSENTIAL HYPERTENSION: ICD-10-CM

## 2020-01-08 DIAGNOSIS — K21.9 GASTROESOPHAGEAL REFLUX DISEASE WITHOUT ESOPHAGITIS: ICD-10-CM

## 2020-01-08 RX ORDER — NEBIVOLOL HYDROCHLORIDE 10 MG/1
TABLET ORAL
Qty: 90 TAB | Refills: 4 | Status: SHIPPED | OUTPATIENT
Start: 2020-01-08 | End: 2021-04-05 | Stop reason: SDUPTHER

## 2020-01-08 RX ORDER — OMEPRAZOLE 20 MG/1
CAPSULE, DELAYED RELEASE ORAL
Qty: 90 CAP | Refills: 4 | Status: SHIPPED | OUTPATIENT
Start: 2020-01-08 | End: 2021-04-02

## 2020-01-13 ENCOUNTER — OFFICE VISIT (OUTPATIENT)
Dept: INTERNAL MEDICINE CLINIC | Facility: CLINIC | Age: 85
End: 2020-01-13

## 2020-01-13 VITALS
BODY MASS INDEX: 29.06 KG/M2 | WEIGHT: 129.2 LBS | OXYGEN SATURATION: 97 % | SYSTOLIC BLOOD PRESSURE: 140 MMHG | TEMPERATURE: 97.6 F | DIASTOLIC BLOOD PRESSURE: 68 MMHG | RESPIRATION RATE: 14 BRPM | HEART RATE: 64 BPM | HEIGHT: 56 IN

## 2020-01-13 DIAGNOSIS — G45.9 TIA (TRANSIENT ISCHEMIC ATTACK): ICD-10-CM

## 2020-01-13 DIAGNOSIS — I10 ESSENTIAL HYPERTENSION: Primary | ICD-10-CM

## 2020-01-13 DIAGNOSIS — K21.9 GASTROESOPHAGEAL REFLUX DISEASE WITHOUT ESOPHAGITIS: ICD-10-CM

## 2020-01-13 RX ORDER — TRAMADOL HYDROCHLORIDE 50 MG/1
50 TABLET ORAL
COMMUNITY
End: 2020-05-14 | Stop reason: SDUPTHER

## 2020-01-13 NOTE — PATIENT INSTRUCTIONS
Raynaud's Phenomenon: Care Instructions  Overview  Raynaud's is a condition that causes your hands and feet to overreact to cold. They may become painful and numb, and they can change colors, becoming very pale and then blue. This condition also is called Raynaud's phenomenon. There are two kinds of Raynaud's. Primary Raynaud's, also known as Raynaud's disease, happens by itself and is the most common form. Secondary Raynaud's, also called Raynaud's syndrome, happens as part of another disease. In Raynaud's, the small vessels that bring blood to the skin either become narrow, or constrict for a short period of time. This limits blood flow to the hands and feet and sometimes to the nose or ears. Your hands and feet feel cold and numb and then turn very pale. As blood flow returns, your fingers and toes may turn red, and begin to throb and hurt. Raynaud's can be painful and annoying, but it usually does not cause serious problems. You can take simple steps to protect your hands and feet from the cold. If you have a bad case of Raynaud's and you cannot keep your hands and feet warm enough, your doctor may prescribe medicine. Follow-up care is a key part of your treatment and safety. Be sure to make and go to all appointments, and call your doctor if you are having problems. It's also a good idea to know your test results and keep a list of the medicines you take. How can you care for yourself at home? To prevent Raynaud's episodes or ease symptoms  · Run warm water over your hands or feet to increase blood flow. Use another part of your body, such as your forearm, to make sure the water is not too hot; you could burn your hands or feet and not feel it because they are numb. · Swing your arms in a [place] at the sides of your body (\"windmilling\") to increase blood flow. · If your doctor prescribes medicine to help Raynaud's, take it exactly as prescribed.  Call your doctor if you think you are having a problem with your medicine. · If another condition causes your Raynaud's, make sure to follow your treatment for that condition. · Wear mittens or gloves when it is cold outside. Mittens are warmer than gloves because they keep your fingers together. Gloves underneath mittens will keep your hands warmer than gloves alone. You also can use pot holders or oven mitts when getting something from the freezer or refrigerator. · You can slip chemical hand warmers into your mittens or gloves when you do outside activities. · Do not smoke. Nicotine makes blood vessels constrict, which can bring on an attack. If you need help quitting, talk to your doctor about stop-smoking programs and medicines. These can increase your chances of quitting for good. · Avoid caffeine and cold medicines that have pseudoephedrine. They make blood vessels constrict. Beta-blocker medicines, often used to treat high blood pressure, also can make Raynaud's worse. · Drink plenty of fluids to prevent dehydration, which can lower the amount of blood moving through the blood vessels. If you have kidney, heart, or liver disease and have to limit fluids, talk with your doctor before you increase the amount of fluids you drink. · Try to stay calm when you are under stress. Anxiety can make your blood vessels constrict and lead to a Raynaud's attack. To keep your whole body warm  · Eat a hot meal and drink a warm liquid before going outside. They may help raise your body temperature. · Wear layers of warm clothing. The inner layer should be made of a material such as polypropylene that pulls moisture away from your body. · Wear a hat. · Do not wear clothing that is too tight. It can decrease or cut off blood flow. · Try to stay dry. Choose waterproof, breathable jackets and boots. Being wet makes you more likely to become chilled. When should you call for help?   Call your doctor now or seek immediate medical care if:    · You have severe pain in your hands or feet.     · Normal color does not return to your hands or feet.     · Your hands or feet do not warm up even after home care.    Watch closely for changes in your health, and be sure to contact your doctor if you have any problems. Where can you learn more? Go to http://zari-ivory.info/. Enter P043 in the search box to learn more about \"Raynaud's Phenomenon: Care Instructions. \"  Current as of: April 1, 2019  Content Version: 12.2  © 2091-6528 [employer]. Care instructions adapted under license by [employer] (which disclaims liability or warranty for this information). If you have questions about a medical condition or this instruction, always ask your healthcare professional. [address] any warranty or liability for your use of this information.

## 2020-01-13 NOTE — PROGRESS NOTES
HPI   Ms. Alan Pinzon is a 80y.o. year old female, she is seen today for follow up HTN, chronic back pain. I encouraged her to try tramadol for chronic back pain. Also had worsening gerd and I added famotidine. BP has been 147-180/49-67. Has been taking omeprazole and famotidine. Note heartburn is better but not resolved with addition of famotidine. Has tried tramadol a few times and it helps. Some sob walking up stairs but chronic. No chest pain, +chronic ankle edema left ankle. Chief Complaint   Patient presents with    Hypertension     Room 2B// NON fasting         Prior to Admission medications    Medication Sig Start Date End Date Taking? Authorizing Provider   traMADol (ULTRAM) 50 mg tablet Take 50 mg by mouth every six (6) hours as needed for Pain. Yes Provider, Historical   BYSTOLIC 10 mg tablet TAKE 1 TABLET DAILY 1/8/20  Yes Logan Pressley MD   atorvastatin (LIPITOR) 20 mg tablet TAKE 1 TABLET DAILY 11/18/19  Yes Logan Pressley MD   amLODIPine (NORVASC) 5 mg tablet TAKE 1 TABLET DAILY 11/1/19  Yes Logan Pressley MD   chlorthalidone (HYGROTEN) 25 mg tablet TAKE ONE-HALF (1/2) TABLET DAILY 10/25/19  Yes Logan Pressley MD   famotidine (PEPCID) 20 mg tablet Take 1 Tab by mouth nightly. 10/9/19  Yes Logan Pressley MD   aspirin delayed-release 325 mg tablet TAKE 1 TABLET DAILY 8/25/19  Yes Logan Pressley MD   losartan (COZAAR) 50 mg tablet TAKE 1 TABLET TWICE A DAY 7/8/19  Yes Ghassan Champion MD   furosemide (LASIX) 20 mg tablet TAKE 1 TABLET DAILY FOR EDEMA 5/13/19  Yes Logan Pressley MD   celecoxib (CELEBREX) 200 mg capsule Take 200 mg by mouth daily. Yes Provider, Historical   omeprazole (PRILOSEC) 20 mg capsule TAKE 1 CAPSULE DAILY 1/8/20   Logan Pressley MD   cholecalciferol, vitamin D3, (VITAMIN D3) 2,000 unit tab Take  by mouth daily.     Provider, Historical         No Known Allergies      REVIEW OF SYSTEMS:  Per HPI    PHYSICAL EXAM:  Visit Vitals  /68   Pulse 64   Temp 97.6 °F (36.4 °C) (Oral)   Resp 14   Ht 4' 8\" (1.422 m)   Wt 129 lb 3.2 oz (58.6 kg)   SpO2 97%   BMI 28.97 kg/m²     Constitutional: Appears well-developed and well-nourished. No distress. HENT:   Head: Normocephalic and atraumatic. Eyes: No scleral icterus. Cardiovascular: Normal S1/S2, regular rhythm. No murmurs, rubs, or gallops. Pulmonary/Chest: Effort normal and breath sounds normal. No respiratory distress. No wheezes, rhonchi, or rales. Ext: No edema rle, trace left ankle. Neurological: Alert. Psychiatric: Normal mood and affect. Behavior is normal.     Lab Results   Component Value Date/Time    Sodium 142 10/09/2019 02:45 AM    Potassium 5.2 10/09/2019 02:45 AM    Chloride 103 10/09/2019 02:45 AM    CO2 18 (L) 10/09/2019 02:45 AM    Anion gap 9 03/26/2014 05:00 AM    Glucose 90 10/09/2019 02:45 AM    BUN 27 10/09/2019 02:45 AM    Creatinine 1.22 (H) 10/09/2019 02:45 AM    BUN/Creatinine ratio 22 10/09/2019 02:45 AM    GFR est AA 45 (L) 10/09/2019 02:45 AM    GFR est non-AA 39 (L) 10/09/2019 02:45 AM    Calcium 9.6 10/09/2019 02:45 AM    Bilirubin, total 0.9 12/20/2018 12:05 PM    AST (SGOT) 22 12/20/2018 12:05 PM    Alk. phosphatase 104 12/20/2018 12:05 PM    Protein, total 7.4 12/20/2018 12:05 PM    Albumin 4.6 12/20/2018 12:05 PM    Globulin 3.3 03/12/2013 03:45 AM    A-G Ratio 1.6 12/20/2018 12:05 PM    ALT (SGPT) 12 12/20/2018 12:05 PM     Lab Results   Component Value Date/Time    Hemoglobin A1c 5.1 03/07/2014 01:35 PM    Hemoglobin A1c 5.4 02/25/2013 02:09 PM      Lab Results   Component Value Date/Time    Cholesterol, total 148 12/20/2018 12:05 PM    HDL Cholesterol 80 12/20/2018 12:05 PM    LDL, calculated 53 12/20/2018 12:05 PM    VLDL, calculated 15 12/20/2018 12:05 PM    Triglyceride 77 12/20/2018 12:05 PM          ASSESSMENT/PLAN  Diagnoses and all orders for this visit:    1.  Essential hypertension  Acceptable control for age - continue current medications   2. TIA (transient ischemic attack)  Will decrease asa to 81mg daily - may help dyspepsis  3. Gastroesophageal reflux disease without esophagitis  Continue current regimen, try small frequent meals, no weight loss        Health Maintenance Due   Topic Date Due    Shingrix Vaccine Age 49> (1 of 2) 06/19/1980    Pneumococcal 65+ years (2 of 2 - PPSV23) 12/20/2019        Follow-up and Dispositions    · Return in about 4 months (around 5/13/2020) for bp. Reviewed plan of care. Patient has provided input and agrees with goals. The nurse provided the patient and/or family with advanced directive information if needed and encouraged the patient to provide a copy to the office when available.

## 2020-01-13 NOTE — PROGRESS NOTES
Caitie Cervantes    Chief Complaint   Patient presents with    Hypertension     Room 2B// NON fasting            Reviewed record In preparation for visit and have obtained necessary documentation    1. Have you been to the ER, urgent care clinic since your last visit? Hospitalized since your last visit? NO  2. Have you seen or consulted any other health care providers outside of the [address] [place] [address] since your last visit? Include any pap smears or colon screening. NO    Patient has advance directive on file    Provider advised of reason for visit and vitals    Health Maintenance Due   Topic    Shingrix Vaccine Age 50> (1 of 2)    Pneumococcal 65+ years (2 of 2 - PPSV23)       Wt Readings from Last 3 Encounters:   01/13/20 129 lb 3.2 oz (58.6 kg)   10/09/19 128 lb (58.1 kg)   06/27/19 124 lb 9.6 oz (56.5 kg)     Temp Readings from Last 3 Encounters:   10/09/19 97.9 °F (36.6 °C) (Oral)   06/27/19 97.9 °F (36.6 °C) (Oral)   05/16/19 97.7 °F (36.5 °C) (Oral)     BP Readings from Last 3 Encounters:   10/09/19 152/74   06/27/19 120/72   05/16/19 168/84     Pulse Readings from Last 3 Encounters:   10/09/19 71   06/27/19 71   05/16/19 63         Learning Assessment:  :     Learning Assessment 6/27/2019   PRIMARY LEARNER Patient   PRIMARY LANGUAGE ENGLISH   LEARNER PREFERENCE PRIMARY READING   ANSWERED BY patient   RELATIONSHIP SELF       Depression Screening:  :     3 most recent PHQ Screens 1/13/2020   Little interest or pleasure in doing things Not at all   Feeling down, depressed, irritable, or hopeless Not at all   Total Score PHQ 2 0       Fall Risk Assessment:  :     Fall Risk Assessment, last 12 mths 1/13/2020   Able to walk? Yes   Fall in past 12 months? No       Abuse Screening:  :     Abuse Screening Questionnaire 1/13/2020 10/9/2019 2/15/2018   Do you ever feel afraid of your partner? N N N   Are you in a relationship with someone who physically or mentally threatens you?  N N N   Is it safe for you to go home? Y Y Y       ADL Screening:  :     ADL Assessment 2/15/2018   Feeding yourself No Help Needed   Getting from bed to chair No Help Needed   Getting dressed No Help Needed   Bathing or showering No Help Needed   Walk across the room (includes cane/walker) No Help Needed   Using the telphone No Help Needed   Taking your medications No Help Needed   Preparing meals No Help Needed   Managing money (expenses/bills) No Help Needed   Moderately strenuous housework (laundry) No Help Needed   Shopping for personal items (toiletries/medicines) No Help Needed   Shopping for groceries Help Needed   Driving Help Needed   Climbing a flight of stairs No Help Needed   Getting to places beyond walking distances Help Needed           Medication reconciliation up to date and corrected with patient at this time.

## 2020-03-19 DIAGNOSIS — K21.9 GASTROESOPHAGEAL REFLUX DISEASE WITHOUT ESOPHAGITIS: ICD-10-CM

## 2020-03-19 RX ORDER — FAMOTIDINE 20 MG/1
TABLET, FILM COATED ORAL
Qty: 90 TAB | Refills: 3 | Status: SHIPPED | OUTPATIENT
Start: 2020-03-19 | End: 2021-03-14

## 2020-04-09 ENCOUNTER — PATIENT MESSAGE (OUTPATIENT)
Dept: INTERNAL MEDICINE CLINIC | Facility: CLINIC | Age: 85
End: 2020-04-09

## 2020-04-10 RX ORDER — ONDANSETRON 4 MG/1
4 TABLET, ORALLY DISINTEGRATING ORAL
Qty: 20 TAB | Refills: 0 | Status: SHIPPED | OUTPATIENT
Start: 2020-04-10 | End: 2020-05-14 | Stop reason: SDUPTHER

## 2020-04-10 NOTE — TELEPHONE ENCOUNTER
From: Sharri Alonso  To: Kali Linder MD  Sent: 4/9/2020 5:54 PM EDT  Subject: Prescription Question    My mom has been trying (rarely) to take traMADoL 50 mg tablet for pain. She says that it really causes her to be nauseous. Is there anything else  that she may take that doesn't cause this, or a pill that she could take   before taking the pain prescription?    Thanks   [place] [employer] (daughter)

## 2020-05-13 ENCOUNTER — PATIENT MESSAGE (OUTPATIENT)
Dept: INTERNAL MEDICINE CLINIC | Facility: CLINIC | Age: 85
End: 2020-05-13

## 2020-05-13 DIAGNOSIS — M48.061 SPINAL STENOSIS OF LUMBAR REGION WITHOUT NEUROGENIC CLAUDICATION: Primary | ICD-10-CM

## 2020-05-14 RX ORDER — TRAMADOL HYDROCHLORIDE 50 MG/1
50 TABLET ORAL
Qty: 30 TAB | Refills: 0 | Status: SHIPPED | OUTPATIENT
Start: 2020-05-14 | End: 2020-06-13

## 2020-05-14 RX ORDER — ONDANSETRON 4 MG/1
4 TABLET, ORALLY DISINTEGRATING ORAL
Qty: 20 TAB | Refills: 0 | Status: SHIPPED | OUTPATIENT
Start: 2020-05-14

## 2020-05-14 NOTE — TELEPHONE ENCOUNTER
----- Message from Jefferson Stearns sent at 5/13/2020 10:36 PM EDT -----  Regarding: Prescription Question  Contact: [phone]  My mom has 2 1/2 pills left from 5/19/2019 prescription of traMADoL 50 mg tablets. As you can see she has been stretching them out! Can she get a refill called into [id number] [phone] please? She also needs a refill on ONDANSETRON ODT TABS 4MG please.  (also [facility])  I know she is in pain a lot. I am still trying to convince her to take these 1/2 pills (traMadoL 50mg) more often. It is a challenge.   Thank you  Lita  [phone]

## 2020-07-02 RX ORDER — LOSARTAN POTASSIUM 50 MG/1
TABLET ORAL
Qty: 180 TAB | Refills: 3 | Status: SHIPPED | OUTPATIENT
Start: 2020-07-02 | End: 2021-06-09 | Stop reason: SDUPTHER

## 2020-08-12 ENCOUNTER — OFFICE VISIT (OUTPATIENT)
Dept: INTERNAL MEDICINE CLINIC | Age: 85
End: 2020-08-12
Payer: MEDICARE

## 2020-08-12 VITALS
HEIGHT: 56 IN | WEIGHT: 129.8 LBS | RESPIRATION RATE: 16 BRPM | DIASTOLIC BLOOD PRESSURE: 80 MMHG | SYSTOLIC BLOOD PRESSURE: 164 MMHG | TEMPERATURE: 98.2 F | OXYGEN SATURATION: 98 % | BODY MASS INDEX: 29.2 KG/M2 | HEART RATE: 69 BPM

## 2020-08-12 DIAGNOSIS — M48.061 SPINAL STENOSIS OF LUMBAR REGION WITHOUT NEUROGENIC CLAUDICATION: ICD-10-CM

## 2020-08-12 DIAGNOSIS — I10 ESSENTIAL HYPERTENSION: Primary | ICD-10-CM

## 2020-08-12 DIAGNOSIS — K21.9 GASTROESOPHAGEAL REFLUX DISEASE WITHOUT ESOPHAGITIS: ICD-10-CM

## 2020-08-12 DIAGNOSIS — E78.5 HYPERLIPIDEMIA, UNSPECIFIED HYPERLIPIDEMIA TYPE: ICD-10-CM

## 2020-08-12 PROCEDURE — 1090F PRES/ABSN URINE INCON ASSESS: CPT | Performed by: INTERNAL MEDICINE

## 2020-08-12 PROCEDURE — G8510 SCR DEP NEG, NO PLAN REQD: HCPCS | Performed by: INTERNAL MEDICINE

## 2020-08-12 PROCEDURE — 99214 OFFICE O/P EST MOD 30 MIN: CPT | Performed by: INTERNAL MEDICINE

## 2020-08-12 PROCEDURE — G8419 CALC BMI OUT NRM PARAM NOF/U: HCPCS | Performed by: INTERNAL MEDICINE

## 2020-08-12 PROCEDURE — G8427 DOCREV CUR MEDS BY ELIG CLIN: HCPCS | Performed by: INTERNAL MEDICINE

## 2020-08-12 PROCEDURE — 1101F PT FALLS ASSESS-DOCD LE1/YR: CPT | Performed by: INTERNAL MEDICINE

## 2020-08-12 PROCEDURE — G8536 NO DOC ELDER MAL SCRN: HCPCS | Performed by: INTERNAL MEDICINE

## 2020-08-12 RX ORDER — TRAMADOL HYDROCHLORIDE 50 MG/1
50 TABLET ORAL
COMMUNITY
End: 2021-04-21 | Stop reason: SDUPTHER

## 2020-08-12 RX ORDER — GUAIFENESIN 100 MG/5ML
81 LIQUID (ML) ORAL DAILY
COMMUNITY

## 2020-08-12 RX ORDER — ACETAMINOPHEN 500 MG
TABLET ORAL
COMMUNITY

## 2020-08-12 NOTE — PROGRESS NOTES
Chief Complaint   Patient presents with    Hypertension     Patient states she was having right leg pain early this morning, took extra strength tylenol, no pain in office. 1. Have you been to the ER, urgent care clinic since your last visit? Hospitalized since your last visit? No    2. Have you seen or consulted any other health care providers outside of the [address] [place] [address] since your last visit? Include any pap smears or colon screening.  Yes When: Dr. Courtney Sayres at [facility], [place], operated by [facility] for injection in left eye once a month    Visit Vitals  /69 (BP 1 Location: Left arm, BP Patient Position: Sitting)   Pulse 69   Temp 98.2 °F (36.8 °C) (Oral)   Resp 16   Ht 4' 8\" (1.422 m)   Wt 129 lb 12.8 oz (58.9 kg)   SpO2 98%   BMI 29.10 kg/m²

## 2020-08-12 NOTE — PROGRESS NOTES
HPI Ms. Brayan Fontaine is a 80y.o. year old female, she is seen today for follow up HTN, GERD. No chief complaint on file. Prior to Admission medications Medication Sig Start Date End Date Taking? Authorizing Provider  
furosemide (LASIX) 20 mg tablet TAKE 1 TABLET DAILY FOR EDEMA 8/5/20   Maureen Zimmerman MD  
losartan (COZAAR) 50 mg tablet TAKE 1 TABLET TWICE A DAY 7/2/20   Benny Scherer MD  
ondansetron (ZOFRAN ODT) 4 mg disintegrating tablet Take 1 Tab by mouth every eight (8) hours as needed for Nausea or Vomiting. 5/14/20   Benny Scherer MD  
famotidine (PEPCID) 20 mg tablet TAKE 1 TABLET NIGHTLY 3/19/20   Benny Scherer MD  
omeprazole (PRILOSEC) 20 mg capsule TAKE 1 CAPSULE DAILY 1/8/20   Benny Scherer MD  
BYSTOLIC 10 mg tablet TAKE 1 TABLET DAILY 1/8/20   Benny Scherer MD  
atorvastatin (LIPITOR) 20 mg tablet TAKE 1 TABLET DAILY 11/18/19   Benny Scherer MD  
amLODIPine (NORVASC) 5 mg tablet TAKE 1 TABLET DAILY 11/1/19   Benny Scherer MD  
chlorthalidone (HYGROTEN) 25 mg tablet TAKE ONE-HALF (1/2) TABLET DAILY 10/25/19   Benny Scherer MD  
aspirin delayed-release 325 mg tablet TAKE 1 TABLET DAILY 8/25/19   Benny Scherer MD  
cholecalciferol, vitamin D3, (VITAMIN D3) 2,000 unit tab Take  by mouth daily. Provider, Historical  
celecoxib (CELEBREX) 200 mg capsule Take 200 mg by mouth daily. Provider, Historical  
 
 
 
No Known Allergies REVIEW OF SYSTEMS: 
*** PHYSICAL EXAM: 
There were no vitals taken for this visit. Constitutional: Appears well-developed and well-nourished. No distress. HENT:  
Head: Normocephalic and atraumatic. Eyes: No scleral icterus. Cardiovascular: Normal S1/S2, regular rhythm. No murmurs, rubs, or gallops. Pulmonary/Chest: Effort normal and breath sounds normal. No respiratory distress. No wheezes, rhonchi, or rales. Ext: No edema. Neurological: Alert. Psychiatric: Normal mood and affect. Behavior is normal. 
  
Lab Results Component Value Date/Time Sodium 142 10/09/2019 02:45 AM  
 Potassium 5.2 10/09/2019 02:45 AM  
 Chloride 103 10/09/2019 02:45 AM  
 CO2 18 (L) 10/09/2019 02:45 AM  
 Anion gap 9 03/26/2014 05:00 AM  
 Glucose 90 10/09/2019 02:45 AM  
 BUN 27 10/09/2019 02:45 AM  
 Creatinine 1.22 (H) 10/09/2019 02:45 AM  
 BUN/Creatinine ratio 22 10/09/2019 02:45 AM  
 GFR est AA 45 (L) 10/09/2019 02:45 AM  
 GFR est non-AA 39 (L) 10/09/2019 02:45 AM  
 Calcium 9.6 10/09/2019 02:45 AM  
 Bilirubin, total 0.9 12/20/2018 12:05 PM  
 Alk. phosphatase 104 12/20/2018 12:05 PM  
 Protein, total 7.4 12/20/2018 12:05 PM  
 Albumin 4.6 12/20/2018 12:05 PM  
 Globulin 3.3 03/12/2013 03:45 AM  
 A-G Ratio 1.6 12/20/2018 12:05 PM  
 ALT (SGPT) 12 12/20/2018 12:05 PM  
 
Lab Results Component Value Date/Time Hemoglobin A1c 5.1 03/07/2014 01:35 PM  
 Hemoglobin A1c 5.4 02/25/2013 02:09 PM  
  
Lab Results Component Value Date/Time Cholesterol, total 148 12/20/2018 12:05 PM  
 HDL Cholesterol 80 12/20/2018 12:05 PM  
 LDL, calculated 53 12/20/2018 12:05 PM  
 VLDL, calculated 15 12/20/2018 12:05 PM  
 Triglyceride 77 12/20/2018 12:05 PM  
  
 
 
ASSESSMENT/PLAN 
{There are no diagnoses linked to this encounter. (Refresh or delete this SmartLink)} Health Maintenance Due Topic Date Due  Shingrix Vaccine Age 50> (1 of 2) 06/19/1980  Lipid Screen  12/20/2019  Pneumococcal 65+ years (2 of 2 - PPSV23) 12/20/2019  Influenza Age 5 to Adult  08/01/2020 Reviewed plan of care. Patient has provided input and agrees with goals. The nurse provided the patient and/or family with advanced directive information if needed and encouraged the patient to provide a copy to the office when available.

## 2020-08-22 NOTE — PROGRESS NOTES
HPI  Ms. Valente Ochoa is a 80y.o. year old female, she is seen today for follow up HTN, GERD. BP at home has been up and down - 149-169/42-62. HR 58-74 on home log. Took tylenol this morning for right knee pain - better now. No chest pain or change in shortness of breath. Back pain persists - if severe will raise BP so will take tramadol with zofran. Rarely takes tramadol but it helps pain. Mild edema - chronic in left leg. Chief Complaint   Patient presents with    Hypertension        Prior to Admission medications    Medication Sig Start Date End Date Taking? Authorizing Provider   aspirin 81 mg chewable tablet Take 81 mg by mouth daily. Yes Provider, Historical   traMADoL (ULTRAM) 50 mg tablet Take 50 mg by mouth every six (6) hours as needed for Pain. Yes Provider, Historical   acetaminophen (Tylenol Extra Strength) 500 mg tablet Take  by mouth every six (6) hours as needed for Pain. Yes Provider, Historical   furosemide (LASIX) 20 mg tablet TAKE 1 TABLET DAILY FOR EDEMA 8/5/20  Yes Yulissa Heredia MD   losartan (COZAAR) 50 mg tablet TAKE 1 TABLET TWICE A DAY 7/2/20  Yes Hamilton Rosenberg MD   ondansetron (ZOFRAN ODT) 4 mg disintegrating tablet Take 1 Tab by mouth every eight (8) hours as needed for Nausea or Vomiting. 5/14/20  Yes Hamilton Rosenberg MD   famotidine (PEPCID) 20 mg tablet TAKE 1 TABLET NIGHTLY 3/19/20  Yes Hamilton Rosenberg MD   omeprazole (PRILOSEC) 20 mg capsule TAKE 1 CAPSULE DAILY 1/8/20  Yes Hamilton Rosenberg MD   BYSTOLIC 10 mg tablet TAKE 1 TABLET DAILY 1/8/20  Yes Hamilton Rosenberg MD   atorvastatin (LIPITOR) 20 mg tablet TAKE 1 TABLET DAILY 11/18/19  Yes Hamilton Rosenberg MD   amLODIPine (NORVASC) 5 mg tablet TAKE 1 TABLET DAILY 11/1/19  Yes Hamilton Rosenberg MD   chlorthalidone (HYGROTEN) 25 mg tablet TAKE ONE-HALF (1/2) TABLET DAILY 10/25/19  Yes Hamilton Rosenberg MD   celecoxib (CELEBREX) 200 mg capsule Take 200 mg by mouth daily.    Yes Provider, Historical   cholecalciferol, vitamin D3, (VITAMIN D3) 2,000 unit tab Take  by mouth daily. Provider, Historical         No Known Allergies      REVIEW OF SYSTEMS:  Per HPI    PHYSICAL EXAM:  Visit Vitals  /80   Pulse 69   Temp 98.2 °F (36.8 °C) (Oral)   Resp 16   Ht 4' 8\" (1.422 m)   Wt 129 lb 12.8 oz (58.9 kg)   SpO2 98%   BMI 29.10 kg/m²     Constitutional: Appears well-developed and well-nourished. No distress. HENT:   Head: Normocephalic and atraumatic. Eyes: No scleral icterus. Cardiovascular: Normal S1/S2, regular rhythm. No murmurs, rubs, or gallops. Pulmonary/Chest: Effort normal and breath sounds normal. No respiratory distress. No wheezes, rhonchi, or rales. Ext: No edema. Neurological: Alert. Psychiatric: Normal mood and affect. Behavior is normal.     Lab Results   Component Value Date/Time    Sodium 142 10/09/2019 02:45 AM    Potassium 5.2 10/09/2019 02:45 AM    Chloride 103 10/09/2019 02:45 AM    CO2 18 (L) 10/09/2019 02:45 AM    Anion gap 9 03/26/2014 05:00 AM    Glucose 90 10/09/2019 02:45 AM    BUN 27 10/09/2019 02:45 AM    Creatinine 1.22 (H) 10/09/2019 02:45 AM    BUN/Creatinine ratio 22 10/09/2019 02:45 AM    GFR est AA 45 (L) 10/09/2019 02:45 AM    GFR est non-AA 39 (L) 10/09/2019 02:45 AM    Calcium 9.6 10/09/2019 02:45 AM    Bilirubin, total 0.9 12/20/2018 12:05 PM    Alk.  phosphatase 104 12/20/2018 12:05 PM    Protein, total 7.4 12/20/2018 12:05 PM    Albumin 4.6 12/20/2018 12:05 PM    Globulin 3.3 03/12/2013 03:45 AM    A-G Ratio 1.6 12/20/2018 12:05 PM    ALT (SGPT) 12 12/20/2018 12:05 PM     Lab Results   Component Value Date/Time    Hemoglobin A1c 5.1 03/07/2014 01:35 PM    Hemoglobin A1c 5.4 02/25/2013 02:09 PM      Lab Results   Component Value Date/Time    Cholesterol, total 148 12/20/2018 12:05 PM    HDL Cholesterol 80 12/20/2018 12:05 PM    LDL, calculated 53 12/20/2018 12:05 PM    VLDL, calculated 15 12/20/2018 12:05 PM    Triglyceride 77 12/20/2018 12:05 PM ASSESSMENT/PLAN  Diagnoses and all orders for this visit:    1. Essential hypertension  -     METABOLIC PANEL, COMPREHENSIVE; Future  High but acceptable for age - continue current medications   2. Gastroesophageal reflux disease without esophagitis  Stable - continue current medications   3. Hyperlipidemia, unspecified hyperlipidemia type  -     LIPID PANEL; Future    4. Spinal stenosis of lumbar region without neurogenic claudication  Rare use tramadol - tylenol prn, continue current medications         Health Maintenance Due   Topic Date Due    Shingrix Vaccine Age 49> (1 of 2) 06/19/1980    Lipid Screen  12/20/2019    Pneumococcal 65+ years (2 of 2 - PPSV23) 12/20/2019        Follow-up and Dispositions    · Return in about 4 months (around 12/12/2020) for bp, pain, labs prior. Reviewed plan of care. Patient has provided input and agrees with goals. The nurse provided the patient and/or family with advanced directive information if needed and encouraged the patient to provide a copy to the office when available.

## 2020-10-29 NOTE — LETTER
11/27/2020 9:47 AM 
 
Ms. Vinicius Falk [address] [id number] Results for orders placed or performed in visit on 10/29/20 METABOLIC PANEL, COMPREHENSIVE Result Value Ref Range Glucose 96 65 - 99 mg/dL BUN 32 10 - 36 mg/dL Creatinine 1.54 (H) 0.57 - 1.00 mg/dL GFR est non-AA 29 (L) >59 mL/min/1.73 GFR est AA 34 (L) >59 mL/min/1.73  
 BUN/Creatinine ratio 21 12 - 28 Sodium 141 134 - 144 mmol/L Potassium 4.4 3.5 - 5.2 mmol/L Chloride 103 96 - 106 mmol/L  
 CO2 25 20 - 29 mmol/L Calcium 9.0 8.7 - 10.3 mg/dL Protein, total 7.0 6.0 - 8.5 g/dL Albumin 4.0 3.5 - 4.6 g/dL GLOBULIN, TOTAL 3.0 1.5 - 4.5 g/dL A-G Ratio 1.3 1.2 - 2.2 Bilirubin, total 0.7 0.0 - 1.2 mg/dL Alk. phosphatase 124 (H) 39 - 117 IU/L  
 AST (SGOT) 17 0 - 40 IU/L  
 ALT (SGPT) 9 0 - 32 IU/L  
LIPID PANEL Result Value Ref Range Cholesterol, total 153 100 - 199 mg/dL Triglyceride 89 0 - 149 mg/dL HDL Cholesterol 83 >39 mg/dL VLDL Cholesterol [place] 16 5 - 40 mg/dL LDL Chol Calc (NIH) 54 0 - 99 mg/dL RECOMMENDATIONS Kidney labs show you are slightly dehydrated - try to drink at least 2L fluids per day. I will recheck kidney labs in the future. Cholesterol is good! Sincerely, Vera Omalley MD

## 2020-10-30 ENCOUNTER — TELEPHONE (OUTPATIENT)
Dept: INTERNAL MEDICINE CLINIC | Age: 85
End: 2020-10-30

## 2020-10-30 LAB
ALBUMIN SERPL-MCNC: 4 G/DL (ref 3.5–4.6)
ALBUMIN/GLOB SERPL: 1.3 {RATIO} (ref 1.2–2.2)
ALP SERPL-CCNC: 124 IU/L (ref 39–117)
ALT SERPL-CCNC: 9 IU/L (ref 0–32)
AST SERPL-CCNC: 17 IU/L (ref 0–40)
BILIRUB SERPL-MCNC: 0.7 MG/DL (ref 0–1.2)
BUN SERPL-MCNC: 32 MG/DL (ref 10–36)
BUN/CREAT SERPL: 21 (ref 12–28)
CALCIUM SERPL-MCNC: 9 MG/DL (ref 8.7–10.3)
CHLORIDE SERPL-SCNC: 103 MMOL/L (ref 96–106)
CHOLEST SERPL-MCNC: 153 MG/DL (ref 100–199)
CO2 SERPL-SCNC: 25 MMOL/L (ref 20–29)
CREAT SERPL-MCNC: 1.54 MG/DL (ref 0.57–1)
GLOBULIN SER CALC-MCNC: 3 G/DL (ref 1.5–4.5)
GLUCOSE SERPL-MCNC: 96 MG/DL (ref 65–99)
HDLC SERPL-MCNC: 83 MG/DL
LDLC SERPL CALC-MCNC: 54 MG/DL (ref 0–99)
POTASSIUM SERPL-SCNC: 4.4 MMOL/L (ref 3.5–5.2)
PROT SERPL-MCNC: 7 G/DL (ref 6–8.5)
SODIUM SERPL-SCNC: 141 MMOL/L (ref 134–144)
TRIGL SERPL-MCNC: 89 MG/DL (ref 0–149)
VLDLC SERPL CALC-MCNC: 16 MG/DL (ref 5–40)

## 2020-10-30 NOTE — TELEPHONE ENCOUNTER
I called the patient's daughter Yasemin a[place] on [employer]) and verified them by name and date of birth. She is expericing a ear ache that started last night. The patient has an hearing aid and the phone presses against it. The daughter and other family members have had a sore throat with no fevers and thinks it is related to sinuses. They wanted to know if Dr. Amanda Sterling could either call something in or suggest something over the counter.

## 2020-10-30 NOTE — TELEPHONE ENCOUNTER
Pt's daughter, Jeremias Lora, called because her mother has an ear ache that developed late last night. Had flu vaccine about a week ago, family members with sore throat, no other symptoms. Please return call at [phone] to discuss-open to rx being called in or OTC recommendations b/c no soon appt available.

## 2020-10-30 NOTE — TELEPHONE ENCOUNTER
Verified patients name and date of birth. Advised [place](on [employer]) of recommndations per PCP. She stated understanding.

## 2020-11-03 DIAGNOSIS — I10 ESSENTIAL HYPERTENSION: ICD-10-CM

## 2020-11-03 RX ORDER — FUROSEMIDE 20 MG/1
20 TABLET ORAL DAILY
Qty: 90 TAB | Refills: 0 | Status: SHIPPED | OUTPATIENT
Start: 2020-11-03 | End: 2021-02-01

## 2020-11-03 NOTE — TELEPHONE ENCOUNTER
PCP: Oneyda Velez MD     Last appt: 8/12/2020   Future Appointments   Date Time Provider Faisal Flores   12/14/2020  1:30 PM Oneyda Velez MD [facility] AMB        Requested Prescriptions     Pending Prescriptions Disp Refills    furosemide (LASIX) 20 mg tablet 90 Tab 0

## 2020-11-03 NOTE — TELEPHONE ENCOUNTER
[facility] on file sent a fax requesting a refill of   Requested Prescriptions     Pending Prescriptions Disp Refills    furosemide (LASIX) 20 mg tablet 90 Tab 0

## 2020-11-14 ENCOUNTER — PATIENT MESSAGE (OUTPATIENT)
Dept: INTERNAL MEDICINE CLINIC | Age: 85
End: 2020-11-14

## 2020-11-17 RX ORDER — CELECOXIB 200 MG/1
200 CAPSULE ORAL DAILY
Qty: 90 CAP | Refills: 1 | Status: SHIPPED | OUTPATIENT
Start: 2020-11-17 | End: 2021-05-17

## 2020-11-17 NOTE — TELEPHONE ENCOUNTER
REFILL     PCP: Belinda Vasques MD     Last appt: 8/12/2020   Future Appointments   Date Time Provider Faisal Flores   12/14/2020  1:30 PM Belinda Vasques MD [facility] BS AMB        Requested Prescriptions     Pending Prescriptions Disp Refills    celecoxib (CeleBREX) 200 mg capsule        Sig: Take 1 Cap by mouth daily. Take 200 mg by mouth daily.

## 2020-11-17 NOTE — TELEPHONE ENCOUNTER
----- Message from Justin Cleary sent at 11/16/2020  6:49 PM EST -----  Regarding: RE: Prescription Question  Contact: [phone]  No she is not seeing an orthopedic doctor and Dr Arcelia Miller was aware of this medication and told her to keep taking it.   Thank you

## 2020-12-14 ENCOUNTER — OFFICE VISIT (OUTPATIENT)
Dept: INTERNAL MEDICINE CLINIC | Age: 85
End: 2020-12-14
Payer: MEDICARE

## 2020-12-14 VITALS
WEIGHT: 130 LBS | HEART RATE: 62 BPM | DIASTOLIC BLOOD PRESSURE: 80 MMHG | OXYGEN SATURATION: 98 % | RESPIRATION RATE: 16 BRPM | TEMPERATURE: 97.8 F | HEIGHT: 56 IN | BODY MASS INDEX: 29.25 KG/M2 | SYSTOLIC BLOOD PRESSURE: 130 MMHG

## 2020-12-14 DIAGNOSIS — Z13.31 SCREENING FOR DEPRESSION: ICD-10-CM

## 2020-12-14 DIAGNOSIS — M48.061 SPINAL STENOSIS OF LUMBAR REGION WITHOUT NEUROGENIC CLAUDICATION: ICD-10-CM

## 2020-12-14 DIAGNOSIS — K21.9 GASTROESOPHAGEAL REFLUX DISEASE WITHOUT ESOPHAGITIS: ICD-10-CM

## 2020-12-14 DIAGNOSIS — I10 ESSENTIAL HYPERTENSION: ICD-10-CM

## 2020-12-14 DIAGNOSIS — Z00.00 MEDICARE ANNUAL WELLNESS VISIT, SUBSEQUENT: Primary | ICD-10-CM

## 2020-12-14 PROCEDURE — 99214 OFFICE O/P EST MOD 30 MIN: CPT | Performed by: INTERNAL MEDICINE

## 2020-12-14 PROCEDURE — G0439 PPPS, SUBSEQ VISIT: HCPCS | Performed by: INTERNAL MEDICINE

## 2020-12-14 PROCEDURE — 1090F PRES/ABSN URINE INCON ASSESS: CPT | Performed by: INTERNAL MEDICINE

## 2020-12-14 PROCEDURE — G8536 NO DOC ELDER MAL SCRN: HCPCS | Performed by: INTERNAL MEDICINE

## 2020-12-14 PROCEDURE — G8510 SCR DEP NEG, NO PLAN REQD: HCPCS | Performed by: INTERNAL MEDICINE

## 2020-12-14 PROCEDURE — G8427 DOCREV CUR MEDS BY ELIG CLIN: HCPCS | Performed by: INTERNAL MEDICINE

## 2020-12-14 PROCEDURE — G8419 CALC BMI OUT NRM PARAM NOF/U: HCPCS | Performed by: INTERNAL MEDICINE

## 2020-12-14 PROCEDURE — 1101F PT FALLS ASSESS-DOCD LE1/YR: CPT | Performed by: INTERNAL MEDICINE

## 2020-12-14 NOTE — PROGRESS NOTES
HPI  Ms. Bharti Doe is a 80y.o. year old female, she is seen today for follow up HTN, chronic back pain, AWV. BP at home has been 149-172/37-57, HR 55-63. Back pain has been stable - some days worse than others especially in rainy weather. Has taken tramadol which helps pain. Rarely takes it. No chest pain, sob, dizziness, weakness. GERD controlled with omeprazole and famotidine. Last labs showed mildly worsening creatinine - since has increased fluid intake - drinking less coffee and more non-caffeinated drinks. Chief Complaint   Patient presents with    Hypertension     4 month follow-up    Pain (Chronic)     ongoing; occassional; back        Prior to Admission medications    Medication Sig Start Date End Date Taking? Authorizing Provider   celecoxib (CeleBREX) 200 mg capsule Take 1 Cap by mouth daily. Take 200 mg by mouth daily. 11/17/20  Yes Belinda Vasques MD   furosemide (LASIX) 20 mg tablet Take 1 Tab by mouth daily. 11/3/20  Yes Belinda Vasques MD   aspirin 81 mg chewable tablet Take 81 mg by mouth daily. Yes Provider, Historical   traMADoL (ULTRAM) 50 mg tablet Take 50 mg by mouth every six (6) hours as needed for Pain. Yes Provider, Historical   acetaminophen (Tylenol Extra Strength) 500 mg tablet Take  by mouth every six (6) hours as needed for Pain.    Yes Provider, Historical   losartan (COZAAR) 50 mg tablet TAKE 1 TABLET TWICE A DAY 7/2/20  Yes Belinda Vasques MD   ondansetron (ZOFRAN ODT) 4 mg disintegrating tablet Take 1 Tab by mouth every eight (8) hours as needed for Nausea or Vomiting. 5/14/20  Yes Belinda Vasques MD   famotidine (PEPCID) 20 mg tablet TAKE 1 TABLET NIGHTLY 3/19/20  Yes Belinda Vasques MD   omeprazole (PRILOSEC) 20 mg capsule TAKE 1 CAPSULE DAILY 1/8/20  Yes Belinda Vasques MD   BYSTOLIC 10 mg tablet TAKE 1 TABLET DAILY 1/8/20  Yes Belinda Vasques MD   atorvastatin (LIPITOR) 20 mg tablet TAKE 1 TABLET DAILY 11/18/19  Yes Claudio Gates MD   amLODIPine (NORVASC) 5 mg tablet TAKE 1 TABLET DAILY 11/1/19  Yes Claudio Gates MD   chlorthalidone (HYGROTEN) 25 mg tablet TAKE ONE-HALF (1/2) TABLET DAILY 10/25/19  Yes Claudio Gates MD   cholecalciferol, vitamin D3, (VITAMIN D3) 2,000 unit tab Take  by mouth daily. Yes Provider, Historical         No Known Allergies      REVIEW OF SYSTEMS:  Per HPI    PHYSICAL EXAM:  Visit Vitals  /80   Pulse 62   Temp 97.8 °F (36.6 °C) (Oral)   Resp 16   Ht 4' 8\" (1.422 m)   Wt 130 lb (59 kg)   SpO2 98%   BMI 29.15 kg/m²     Constitutional: Appears well-developed and well-nourished. No distress. HENT:   Head: Normocephalic and atraumatic. Eyes: No scleral icterus. Neck: no lad, no tm, supple   Cardiovascular: Normal S1/S2, regular rhythm. No murmurs, rubs, or gallops. Pulmonary/Chest: Effort normal and breath sounds normal. No respiratory distress. No wheezes, rhonchi, or rales. Ext: No edema. Neurological: Alert. Psychiatric: Normal mood and affect. Behavior is normal.     Lab Results   Component Value Date/Time    Sodium 141 10/29/2020 01:29 PM    Potassium 4.4 10/29/2020 01:29 PM    Chloride 103 10/29/2020 01:29 PM    CO2 25 10/29/2020 01:29 PM    Anion gap 9 03/26/2014 05:00 AM    Glucose 96 10/29/2020 01:29 PM    BUN 32 10/29/2020 01:29 PM    Creatinine 1.54 (H) 10/29/2020 01:29 PM    BUN/Creatinine ratio 21 10/29/2020 01:29 PM    GFR est AA 34 (L) 10/29/2020 01:29 PM    GFR est non-AA 29 (L) 10/29/2020 01:29 PM    Calcium 9.0 10/29/2020 01:29 PM    Bilirubin, total 0.7 10/29/2020 01:29 PM    Alk.  phosphatase 124 (H) 10/29/2020 01:29 PM    Protein, total 7.0 10/29/2020 01:29 PM    Albumin 4.0 10/29/2020 01:29 PM    Globulin 3.3 03/12/2013 03:45 AM    A-G Ratio 1.3 10/29/2020 01:29 PM    ALT (SGPT) 9 10/29/2020 01:29 PM     Lab Results   Component Value Date/Time    Hemoglobin A1c 5.1 03/07/2014 01:35 PM    Hemoglobin A1c 5.4 02/25/2013 02:09 PM      Lab Results Component Value Date/Time    Cholesterol, total 153 10/29/2020 01:29 PM    HDL Cholesterol 83 10/29/2020 01:29 PM    LDL, calculated 53 12/20/2018 12:05 PM    LDL Chol Calc (NIH) 54 10/29/2020 01:29 PM    VLDL, calculated 15 12/20/2018 12:05 PM    VLDL Cholesterol Aldo 16 10/29/2020 01:29 PM    Triglyceride 89 10/29/2020 01:29 PM          ASSESSMENT/PLAN  Diagnoses and all orders for this visit:    1. Medicare annual wellness visit, subsequent    2. Essential hypertension  -     METABOLIC PANEL, BASIC; Future  Controlled on current regimen, continue   3. Spinal stenosis of lumbar region without neurogenic claudication  Tramadol prn, rarely takes  4. Gastroesophageal reflux disease without esophagitis  Controlled on current regimen, continue   5. Screening for depression  -     [address] Maintenance Due   Topic Date Due    Shingrix Vaccine Age 49> (1 of 2) 06/19/1980    Pneumococcal 65+ years (2 of 2 - PPSV23) 12/20/2019    Medicare Yearly Exam  10/09/2020        Follow-up and Dispositions    · Return in about 4 months (around 4/14/2021) for bp, pain, in person. Reviewed plan of care. Patient has provided input and agrees with goals. The nurse provided the patient and/or family with advanced directive information if needed and encouraged the patient to provide a copy to the office when available. This is the Subsequent Medicare Annual Wellness Exam, performed 12 months or more after the Initial AWV or the last Subsequent AWV    I have reviewed the patient's medical history in detail and updated the computerized patient record.      Depression Risk Factor Screening:     3 most recent PHQ Screens 12/14/2020   Little interest or pleasure in doing things Not at all   Feeling down, depressed, irritable, or hopeless Not at all   Total Score PHQ 2 0       Alcohol Risk Screen   Do you average more than 1 drink per night or more than 7 drinks a week:  No    On any one occasion in the past three months have you have had more than 3 drinks containing alcohol:  No        Functional Ability and Level of Safety:   Hearing: Hearing is good. Activities of Daily Living: The home contains: handrails and grab bars  Patient needs help with:  transportation and housework     Ambulation: with mild difficulty     Fall Risk:  Fall Risk Assessment, last 12 mths 12/14/2020   Able to walk? Yes   Fall in past 12 months? No     Abuse Screen:  Patient is not abused       Cognitive Screening   Has your family/caregiver stated any concerns about your memory: no     Cognitive Screening: N/A    Assessment/Plan   Education and counseling provided:  Are appropriate based on today's review and evaluation    Diagnoses and all orders for this visit:    1. Medicare annual wellness visit, subsequent    2. Essential hypertension  -     METABOLIC PANEL, BASIC; Future    3. Spinal stenosis of lumbar region without neurogenic claudication    4. Gastroesophageal reflux disease without esophagitis    5.  Screening for depression  -     [facility]     [facility] Maintenance Due   Topic Date Due    Shingrix Vaccine Age 49> (1 of 2) 06/19/1980    Pneumococcal 65+ years (2 of 2 - PPSV23) 12/20/2019    Medicare Yearly Exam  10/09/2020       Patient Care Team   Patient Care Team:  Chan Combs MD as PCP - General (Internal Medicine)  Chan Combs MD as PCP - REHABILITATION [facility] Empaneled Provider    History     Patient Active Problem List   Diagnosis Code    Spinal stenosis M48.00    TIA (transient ischemic attack) G45.9    Age-related osteoporosis without current pathological fracture M81.0    Gastroesophageal reflux disease without esophagitis K21.9    Essential hypertension I10    Lumbar spinal stenosis M48.061     Past Medical History:   Diagnosis Date    Arthritis     Beta-blocker therapy     Chronic pain     BACK    GERD (gastroesophageal reflux disease)     History of blood transfusion 3/2013    [facility], NO REACTION    Hypertension     Osteoporosis     Thromboembolus ([address]     RT. GROIN    Ulcerative colitis ([facility] [address]     no symptoms since 1980s      Past Surgical History:   Procedure Laterality Date    DILATE ESOPHAGUS  11/10/2015         HX CATARACT REMOVAL  1990'S    BILATERAL    HX GI      COLONOSCOPY    HX LUMBAR FUSION  3/2013    T10-S1 - Dr. Cornelious Lesches and Dr. Wu Pair    HX TONSILLECTOMY      HX TUBAL LIGATION      UPPER GI ENDOSCOPY,BIOPSY  11/10/2015          Current Outpatient Medications   Medication Sig Dispense Refill    celecoxib (CeleBREX) 200 mg capsule Take 1 Cap by mouth daily. Take 200 mg by mouth daily. 90 Cap 1    furosemide (LASIX) 20 mg tablet Take 1 Tab by mouth daily. 90 Tab 0    aspirin 81 mg chewable tablet Take 81 mg by mouth daily.  traMADoL (ULTRAM) 50 mg tablet Take 50 mg by mouth every six (6) hours as needed for Pain.  acetaminophen (Tylenol Extra Strength) 500 mg tablet Take  by mouth every six (6) hours as needed for Pain.  losartan (COZAAR) 50 mg tablet TAKE 1 TABLET TWICE A  Tab 3    ondansetron (ZOFRAN ODT) 4 mg disintegrating tablet Take 1 Tab by mouth every eight (8) hours as needed for Nausea or Vomiting. 20 Tab 0    famotidine (PEPCID) 20 mg tablet TAKE 1 TABLET NIGHTLY 90 Tab 3    omeprazole (PRILOSEC) 20 mg capsule TAKE 1 CAPSULE DAILY 90 Cap 4    BYSTOLIC 10 mg tablet TAKE 1 TABLET DAILY 90 Tab 4    atorvastatin (LIPITOR) 20 mg tablet TAKE 1 TABLET DAILY 90 Tab 4    amLODIPine (NORVASC) 5 mg tablet TAKE 1 TABLET DAILY 90 Tab 4    chlorthalidone (HYGROTEN) 25 mg tablet TAKE ONE-HALF (1/2) TABLET DAILY 45 Tab 4    cholecalciferol, vitamin D3, (VITAMIN D3) 2,000 unit tab Take  by mouth daily.        No Known Allergies    Family History   Problem Relation Age of Onset    Heart Disease Mother     Hypertension Mother     Cancer Father         UNKNOWN    Liver Disease Sister    [facility] Cancer Brother         UNKNOWN    Diabetes Daughter     Hypertension Daughter     Diabetes Son    Alaina Exon Hypertension Son    Alaina Exon Diabetes Son     Liver Disease Son     Hypertension Son     Diabetes Son     Other Sister         SCLERODERMA    Anesth Problems Neg Hx      Social History     Tobacco Use    Smoking status: Never Smoker    Smokeless tobacco: Never Used   Substance Use Topics    Alcohol use:  No

## 2020-12-14 NOTE — PROGRESS NOTES
Room: 2B    Identified pt with two pt identifiers(name and ). Reviewed record in preparation for visit and have obtained necessary documentation. All patient medications has been reviewed. Chief Complaint   Patient presents with    Hypertension     4 month follow-up    Pain (Chronic)     ongoing; occassional; back       Health Maintenance Due   Topic    Shingrix Vaccine Age 49> (1 of 2)    Pneumococcal 65+ years (2 of 2 - PPSV23)    Medicare Yearly Exam        Vitals:    20 1338   BP: (!) 153/60   Pulse: 62   Resp: 16   Temp: 97.8 °F (36.6 °C)   TempSrc: Oral   SpO2: 98%   Weight: 130 lb (59 kg)   Height: 4' 8\" (1.422 m)   PainSc:   4   PainLoc: Back       4. Have you been to the ER, urgent care clinic since your last visit? Hospitalized since your last visit? No    5. Have you seen or consulted any other health care providers outside of the [address] [place] [address] since your last visit? Include any pap smears or colon screening. No    Patient is accompanied by self I have received verbal consent from [address] [place] [address] to discuss any/all medical information while they are present in the room.

## 2020-12-14 NOTE — PATIENT INSTRUCTIONS
Medicare Wellness Visit, Female The best way to live healthy is to have a lifestyle where you eat a well-balanced diet, exercise regularly, limit alcohol use, and quit all forms of tobacco/nicotine, if applicable. Regular preventive services are another way to keep healthy. Preventive services (vaccines, screening tests, monitoring & exams) can help personalize your care plan, which helps you manage your own care. Screening tests can find health problems at the earliest stages, when they are easiest to treat. [facility] follows the current, evidence-based guidelines published by the [employer] ([facility]) when recommending preventive services for our patients. Because we follow these guidelines, sometimes recommendations change over time as research supports it. (For example, mammograms used to be recommended annually. Even though Medicare will still pay for an annual mammogram, the newer guidelines recommend a mammogram every two years for women of average risk). Of course, you and your doctor may decide to screen more often for some diseases, based on your risk and your co-morbidities (chronic disease you are already diagnosed with). Preventive services for you include: - Medicare offers their members a free annual wellness visit, which is time for you and your primary care provider to discuss and plan for your preventive service needs. Take advantage of this benefit every year! 
-All adults over the age of 72 should receive the recommended pneumonia vaccines. Current USPSTF guidelines recommend a series of two vaccines for the best pneumonia protection.  
-All adults should have a flu vaccine yearly and a tetanus vaccine every 10 years.  
-All adults age 48 and older should receive the shingles vaccines (series of two vaccines). -All adults age 38-68 who are overweight should have a diabetes screening test once every three years. -All adults born between [birth year] and 1965 should be screened once for Hepatitis C. 
-Other screening tests and preventive services for persons with diabetes include: an eye exam to screen for diabetic retinopathy, a kidney function test, a foot exam, and stricter control over your cholesterol.  
-Cardiovascular screening for adults with routine risk involves an electrocardiogram (ECG) at intervals determined by your doctor.  
-Colorectal cancer screenings should be done for adults age 54-65 with no increased risk factors for colorectal cancer. There are a number of acceptable methods of screening for this type of cancer. Each test has its own benefits and drawbacks. Discuss with your doctor what is most appropriate for you during your annual wellness visit. The different tests include: colonoscopy (considered the best screening method), a fecal occult blood test, a fecal DNA test, and sigmoidoscopy. 
 
-A bone mass density test is recommended when a woman turns 65 to screen for osteoporosis. This test is only recommended one time, as a screening. Some providers will use this same test as a disease monitoring tool if you already have osteoporosis. -Breast cancer screenings are recommended every other year for women of normal risk, age 54-69. 
-Cervical cancer screenings for women over age 72 are only recommended with certain risk factors. Here is a list of your current Health Maintenance items (your personalized list of preventive services) with a due date: 
Health Maintenance Due Topic Date Due  Shingles Vaccine (1 of 2) 06/19/1980  Pneumococcal Vaccine (2 of 2 - PPSV23) 12/20/2019 [employer] Annual Well Visit  10/09/2020

## 2021-01-17 DIAGNOSIS — I10 ESSENTIAL HYPERTENSION: ICD-10-CM

## 2021-01-17 RX ORDER — CHLORTHALIDONE 25 MG/1
TABLET ORAL
Qty: 45 TAB | Refills: 3 | Status: SHIPPED | OUTPATIENT
Start: 2021-01-17 | End: 2022-01-12

## 2021-01-24 DIAGNOSIS — I10 ESSENTIAL HYPERTENSION: ICD-10-CM

## 2021-01-24 RX ORDER — AMLODIPINE BESYLATE 5 MG/1
TABLET ORAL
Qty: 90 TAB | Refills: 3 | Status: SHIPPED | OUTPATIENT
Start: 2021-01-24 | End: 2022-01-19

## 2021-02-01 DIAGNOSIS — I10 ESSENTIAL HYPERTENSION: ICD-10-CM

## 2021-02-01 RX ORDER — FUROSEMIDE 20 MG/1
TABLET ORAL
Qty: 90 TAB | Refills: 3 | Status: SHIPPED | OUTPATIENT
Start: 2021-02-01 | End: 2022-01-27

## 2021-02-08 DIAGNOSIS — G45.9 TRANSIENT CEREBRAL ISCHEMIA, UNSPECIFIED TYPE: ICD-10-CM

## 2021-02-08 RX ORDER — ATORVASTATIN CALCIUM 20 MG/1
TABLET, FILM COATED ORAL
Qty: 90 TAB | Refills: 3 | Status: SHIPPED | OUTPATIENT
Start: 2021-02-08 | End: 2022-02-03

## 2021-03-03 NOTE — LETTER
3/9/2021 8:03 AM    Ms. Brandie Diez   [address] [facility] [id number]    Results for orders placed or performed in visit on 43/24/09   METABOLIC PANEL, BASIC   Result Value Ref Range    Glucose 91 65 - 99 mg/dL    BUN 27 10 - 36 mg/dL    Creatinine 1.06 (H) 0.57 - 1.00 mg/dL    GFR est non-AA 46 (L) >59 mL/min/1.73    GFR est AA 53 (L) >59 mL/min/1.73    BUN/Creatinine ratio 25 12 - 28    Sodium 143 134 - 144 mmol/L    Potassium 4.8 3.5 - 5.2 mmol/L    Chloride 106 96 - 106 mmol/L    CO2 24 20 - 29 mmol/L    Calcium 9.4 8.7 - 10.3 mg/dL       RECOMMENDATIONS  Kidney function improved          Sincerely,      Kym Johnson MD

## 2021-03-04 LAB
BUN SERPL-MCNC: 27 MG/DL (ref 10–36)
BUN/CREAT SERPL: 25 (ref 12–28)
CALCIUM SERPL-MCNC: 9.4 MG/DL (ref 8.7–10.3)
CHLORIDE SERPL-SCNC: 106 MMOL/L (ref 96–106)
CO2 SERPL-SCNC: 24 MMOL/L (ref 20–29)
CREAT SERPL-MCNC: 1.06 MG/DL (ref 0.57–1)
GLUCOSE SERPL-MCNC: 91 MG/DL (ref 65–99)
POTASSIUM SERPL-SCNC: 4.8 MMOL/L (ref 3.5–5.2)
SODIUM SERPL-SCNC: 143 MMOL/L (ref 134–144)

## 2021-03-14 DIAGNOSIS — K21.9 GASTROESOPHAGEAL REFLUX DISEASE WITHOUT ESOPHAGITIS: ICD-10-CM

## 2021-03-14 RX ORDER — FAMOTIDINE 20 MG/1
TABLET, FILM COATED ORAL
Qty: 90 TAB | Refills: 3 | Status: SHIPPED | OUTPATIENT
Start: 2021-03-14 | End: 2022-02-14

## 2021-04-02 DIAGNOSIS — K21.9 GASTROESOPHAGEAL REFLUX DISEASE WITHOUT ESOPHAGITIS: ICD-10-CM

## 2021-04-02 RX ORDER — OMEPRAZOLE 20 MG/1
CAPSULE, DELAYED RELEASE ORAL
Qty: 90 CAP | Refills: 3 | Status: SHIPPED | OUTPATIENT
Start: 2021-04-02 | End: 2022-03-28

## 2021-04-05 DIAGNOSIS — I10 ESSENTIAL HYPERTENSION: Primary | ICD-10-CM

## 2021-04-05 RX ORDER — NEBIVOLOL 10 MG/1
TABLET ORAL
Qty: 90 TAB | Refills: 1 | Status: SHIPPED | OUTPATIENT
Start: 2021-04-05 | End: 2021-10-01

## 2021-04-05 NOTE — TELEPHONE ENCOUNTER
PCP: Neelima Ambrocio MD    Last appt: 12/14/2020  Future Appointments   Date Time Provider Faisal Flores   4/21/2021  1:30 PM Neelima Ambrocio MD [facility] AMB       Requested Prescriptions     Pending Prescriptions Disp Refills    nebivoloL (Bystolic) 10 mg tablet 90 Tab 4     Sig: TAKE 1 TABLET DAILY

## 2021-04-05 NOTE — TELEPHONE ENCOUNTER
Requested Prescriptions     Pending Prescriptions Disp Refills    nebivoloL (Bystolic) 10 mg tablet 90 Tab 4     Pharmacy needs 90  days of RX.

## 2021-04-21 ENCOUNTER — OFFICE VISIT (OUTPATIENT)
Dept: INTERNAL MEDICINE CLINIC | Age: 86
End: 2021-04-21
Payer: MEDICARE

## 2021-04-21 VITALS
HEART RATE: 63 BPM | TEMPERATURE: 98 F | OXYGEN SATURATION: 95 % | BODY MASS INDEX: 29.2 KG/M2 | WEIGHT: 129.8 LBS | DIASTOLIC BLOOD PRESSURE: 70 MMHG | SYSTOLIC BLOOD PRESSURE: 130 MMHG | RESPIRATION RATE: 16 BRPM | HEIGHT: 56 IN

## 2021-04-21 DIAGNOSIS — K21.9 GASTROESOPHAGEAL REFLUX DISEASE WITHOUT ESOPHAGITIS: ICD-10-CM

## 2021-04-21 DIAGNOSIS — M48.061 SPINAL STENOSIS OF LUMBAR REGION WITHOUT NEUROGENIC CLAUDICATION: ICD-10-CM

## 2021-04-21 DIAGNOSIS — I10 ESSENTIAL HYPERTENSION: Primary | ICD-10-CM

## 2021-04-21 PROCEDURE — G8427 DOCREV CUR MEDS BY ELIG CLIN: HCPCS | Performed by: INTERNAL MEDICINE

## 2021-04-21 PROCEDURE — G8419 CALC BMI OUT NRM PARAM NOF/U: HCPCS | Performed by: INTERNAL MEDICINE

## 2021-04-21 PROCEDURE — G8510 SCR DEP NEG, NO PLAN REQD: HCPCS | Performed by: INTERNAL MEDICINE

## 2021-04-21 PROCEDURE — 1090F PRES/ABSN URINE INCON ASSESS: CPT | Performed by: INTERNAL MEDICINE

## 2021-04-21 PROCEDURE — 1101F PT FALLS ASSESS-DOCD LE1/YR: CPT | Performed by: INTERNAL MEDICINE

## 2021-04-21 PROCEDURE — G8536 NO DOC ELDER MAL SCRN: HCPCS | Performed by: INTERNAL MEDICINE

## 2021-04-21 PROCEDURE — 99214 OFFICE O/P EST MOD 30 MIN: CPT | Performed by: INTERNAL MEDICINE

## 2021-04-21 RX ORDER — TRAMADOL HYDROCHLORIDE 50 MG/1
50 TABLET ORAL
Qty: 30 TAB | Refills: 0 | Status: SHIPPED | OUTPATIENT
Start: 2021-04-21 | End: 2022-07-29 | Stop reason: SDUPTHER

## 2021-04-21 NOTE — PROGRESS NOTES
Juliane Bui  Identified pt with two pt identifiers(name and ). Chief Complaint   Patient presents with    Hypertension    Pain (Chronic)       Reviewed record In preparation for visit and have obtained necessary documentation. 1. Have you been to the ER, urgent care clinic or hospitalized since your last visit? No     2. Have you seen or consulted any other health care providers outside of the [address] [place] [address] since your last visit? Include any pap smears or colon screening. No    Patient has an advance directive. Vitals reviewed with provider.     Health Maintenance reviewed:     Health Maintenance Due   Topic    Shingrix Vaccine Age 49> (1 of 2)    Pneumococcal 65+ years (2 of 2 - PPSV23)    COVID-19 Vaccine (2 - Moderna 2-dose series)          Wt Readings from Last 3 Encounters:   21 129 lb 12.8 oz (58.9 kg)   20 130 lb (59 kg)   20 129 lb 12.8 oz (58.9 kg)        Temp Readings from Last 3 Encounters:   21 98 °F (36.7 °C) (Oral)   20 97.8 °F (36.6 °C) (Oral)   20 98.2 °F (36.8 °C) (Oral)        BP Readings from Last 3 Encounters:   21 (!) 186/72   20 130/80   20 164/80        Pulse Readings from Last 3 Encounters:   21 63   20 62   20 69        Vitals:    21 1319   BP: (!) 186/72   Pulse: 63   Resp: 16   Temp: 98 °F (36.7 °C)   TempSrc: Oral   SpO2: 95%   Weight: 129 lb 12.8 oz (58.9 kg)   Height: 4' 8\" (1.422 m)   PainSc:   0 - No pain          Learning Assessment:   :       Learning Assessment 2019   PRIMARY LEARNER Patient   PRIMARY LANGUAGE ENGLISH   LEARNER PREFERENCE PRIMARY READING   ANSWERED BY patient   RELATIONSHIP SELF        Depression Screening:   :       3 most recent PHQ Screens 2021   Little interest or pleasure in doing things Not at all   Feeling down, depressed, irritable, or hopeless Not at all   Total Score PHQ 2 0        Fall Risk Assessment:   :       Fall Risk Assessment, last 12 mths 12/14/2020   Able to walk? Yes   Fall in past 12 months? No        Abuse Screening:   :       Abuse Screening Questionnaire 1/13/2020 10/9/2019 2/15/2018   Do you ever feel afraid of your partner? N N N   Are you in a relationship with someone who physically or mentally threatens you? N N N   Is it safe for you to go home?  Y Y Y        ADL Screening:   :       ADL Assessment 2/15/2018   Feeding yourself No Help Needed   Getting from bed to chair No Help Needed   Getting dressed No Help Needed   Bathing or showering No Help Needed   Walk across the room (includes cane/walker) No Help Needed   Using the telphone No Help Needed   Taking your medications No Help Needed   Preparing meals No Help Needed   Managing money (expenses/bills) No Help Needed   Moderately strenuous housework (laundry) No Help Needed   Shopping for personal items (toiletries/medicines) No Help Needed   Shopping for groceries Help Needed   Driving Help Needed   Climbing a flight of stairs No Help Needed   Getting to places beyond walking distances Help Needed

## 2021-04-21 NOTE — PROGRESS NOTES
HPI  Ms. Griselda Beach is a 80y.o. year old female, she is seen today for follow up HTN, chronic pain. Has been doing pretty well. BP at home has been 142-169/43-61, HR 53-67    No chest pain, sob, dizziness, weakness, HA, vision changes. No edema other than chronic in left ankle. Chronic pain in back, around front of torso, will take 1/2 tramadol about 3x per week which helps pain. Notes BP is higher with pain, better after taking tramadol. Last filled 5/2020. No heartburn or indigestion as long as she takes omeprazole and famotidine. Chief Complaint   Patient presents with    Hypertension    Pain (Chronic)        Prior to Admission medications    Medication Sig Start Date End Date Taking? Authorizing Provider   traMADoL (ULTRAM) 50 mg tablet Take 1 Tab by mouth every six (6) hours as needed for Pain for up to 7 days. Max Daily Amount: 200 mg. 4/21/21 4/28/21 Yes Storm Cherry MD   nebivoloL (Bystolic) 10 mg tablet TAKE 1 TABLET DAILY 4/5/21  Yes Hilary Rodas MD   omeprazole (PRILOSEC) 20 mg capsule TAKE 1 CAPSULE DAILY 4/2/21  Yes Storm Cherry MD   famotidine (PEPCID) 20 mg tablet TAKE 1 TABLET NIGHTLY 3/14/21  Yes Storm Cherry MD   atorvastatin (LIPITOR) 20 mg tablet TAKE 1 TABLET DAILY 2/8/21  Yes Storm Cherry MD   furosemide (LASIX) 20 mg tablet TAKE 1 TABLET DAILY 2/1/21  Yes Storm Cherry MD   amLODIPine (NORVASC) 5 mg tablet TAKE 1 TABLET DAILY 1/24/21  Yes Storm Cherry MD   chlorthalidone (HYGROTON) 25 mg tablet TAKE ONE-HALF (1/2) TABLET DAILY 1/17/21  Yes Storm Cherry MD   celecoxib (CeleBREX) 200 mg capsule Take 1 Cap by mouth daily. Take 200 mg by mouth daily. 11/17/20  Yes Storm Cherry MD   aspirin 81 mg chewable tablet Take 81 mg by mouth daily. Yes Provider, Historical   acetaminophen (Tylenol Extra Strength) 500 mg tablet Take  by mouth every six (6) hours as needed for Pain.    Yes Provider, Historical   losartan (COZAAR) 50 mg tablet TAKE 1 TABLET TWICE A DAY 7/2/20  Yes Pattie Bay MD   ondansetron (ZOFRAN ODT) 4 mg disintegrating tablet Take 1 Tab by mouth every eight (8) hours as needed for Nausea or Vomiting. 5/14/20  Yes Pattie Bay MD   cholecalciferol, vitamin D3, (VITAMIN D3) 2,000 unit tab Take  by mouth daily. Yes Provider, Historical   traMADoL (ULTRAM) 50 mg tablet Take 50 mg by mouth every six (6) hours as needed for Pain.  4/21/21  Provider, Historical         No Known Allergies      REVIEW OF SYSTEMS:  Per HPI    PHYSICAL EXAM:  Visit Vitals  /70   Pulse 63   Temp 98 °F (36.7 °C) (Oral)   Resp 16   Ht 4' 8\" (1.422 m)   Wt 129 lb 12.8 oz (58.9 kg)   SpO2 95%   BMI 29.10 kg/m²     Constitutional: Appears well-developed and well-nourished. No distress. HENT:   Head: Normocephalic and atraumatic. Eyes: No scleral icterus. Cardiovascular: Normal S1/S2, regular rhythm. No murmurs, rubs, or gallops. Pulmonary/Chest: Effort normal and breath sounds normal. No respiratory distress. No wheezes, rhonchi, or rales. Abdomen: Soft, NT/ND, +BS, no rebound or guarding, no masses, no HSM appreciated. Ext: No edema. Neurological: Alert. Psychiatric: Normal mood and affect. Behavior is normal.     Lab Results   Component Value Date/Time    Sodium 143 03/03/2021 11:16 AM    Potassium 4.8 03/03/2021 11:16 AM    Chloride 106 03/03/2021 11:16 AM    CO2 24 03/03/2021 11:16 AM    Anion gap 9 03/26/2014 05:00 AM    Glucose 91 03/03/2021 11:16 AM    BUN 27 03/03/2021 11:16 AM    Creatinine 1.06 (H) 03/03/2021 11:16 AM    BUN/Creatinine ratio 25 03/03/2021 11:16 AM    GFR est AA 53 (L) 03/03/2021 11:16 AM    GFR est non-AA 46 (L) 03/03/2021 11:16 AM    Calcium 9.4 03/03/2021 11:16 AM    Bilirubin, total 0.7 10/29/2020 01:29 PM    Alk.  phosphatase 124 (H) 10/29/2020 01:29 PM    Protein, total 7.0 10/29/2020 01:29 PM    Albumin 4.0 10/29/2020 01:29 PM    Globulin 3.3 03/12/2013 03:45 AM    A-G Ratio 1.3 10/29/2020 01:29 PM    ALT (SGPT) 9 10/29/2020 01:29 PM     Lab Results   Component Value Date/Time    Hemoglobin A1c 5.1 03/07/2014 01:35 PM    Hemoglobin A1c 5.4 02/25/2013 02:09 PM      Lab Results   Component Value Date/Time    Cholesterol, total 153 10/29/2020 01:29 PM    HDL Cholesterol 83 10/29/2020 01:29 PM    LDL, calculated 54 10/29/2020 01:29 PM    LDL, calculated 53 12/20/2018 12:05 PM    VLDL, calculated 16 10/29/2020 01:29 PM    VLDL, calculated 15 12/20/2018 12:05 PM    Triglyceride 89 10/29/2020 01:29 PM          ASSESSMENT/PLAN  Diagnoses and all orders for this visit:    1. Essential hypertension  Controlled on current regimen, continue   2. Spinal stenosis of lumbar region without neurogenic claudication  -     traMADoL (ULTRAM) 50 mg tablet; Take 1 Tab by mouth every six (6) hours as needed for Pain for up to 7 days. Max Daily Amount: 200 mg. Rare use - takes less than one week per year  3. Gastroesophageal reflux disease without esophagitis  Controlled on current regimen, continue         Health Maintenance Due   Topic Date Due    Shingrix Vaccine Age 49> (1 of 2) Never done        Follow-up and Dispositions    · Return in about 4 months (around 8/21/2021) for bp, pain. Reviewed plan of care. Patient has provided input and agrees with goals. The nurse provided the patient and/or family with advanced directive information if needed and encouraged the patient to provide a copy to the office when available.

## 2021-05-17 RX ORDER — CELECOXIB 200 MG/1
CAPSULE ORAL
Qty: 90 CAP | Refills: 3 | Status: SHIPPED | OUTPATIENT
Start: 2021-05-17 | End: 2022-05-16

## 2021-06-09 DIAGNOSIS — I10 ESSENTIAL HYPERTENSION: Primary | ICD-10-CM

## 2021-06-09 RX ORDER — LOSARTAN POTASSIUM 50 MG/1
TABLET ORAL
Qty: 180 TABLET | Refills: 3 | Status: SHIPPED | OUTPATIENT
Start: 2021-06-09 | End: 2022-05-16

## 2021-06-09 NOTE — TELEPHONE ENCOUNTER
PCP: Ruby Woodward MD    Last appt: 4/21/2021  Future Appointments   Date Time Provider Faisal Flores   8/23/2021  2:00 PM Ruby Woodward MD [facility] AMB       Requested Prescriptions     Pending Prescriptions Disp Refills    losartan (COZAAR) 50 mg tablet 180 Tablet 3     Sig: TAKE 1 TABLET TWICE A DAY

## 2021-06-09 NOTE — TELEPHONE ENCOUNTER
Requested Prescriptions     Pending Prescriptions Disp Refills    losartan (COZAAR) 50 mg tablet 180 Tablet 3

## 2021-08-23 ENCOUNTER — OFFICE VISIT (OUTPATIENT)
Dept: INTERNAL MEDICINE CLINIC | Age: 86
End: 2021-08-23
Payer: MEDICARE

## 2021-08-23 VITALS
TEMPERATURE: 97.5 F | SYSTOLIC BLOOD PRESSURE: 144 MMHG | WEIGHT: 124.8 LBS | BODY MASS INDEX: 28.07 KG/M2 | OXYGEN SATURATION: 94 % | HEIGHT: 56 IN | DIASTOLIC BLOOD PRESSURE: 74 MMHG | RESPIRATION RATE: 16 BRPM | HEART RATE: 68 BPM

## 2021-08-23 DIAGNOSIS — I10 ESSENTIAL HYPERTENSION: ICD-10-CM

## 2021-08-23 DIAGNOSIS — R06.09 DOE (DYSPNEA ON EXERTION): Primary | ICD-10-CM

## 2021-08-23 DIAGNOSIS — R53.83 FATIGUE, UNSPECIFIED TYPE: ICD-10-CM

## 2021-08-23 DIAGNOSIS — E78.5 HYPERLIPIDEMIA, UNSPECIFIED HYPERLIPIDEMIA TYPE: ICD-10-CM

## 2021-08-23 PROCEDURE — G8536 NO DOC ELDER MAL SCRN: HCPCS | Performed by: INTERNAL MEDICINE

## 2021-08-23 PROCEDURE — G8427 DOCREV CUR MEDS BY ELIG CLIN: HCPCS | Performed by: INTERNAL MEDICINE

## 2021-08-23 PROCEDURE — 1090F PRES/ABSN URINE INCON ASSESS: CPT | Performed by: INTERNAL MEDICINE

## 2021-08-23 PROCEDURE — G8510 SCR DEP NEG, NO PLAN REQD: HCPCS | Performed by: INTERNAL MEDICINE

## 2021-08-23 PROCEDURE — 99214 OFFICE O/P EST MOD 30 MIN: CPT | Performed by: INTERNAL MEDICINE

## 2021-08-23 PROCEDURE — 93000 ELECTROCARDIOGRAM COMPLETE: CPT | Performed by: INTERNAL MEDICINE

## 2021-08-23 PROCEDURE — G8419 CALC BMI OUT NRM PARAM NOF/U: HCPCS | Performed by: INTERNAL MEDICINE

## 2021-08-23 PROCEDURE — 1101F PT FALLS ASSESS-DOCD LE1/YR: CPT | Performed by: INTERNAL MEDICINE

## 2021-08-23 NOTE — PROGRESS NOTES
HPI  Ms. Alberta Hermosillo is a 80y.o. year old female, she is seen today for follow up HTN. No chest pain, occasionally feeling dizzy - better when stands still after going from sitting to standing before walking. Notes dyspnea on exertion for a long time, worse over the past couple of months. Has cough at times - seems worse after going from outside to inside- daughter feels it's chronic from allergies. No f/c or sweats. Has lost 5# since last visit - has mild chronic edema in left leg x years - better with elevating legs. BP at home has been 153-160/54-68, HR 59-68. No PND or orthopnea. Has to stop sooner while walking up stairs than she did a few months ago. Chief Complaint   Patient presents with    Hypertension    Follow-up        Prior to Admission medications    Medication Sig Start Date End Date Taking? Authorizing Provider   losartan (COZAAR) 50 mg tablet TAKE 1 TABLET TWICE A DAY 6/9/21  Yes Hailee Vidales MD   celecoxib (CELEBREX) 200 mg capsule TAKE 1 CAPSULE DAILY 5/17/21  Yes Hailee Vidales MD   nebivoloL (Bystolic) 10 mg tablet TAKE 1 TABLET DAILY 4/5/21  Yes Freddie Montgomery MD   omeprazole (PRILOSEC) 20 mg capsule TAKE 1 CAPSULE DAILY 4/2/21  Yes Hailee Vidales MD   famotidine (PEPCID) 20 mg tablet TAKE 1 TABLET NIGHTLY 3/14/21  Yes Hailee Vidales MD   atorvastatin (LIPITOR) 20 mg tablet TAKE 1 TABLET DAILY 2/8/21  Yes Hailee Vidales MD   furosemide (LASIX) 20 mg tablet TAKE 1 TABLET DAILY 2/1/21  Yes Hailee Vidales MD   amLODIPine (NORVASC) 5 mg tablet TAKE 1 TABLET DAILY 1/24/21  Yes Hailee Vidales MD   chlorthalidone (HYGROTON) 25 mg tablet TAKE ONE-HALF (1/2) TABLET DAILY 1/17/21  Yes Hailee Vidales MD   aspirin 81 mg chewable tablet Take 81 mg by mouth daily. Yes Provider, Historical   acetaminophen (Tylenol Extra Strength) 500 mg tablet Take  by mouth every six (6) hours as needed for Pain.    Yes Provider, Historical   ondansetron [facility] ODT) 4 mg disintegrating tablet Take 1 Tab by mouth every eight (8) hours as needed for Nausea or Vomiting. 5/14/20  Yes James Vick MD   cholecalciferol, vitamin D3, (VITAMIN D3) 2,000 unit tab Take  by mouth daily. Yes Provider, Historical         No Known Allergies      REVIEW OF SYSTEMS:  Per HPI    PHYSICAL EXAM:  Visit Vitals  BP (!) 144/74   Pulse 68   Temp 97.5 °F (36.4 °C) (Oral)   Resp 16   Ht 4' 8\" (1.422 m)   Wt 124 lb 12.8 oz (56.6 kg)   SpO2 94%   BMI 27.98 kg/m²     Constitutional: Appears well-developed and well-nourished. No distress. HENT:   Head: Normocephalic and atraumatic. Eyes: No scleral icterus. Neck: no lad, no tm, supple   Cardiovascular: Normal S1/S2, regular rhythm. No murmurs, rubs, or gallops. Pulmonary/Chest: Effort normal and breath sounds normal. No respiratory distress. No wheezes, rhonchi, or rales. Ext: No edema. Neurological: Alert. Psychiatric: Normal mood and affect. Behavior is normal.     Lab Results   Component Value Date/Time    Sodium 143 03/03/2021 11:16 AM    Potassium 4.8 03/03/2021 11:16 AM    Chloride 106 03/03/2021 11:16 AM    CO2 24 03/03/2021 11:16 AM    Anion gap 9 03/26/2014 05:00 AM    Glucose 91 03/03/2021 11:16 AM    BUN 27 03/03/2021 11:16 AM    Creatinine 1.06 (H) 03/03/2021 11:16 AM    BUN/Creatinine ratio 25 03/03/2021 11:16 AM    GFR est AA 53 (L) 03/03/2021 11:16 AM    GFR est non-AA 46 (L) 03/03/2021 11:16 AM    Calcium 9.4 03/03/2021 11:16 AM    Bilirubin, total 0.7 10/29/2020 01:29 PM    Alk.  phosphatase 124 (H) 10/29/2020 01:29 PM    Protein, total 7.0 10/29/2020 01:29 PM    Albumin 4.0 10/29/2020 01:29 PM    Globulin 3.3 03/12/2013 03:45 AM    A-G Ratio 1.3 10/29/2020 01:29 PM    ALT (SGPT) 9 10/29/2020 01:29 PM     Lab Results   Component Value Date/Time    Hemoglobin A1c 5.1 03/07/2014 01:35 PM    Hemoglobin A1c 5.4 02/25/2013 02:09 PM      Lab Results   Component Value Date/Time    Cholesterol, total 153 10/29/2020 01:29 PM    HDL Cholesterol 83 10/29/2020 01:29 PM    LDL, calculated 54 10/29/2020 01:29 PM    LDL, calculated 53 12/20/2018 12:05 PM    VLDL, calculated 16 10/29/2020 01:29 PM    VLDL, calculated 15 12/20/2018 12:05 PM    Triglyceride 89 10/29/2020 01:29 PM          ASSESSMENT/PLAN  Diagnoses and all orders for this visit:    1. CARMEN (dyspnea on exertion)  -     AMB POC EKG ROUTINE W/ 12 LEADS, INTER & REP  -     CBC WITH AUTOMATED DIFF; Future  -     ECHO ADULT COMPLETE; Future    2. Essential hypertension  -     ECHO ADULT COMPLETE; Future    3. Fatigue, unspecified type  -     TSH 3RD GENERATION; Future    4. Hyperlipidemia, unspecified hyperlipidemia type  -     METABOLIC PANEL, COMPREHENSIVE; Future  -     LIPID PANEL; Future      EKG sinus rhythm  Check labs as above  BP acceptable for age  Get echo to r/o valve abnormality, low EF    There are no preventive care reminders to display for this patient. Follow-up and Dispositions    · Return in about 3 months (around 11/23/2021), or if symptoms worsen or fail to improve, for breathing. Reviewed plan of care. Patient has provided input and agrees with goals. The nurse provided the patient and/or family with advanced directive information if needed and encouraged the patient to provide a copy to the office when available.

## 2021-08-23 NOTE — PROGRESS NOTES
Juliane Bui  Identified pt with two pt identifiers(name and ). Chief Complaint   Patient presents with    Hypertension    Follow-up       Reviewed record In preparation for visit and have obtained necessary documentation. 1. Have you been to the ER, urgent care clinic or hospitalized since your last visit? No     2. Have you seen or consulted any other health care providers outside of the [address] [place] [address] since your last visit? Include any pap smears or colon screening. No    Patient has an advance directive. Vitals reviewed with provider. Health Maintenance reviewed: There are no preventive care reminders to display for this patient. Wt Readings from Last 3 Encounters:   21 124 lb 12.8 oz (56.6 kg)   21 129 lb 12.8 oz (58.9 kg)   20 130 lb (59 kg)        Temp Readings from Last 3 Encounters:   21 97.5 °F (36.4 °C) (Oral)   21 98 °F (36.7 °C) (Oral)   20 97.8 °F (36.6 °C) (Oral)        BP Readings from Last 3 Encounters:   21 (!) 155/74   21 130/70   20 130/80        Pulse Readings from Last 3 Encounters:   21 68   21 63   20 62        Vitals:    21 1404   BP: (!) 155/74   Pulse: 68   Resp: 16   Temp: 97.5 °F (36.4 °C)   TempSrc: Oral   SpO2: 94%   Weight: 124 lb 12.8 oz (56.6 kg)   Height: 4' 8\" (1.422 m)   PainSc:   5   PainLoc: Back          Learning Assessment:   :       Learning Assessment 2019   PRIMARY LEARNER Patient   PRIMARY LANGUAGE ENGLISH   LEARNER PREFERENCE PRIMARY READING   ANSWERED BY patient   RELATIONSHIP SELF        Depression Screening:   :       3 most recent PHQ Screens 2021   Little interest or pleasure in doing things Not at all   Feeling down, depressed, irritable, or hopeless Not at all   Total Score PHQ 2 0        Fall Risk Assessment:   :       Fall Risk Assessment, last 12 mths 2021   Able to walk? Yes   Fall in past 12 months? 0   Do you feel unsteady? 0   Are you worried about falling 0        Abuse Screening:   :       Abuse Screening Questionnaire 1/13/2020 10/9/2019 2/15/2018   Do you ever feel afraid of your partner? N N N   Are you in a relationship with someone who physically or mentally threatens you? N N N   Is it safe for you to go home?  Y Y Y        ADL Screening:   :       ADL Assessment 2/15/2018   Feeding yourself No Help Needed   Getting from bed to chair No Help Needed   Getting dressed No Help Needed   Bathing or showering No Help Needed   Walk across the room (includes cane/walker) No Help Needed   Using the telphone No Help Needed   Taking your medications No Help Needed   Preparing meals No Help Needed   Managing money (expenses/bills) No Help Needed   Moderately strenuous housework (laundry) No Help Needed   Shopping for personal items (toiletries/medicines) No Help Needed   Shopping for groceries Help Needed   Driving Help Needed   Climbing a flight of stairs No Help Needed   Getting to places beyond walking distances Help Needed

## 2021-08-23 NOTE — LETTER
10/4/2021 10:57 AM    Ms. Vanda Driscoll   [address] [facility][1] [id number]    Results for orders placed or performed in visit on 08/23/21   CBC/DIFF AMBIGUOUS DEFAULT   Result Value Ref Range    WBC 5.2 3.4 - 10.8 x10E3/uL    RBC 4.01 3.77 - 5.28 x10E6/uL    HGB 12.4 11.1 - 15.9 g/dL    HCT 37.4 34.0 - 46.6 %    MCV 93 79 - 97 fL    MCH 30.9 26.6 - 33.0 pg    MCHC 33.2 31.5 - 35.7 g/dL    RDW 11.6 (L) 11.7 - 15.4 %    PLATELET 866 832 - 665 x10E3/uL    NEUTROPHILS 53 Not Estab. %    Lymphocytes 30 Not Estab. %    MONOCYTES 12 Not Estab. %    EOSINOPHILS 4 Not Estab. %    BASOPHILS 1 Not Estab. %    ABS. NEUTROPHILS 2.8 1.4 - 7.0 x10E3/uL    Abs Lymphocytes 1.6 0.7 - 3.1 x10E3/uL    ABS. MONOCYTES 0.6 0.1 - 0.9 x10E3/uL    ABS. EOSINOPHILS 0.2 0.0 - 0.4 x10E3/uL    ABS. BASOPHILS 0.1 0.0 - 0.2 x10E3/uL    IMMATURE GRANULOCYTES 0 Not Estab. %    ABS. IMM. GRANS. 0.0 0.0 - 0.1 [id number]/[id number]   METABOLIC PANEL, COMPREHENSIVE   Result Value Ref Range    Glucose 94 65 - 99 mg/dL    BUN 33 10 - 36 mg/dL    Creatinine 1.52 (H) 0.57 - 1.00 mg/dL    GFR est non-AA 30 (L) >59 mL/min/1.73    GFR est AA 34 (L) >59 mL/min/1.73    BUN/Creatinine ratio 22 12 - 28    Sodium 140 134 - 144 mmol/L    Potassium 4.5 3.5 - 5.2 mmol/L    Chloride 101 96 - 106 mmol/L    CO2 26 20 - 29 mmol/L    Calcium 9.5 8.7 - 10.3 mg/dL    Protein, total 7.3 6.0 - 8.5 g/dL    Albumin 4.3 3.5 - 4.6 g/dL    GLOBULIN, TOTAL 3.0 1.5 - 4.5 g/dL    A-G Ratio 1.4 1.2 - 2.2    Bilirubin, total 0.7 0.0 - 1.2 mg/dL    Alk.  phosphatase 108 48 - 121 IU/L    AST (SGOT) 19 0 - 40 IU/L    ALT (SGPT) 13 0 - 32 IU/L   LIPID PANEL   Result Value Ref Range    Cholesterol, total 213 (H) 100 - 199 mg/dL    Triglyceride 108 0 - 149 mg/dL    HDL Cholesterol 79 >39 mg/dL    VLDL, calculated 19 5 - 40 mg/dL    LDL, calculated 115 (H) 0 - 99 mg/dL   TSH 3RD GENERATION   Result Value Ref Range    TSH 3.270 0.450 - 4.500 uIU/mL   SPECIMEN STATUS REPORT   Result Value Ref Range    SPECIMEN STATUS REPORT COMMENT                    RECOMMENDATIONS  Labs okay other than you appear more dehydrated - increase fluid intake and I will repeat labs for kidney function in the future.           Sincerely,      Brian Currie MD

## 2021-09-01 LAB
ALBUMIN SERPL-MCNC: 4.3 G/DL (ref 3.5–4.6)
ALBUMIN/GLOB SERPL: 1.4 {RATIO} (ref 1.2–2.2)
ALP SERPL-CCNC: 108 IU/L (ref 48–121)
ALT SERPL-CCNC: 13 IU/L (ref 0–32)
AST SERPL-CCNC: 19 IU/L (ref 0–40)
BASOPHILS # BLD AUTO: 0.1 X10E3/UL (ref 0–0.2)
BASOPHILS NFR BLD AUTO: 1 %
BILIRUB SERPL-MCNC: 0.7 MG/DL (ref 0–1.2)
BUN SERPL-MCNC: 33 MG/DL (ref 10–36)
BUN/CREAT SERPL: 22 (ref 12–28)
CALCIUM SERPL-MCNC: 9.5 MG/DL (ref 8.7–10.3)
CHLORIDE SERPL-SCNC: 101 MMOL/L (ref 96–106)
CHOLEST SERPL-MCNC: 213 MG/DL (ref 100–199)
CO2 SERPL-SCNC: 26 MMOL/L (ref 20–29)
CREAT SERPL-MCNC: 1.52 MG/DL (ref 0.57–1)
EOSINOPHIL # BLD AUTO: 0.2 X10E3/UL (ref 0–0.4)
EOSINOPHIL NFR BLD AUTO: 4 %
ERYTHROCYTE [DISTWIDTH] IN BLOOD BY AUTOMATED COUNT: 11.6 % (ref 11.7–15.4)
GLOBULIN SER CALC-MCNC: 3 G/DL (ref 1.5–4.5)
GLUCOSE SERPL-MCNC: 94 MG/DL (ref 65–99)
HCT VFR BLD AUTO: 37.4 % (ref 34–46.6)
HDLC SERPL-MCNC: 79 MG/DL
HGB BLD-MCNC: 12.4 G/DL (ref 11.1–15.9)
IMM GRANULOCYTES # BLD AUTO: 0 X10E3/UL (ref 0–0.1)
IMM GRANULOCYTES NFR BLD AUTO: 0 %
LDLC SERPL CALC-MCNC: 115 MG/DL (ref 0–99)
LYMPHOCYTES # BLD AUTO: 1.6 X10E3/UL (ref 0.7–3.1)
LYMPHOCYTES NFR BLD AUTO: 30 %
MCH RBC QN AUTO: 30.9 PG (ref 26.6–33)
MCHC RBC AUTO-ENTMCNC: 33.2 G/DL (ref 31.5–35.7)
MCV RBC AUTO: 93 FL (ref 79–97)
MONOCYTES # BLD AUTO: 0.6 X10E3/UL (ref 0.1–0.9)
MONOCYTES NFR BLD AUTO: 12 %
NEUTROPHILS # BLD AUTO: 2.8 X10E3/UL (ref 1.4–7)
NEUTROPHILS NFR BLD AUTO: 53 %
PLATELET # BLD AUTO: 251 X10E3/UL (ref 150–450)
POTASSIUM SERPL-SCNC: 4.5 MMOL/L (ref 3.5–5.2)
PROT SERPL-MCNC: 7.3 G/DL (ref 6–8.5)
RBC # BLD AUTO: 4.01 X10E6/UL (ref 3.77–5.28)
SODIUM SERPL-SCNC: 140 MMOL/L (ref 134–144)
SPECIMEN STATUS REPORT, ROLRST: NORMAL
TRIGL SERPL-MCNC: 108 MG/DL (ref 0–149)
TSH SERPL DL<=0.005 MIU/L-ACNC: 3.27 UIU/ML (ref 0.45–4.5)
VLDLC SERPL CALC-MCNC: 19 MG/DL (ref 5–40)
WBC # BLD AUTO: 5.2 X10E3/UL (ref 3.4–10.8)

## 2021-09-16 ENCOUNTER — HOSPITAL ENCOUNTER (OUTPATIENT)
Dept: NON INVASIVE DIAGNOSTICS | Age: 86
Discharge: HOME | End: 2021-09-16
Attending: INTERNAL MEDICINE
Payer: MEDICARE

## 2021-09-16 DIAGNOSIS — I10 ESSENTIAL HYPERTENSION: ICD-10-CM

## 2021-09-16 DIAGNOSIS — R06.09 DOE (DYSPNEA ON EXERTION): ICD-10-CM

## 2021-09-16 LAB
AV R PG: 74.3 MMHG
ECHO AR MAX VEL PISA: 430.99 CM/S
ECHO AV CUSP MM: 1.66 CM
ECHO AV PEAK GRADIENT: 3.98 MMHG
ECHO AV PEAK VELOCITY: 99.71 CM/S
ECHO AV REGURGITANT PHT: 404.28 MS
ECHO LA AREA 4C: 15.96 CM2
ECHO LA MAJOR AXIS: 3.24 CM
ECHO LA TO AORTIC ROOT RATIO: 1.04
ECHO LA TO AORTIC ROOT RATIO: 1.04
ECHO LA VOL 2C: 43.44 ML (ref 22–52)
ECHO LA VOL 4C: 36.97 ML (ref 22–52)
ECHO LA VOL BP: 44.26 ML (ref 22–52)
ECHO LV E' SEPTAL VELOCITY: 5.75 CM/S
ECHO LV INTERNAL DIMENSION DIASTOLIC: 2.93 CM (ref 3.9–5.3)
ECHO LV INTERNAL DIMENSION SYSTOLIC: 2.05 CM
ECHO LV IVSD: 1.36 CM (ref 0.6–0.9)
ECHO LV IVSS: 1.63 CM
ECHO LV MASS 2D: 127.4 G (ref 67–162)
ECHO LV POSTERIOR WALL DIASTOLIC: 1.33 CM (ref 0.6–0.9)
ECHO LV POSTERIOR WALL SYSTOLIC: 1.73 CM
ECHO LVOT DIAM: 2.12 CM
ECHO MV A VELOCITY: 76.81 CM/S
ECHO MV E DECELERATION TIME (DT): 237.13 MS
ECHO MV E VELOCITY: 55.57 CM/S
ECHO MV E/A RATIO: 0.72
ECHO MV E/E' SEPTAL: 9.66
ECHO RV INTERNAL DIMENSION: 3.07 CM

## 2021-09-16 PROCEDURE — 93306 TTE W/DOPPLER COMPLETE: CPT

## 2021-09-20 ENCOUNTER — DOCUMENTATION ONLY (OUTPATIENT)
Dept: INTERNAL MEDICINE CLINIC | Age: 86
End: 2021-09-20

## 2021-10-01 DIAGNOSIS — I10 ESSENTIAL HYPERTENSION: ICD-10-CM

## 2021-10-01 RX ORDER — NEBIVOLOL 10 MG/1
TABLET ORAL
Qty: 90 TABLET | Refills: 3 | Status: SHIPPED | OUTPATIENT
Start: 2021-10-01 | End: 2022-09-26

## 2021-11-24 ENCOUNTER — OFFICE VISIT (OUTPATIENT)
Dept: INTERNAL MEDICINE CLINIC | Age: 86
End: 2021-11-24
Payer: MEDICARE

## 2021-11-24 VITALS
OXYGEN SATURATION: 98 % | TEMPERATURE: 98.3 F | BODY MASS INDEX: 28.3 KG/M2 | HEIGHT: 56 IN | DIASTOLIC BLOOD PRESSURE: 80 MMHG | SYSTOLIC BLOOD PRESSURE: 144 MMHG | WEIGHT: 125.8 LBS | HEART RATE: 70 BPM | RESPIRATION RATE: 16 BRPM

## 2021-11-24 DIAGNOSIS — I10 ESSENTIAL HYPERTENSION: Primary | ICD-10-CM

## 2021-11-24 DIAGNOSIS — L30.9 DERMATITIS: ICD-10-CM

## 2021-11-24 PROCEDURE — G8427 DOCREV CUR MEDS BY ELIG CLIN: HCPCS | Performed by: INTERNAL MEDICINE

## 2021-11-24 PROCEDURE — 1090F PRES/ABSN URINE INCON ASSESS: CPT | Performed by: INTERNAL MEDICINE

## 2021-11-24 PROCEDURE — G8510 SCR DEP NEG, NO PLAN REQD: HCPCS | Performed by: INTERNAL MEDICINE

## 2021-11-24 PROCEDURE — 1101F PT FALLS ASSESS-DOCD LE1/YR: CPT | Performed by: INTERNAL MEDICINE

## 2021-11-24 PROCEDURE — 99214 OFFICE O/P EST MOD 30 MIN: CPT | Performed by: INTERNAL MEDICINE

## 2021-11-24 PROCEDURE — G8419 CALC BMI OUT NRM PARAM NOF/U: HCPCS | Performed by: INTERNAL MEDICINE

## 2021-11-24 PROCEDURE — G8536 NO DOC ELDER MAL SCRN: HCPCS | Performed by: INTERNAL MEDICINE

## 2021-11-24 RX ORDER — TRIAMCINOLONE ACETONIDE 1 MG/G
OINTMENT TOPICAL 2 TIMES DAILY
Qty: 30 G | Refills: 0 | Status: SHIPPED | OUTPATIENT
Start: 2021-11-24

## 2021-11-24 NOTE — PROGRESS NOTES
Juliane Bui  Identified pt with two pt identifiers(name and ). Chief Complaint   Patient presents with    Breathing Problem    Skin Problem     Right arm, has a itchy spot        Reviewed record In preparation for visit and have obtained necessary documentation. 1. Have you been to the ER, urgent care clinic or hospitalized since your last visit? No     2. Have you seen or consulted any other health care providers outside of the [address] [place] [address] since your last visit? Include any pap smears or colon screening. No    Patient does not have an advance directive. Vitals reviewed with provider. Health Maintenance reviewed:     Health Maintenance Due   Topic    Medicare Yearly Exam         Wt Readings from Last 3 Encounters:   21 125 lb 12.8 oz (57.1 kg)   21 124 lb 12.8 oz (56.6 kg)   21 129 lb 12.8 oz (58.9 kg)      Temp Readings from Last 3 Encounters:   21 98.3 °F (36.8 °C) (Oral)   21 97.5 °F (36.4 °C) (Oral)   21 98 °F (36.7 °C) (Oral)      BP Readings from Last 3 Encounters:   21 (!) 197/67   21 (!) 144/74   21 130/70      Pulse Readings from Last 3 Encounters:   21 70   21 68   21 63      Vitals:    21 1323   BP: (!) 197/67   Pulse: 70   Resp: 16   Temp: 98.3 °F (36.8 °C)   TempSrc: Oral   SpO2: 98%   Weight: 125 lb 12.8 oz (57.1 kg)   Height: 4' 8\" (1.422 m)   PainSc:   0 - No pain          Learning Assessment:   :     Learning Assessment 2019   PRIMARY LEARNER Patient   PRIMARY LANGUAGE ENGLISH   LEARNER PREFERENCE PRIMARY READING   ANSWERED BY patient   RELATIONSHIP SELF        Depression Screening:   :     3 most recent PHQ Screens 2021   Little interest or pleasure in doing things Not at all   Feeling down, depressed, irritable, or hopeless Not at all   Total Score PHQ 2 0        Fall Risk Assessment:   :     Fall Risk Assessment, last 12 mths 2021   Able to walk?  Yes   Fall in past 12 months? 0   Do you feel unsteady? 0   Are you worried about falling 0        Abuse Screening:   :     Abuse Screening Questionnaire 1/13/2020 10/9/2019 2/15/2018   Do you ever feel afraid of your partner? N N N   Are you in a relationship with someone who physically or mentally threatens you? N N N   Is it safe for you to go home?  Y Y Y        ADL Screening:   :     ADL Assessment 2/15/2018   Feeding yourself No Help Needed   Getting from bed to chair No Help Needed   Getting dressed No Help Needed   Bathing or showering No Help Needed   Walk across the room (includes cane/walker) No Help Needed   Using the telphone No Help Needed   Taking your medications No Help Needed   Preparing meals No Help Needed   Managing money (expenses/bills) No Help Needed   Moderately strenuous housework (laundry) No Help Needed   Shopping for personal items (toiletries/medicines) No Help Needed   Shopping for groceries Help Needed   Driving Help Needed   Climbing a flight of stairs No Help Needed   Getting to places beyond walking distances Help Needed

## 2021-11-24 NOTE — PROGRESS NOTES
HPI  Ms. Cristian You is a 80y.o. year old female, she is seen today for follow up HTN. No chest pain, dizziness. Will feel shortness of breath with exertion, not getting worse, no associated diaphoresis or nausea. No PND or orthopnea or edema. Echo 9/16/2021:   Result status: Final result  · LV: Estimated LVEF is 60 - 65%. Visually measured ejection fraction. Normal cavity size and systolic function (ejection fraction normal). Mild concentric hypertrophy. Wall motion: normal.  · AV: Mild aortic valve regurgitation is present. Normally BP at home is about 140 range. Rash on right forearm - red, itching, scaling. Present for several weeks. Chief Complaint   Patient presents with    Breathing Problem    Skin Problem     Right arm, has a itchy spot         Prior to Admission medications    Medication Sig Start Date End Date Taking? Authorizing Provider   triamcinolone acetonide (KENALOG) 0.1 % ointment Apply  to affected area two (2) times a day. use thin layer 11/24/21  Yes Victorino Jo MD   nebivoloL (BYSTOLIC) 10 mg tablet TAKE 1 TABLET DAILY 10/1/21   Victorino Jo MD   losartan (COZAAR) 50 mg tablet TAKE 1 TABLET TWICE A DAY 6/9/21   Victorino Jo MD   celecoxib (CELEBREX) 200 mg capsule TAKE 1 CAPSULE DAILY 5/17/21   Victorino Jo MD   omeprazole (PRILOSEC) 20 mg capsule TAKE 1 CAPSULE DAILY 4/2/21   Victorino Jo MD   famotidine (PEPCID) 20 mg tablet TAKE 1 TABLET NIGHTLY 3/14/21   Victorino Jo MD   atorvastatin (LIPITOR) 20 mg tablet TAKE 1 TABLET DAILY 2/8/21   Victorino Jo MD   furosemide (LASIX) 20 mg tablet TAKE 1 TABLET DAILY 2/1/21   Victorino Jo MD   amLODIPine (NORVASC) 5 mg tablet TAKE 1 TABLET DAILY 1/24/21   Victorino Jo MD   chlorthalidone (HYGROTON) 25 mg tablet TAKE ONE-HALF (1/2) TABLET DAILY 1/17/21   Victorino Jo MD   aspirin 81 mg chewable tablet Take 81 mg by mouth daily.     Provider, Historical acetaminophen (Tylenol Extra Strength) 500 mg tablet Take  by mouth every six (6) hours as needed for Pain. Provider, Historical   ondansetron (ZOFRAN ODT) 4 mg disintegrating tablet Take 1 Tab by mouth every eight (8) hours as needed for Nausea or Vomiting. 5/14/20   Ximena Meadows MD   cholecalciferol, vitamin D3, (VITAMIN D3) 2,000 unit tab Take  by mouth daily. Provider, Historical         No Known Allergies      REVIEW OF SYSTEMS:  Per HPI    PHYSICAL EXAM:  Visit Vitals  BP (!) 144/80   Pulse 70   Temp 98.3 °F (36.8 °C) (Oral)   Resp 16   Ht 4' 8\" (1.422 m)   Wt 125 lb 12.8 oz (57.1 kg)   SpO2 98%   BMI 28.20 kg/m²     Constitutional: Appears well-developed and well-nourished. No distress. HENT:   Head: Normocephalic and atraumatic. Eyes: No scleral icterus. Cardiovascular: Normal S1/S2, regular rhythm. No murmurs, rubs, or gallops. Pulmonary/Chest: Effort normal and breath sounds normal. No respiratory distress. No wheezes, rhonchi, or rales. Right arm: approx 2cm erythematous scaly spot  Ext: No edema. Neurological: Alert. Psychiatric: Normal mood and affect. Behavior is normal.     Lab Results   Component Value Date/Time    Sodium 140 08/31/2021 10:47 AM    Potassium 4.5 08/31/2021 10:47 AM    Chloride 101 08/31/2021 10:47 AM    CO2 26 08/31/2021 10:47 AM    Anion gap 9 03/26/2014 05:00 AM    Glucose 94 08/31/2021 10:47 AM    BUN 33 08/31/2021 10:47 AM    Creatinine 1.52 (H) 08/31/2021 10:47 AM    BUN/Creatinine ratio 22 08/31/2021 10:47 AM    GFR est AA 34 (L) 08/31/2021 10:47 AM    GFR est non-AA 30 (L) 08/31/2021 10:47 AM    Calcium 9.5 08/31/2021 10:47 AM    Bilirubin, total 0.7 08/31/2021 10:47 AM    Alk.  phosphatase 108 08/31/2021 10:47 AM    Protein, total 7.3 08/31/2021 10:47 AM    Albumin 4.3 08/31/2021 10:47 AM    Globulin 3.3 03/12/2013 03:45 AM    A-G Ratio 1.4 08/31/2021 10:47 AM    ALT (SGPT) 13 08/31/2021 10:47 AM     Lab Results   Component Value Date/Time Hemoglobin A1c 5.1 03/07/2014 01:35 PM    Hemoglobin A1c 5.4 02/25/2013 02:09 PM      Lab Results   Component Value Date/Time    Cholesterol, total 213 (H) 08/31/2021 10:47 AM    HDL Cholesterol 79 08/31/2021 10:47 AM    LDL, calculated 115 (H) 08/31/2021 10:47 AM    LDL, calculated 53 12/20/2018 12:05 PM    VLDL, calculated 19 08/31/2021 10:47 AM    VLDL, calculated 15 12/20/2018 12:05 PM    Triglyceride 108 08/31/2021 10:47 AM          ASSESSMENT/PLAN  Diagnoses and all orders for this visit:    1. Essential hypertension    2. Dermatitis  -     triamcinolone acetonide (KENALOG) 0.1 % ointment; Apply  to affected area two (2) times a day. use thin layer    BP acceptable for age  Has persistent dyspnea on exertion, not getting worse, declining further eval at this time given age and declining further interventions      Health Maintenance Due   Topic Date Due    Medicare Yearly Exam  12/15/2021        Follow-up and Dispositions    · Return for 3-4 mos bp, AWV. Reviewed plan of care. Patient has provided input and agrees with goals. The nurse provided the patient and/or family with advanced directive information if needed and encouraged the patient to provide a copy to the office when available.

## 2022-01-12 DIAGNOSIS — I10 ESSENTIAL HYPERTENSION: ICD-10-CM

## 2022-01-12 RX ORDER — CHLORTHALIDONE 25 MG/1
TABLET ORAL
Qty: 45 TABLET | Refills: 3 | Status: SHIPPED | OUTPATIENT
Start: 2022-01-12

## 2022-01-19 DIAGNOSIS — I10 ESSENTIAL HYPERTENSION: ICD-10-CM

## 2022-01-19 RX ORDER — AMLODIPINE BESYLATE 5 MG/1
TABLET ORAL
Qty: 90 TABLET | Refills: 3 | Status: SHIPPED | OUTPATIENT
Start: 2022-01-19

## 2022-01-27 DIAGNOSIS — I10 ESSENTIAL HYPERTENSION: ICD-10-CM

## 2022-01-27 RX ORDER — FUROSEMIDE 20 MG/1
TABLET ORAL
Qty: 90 TABLET | Refills: 3 | Status: SHIPPED | OUTPATIENT
Start: 2022-01-27

## 2022-02-03 DIAGNOSIS — G45.9 TRANSIENT CEREBRAL ISCHEMIA, UNSPECIFIED TYPE: ICD-10-CM

## 2022-02-03 RX ORDER — ATORVASTATIN CALCIUM 20 MG/1
TABLET, FILM COATED ORAL
Qty: 90 TABLET | Refills: 3 | Status: SHIPPED | OUTPATIENT
Start: 2022-02-03

## 2022-02-14 DIAGNOSIS — K21.9 GASTROESOPHAGEAL REFLUX DISEASE WITHOUT ESOPHAGITIS: ICD-10-CM

## 2022-02-14 RX ORDER — FAMOTIDINE 20 MG/1
TABLET, FILM COATED ORAL
Qty: 90 TABLET | Refills: 3 | Status: SHIPPED | OUTPATIENT
Start: 2022-02-14

## 2022-03-18 PROBLEM — G45.9 TIA (TRANSIENT ISCHEMIC ATTACK): Status: ACTIVE | Noted: 2017-12-07

## 2022-03-18 PROBLEM — M81.0 AGE-RELATED OSTEOPOROSIS WITHOUT CURRENT PATHOLOGICAL FRACTURE: Status: ACTIVE | Noted: 2017-12-07

## 2022-03-19 PROBLEM — K21.9 GASTROESOPHAGEAL REFLUX DISEASE WITHOUT ESOPHAGITIS: Status: ACTIVE | Noted: 2018-01-16

## 2022-03-19 PROBLEM — M48.061 LUMBAR SPINAL STENOSIS: Status: ACTIVE | Noted: 2018-07-12

## 2022-03-19 PROBLEM — I10 ESSENTIAL HYPERTENSION: Status: ACTIVE | Noted: 2018-02-15

## 2022-03-28 DIAGNOSIS — K21.9 GASTROESOPHAGEAL REFLUX DISEASE WITHOUT ESOPHAGITIS: ICD-10-CM

## 2022-03-28 RX ORDER — OMEPRAZOLE 20 MG/1
CAPSULE, DELAYED RELEASE ORAL
Qty: 90 CAPSULE | Refills: 3 | Status: SHIPPED | OUTPATIENT
Start: 2022-03-28

## 2022-03-29 ENCOUNTER — OFFICE VISIT (OUTPATIENT)
Dept: INTERNAL MEDICINE CLINIC | Age: 87
End: 2022-03-29
Payer: MEDICARE

## 2022-03-29 VITALS
BODY MASS INDEX: 28.48 KG/M2 | OXYGEN SATURATION: 98 % | HEIGHT: 56 IN | SYSTOLIC BLOOD PRESSURE: 148 MMHG | RESPIRATION RATE: 14 BRPM | TEMPERATURE: 97.8 F | DIASTOLIC BLOOD PRESSURE: 62 MMHG | WEIGHT: 126.6 LBS | HEART RATE: 58 BPM

## 2022-03-29 DIAGNOSIS — M25.551 CHRONIC RIGHT HIP PAIN: ICD-10-CM

## 2022-03-29 DIAGNOSIS — I10 ESSENTIAL HYPERTENSION: ICD-10-CM

## 2022-03-29 DIAGNOSIS — G89.29 CHRONIC RIGHT HIP PAIN: ICD-10-CM

## 2022-03-29 DIAGNOSIS — E78.5 HYPERLIPIDEMIA, UNSPECIFIED HYPERLIPIDEMIA TYPE: ICD-10-CM

## 2022-03-29 DIAGNOSIS — Z00.00 MEDICARE ANNUAL WELLNESS VISIT, SUBSEQUENT: Primary | ICD-10-CM

## 2022-03-29 PROCEDURE — G0439 PPPS, SUBSEQ VISIT: HCPCS | Performed by: INTERNAL MEDICINE

## 2022-03-29 PROCEDURE — G8510 SCR DEP NEG, NO PLAN REQD: HCPCS | Performed by: INTERNAL MEDICINE

## 2022-03-29 PROCEDURE — G8536 NO DOC ELDER MAL SCRN: HCPCS | Performed by: INTERNAL MEDICINE

## 2022-03-29 PROCEDURE — G8427 DOCREV CUR MEDS BY ELIG CLIN: HCPCS | Performed by: INTERNAL MEDICINE

## 2022-03-29 PROCEDURE — G8419 CALC BMI OUT NRM PARAM NOF/U: HCPCS | Performed by: INTERNAL MEDICINE

## 2022-03-29 PROCEDURE — 99214 OFFICE O/P EST MOD 30 MIN: CPT | Performed by: INTERNAL MEDICINE

## 2022-03-29 PROCEDURE — 1090F PRES/ABSN URINE INCON ASSESS: CPT | Performed by: INTERNAL MEDICINE

## 2022-03-29 PROCEDURE — 1101F PT FALLS ASSESS-DOCD LE1/YR: CPT | Performed by: INTERNAL MEDICINE

## 2022-03-29 RX ORDER — METHYLPREDNISOLONE 4 MG/1
TABLET ORAL
Qty: 1 DOSE PACK | Refills: 0 | Status: SHIPPED | OUTPATIENT
Start: 2022-03-29 | End: 2022-04-04

## 2022-03-29 NOTE — PROGRESS NOTES
HPI  Ms. Gracy Phalen is a 80y.o. year old female, she is seen today for follow up HTN, AWV. Complains of right groin pain which radiates to right hip and knee. No falls or injuries. Pain for a few months, worse over the weekend after a long car ride to [address]. Has tramadol but hasn't tried - doesn't like to use. Tried tylenol and didn't help. Pain worse with walking up stairs and flexing right hip. Denies chest pain, shortness of breath, dizziness/lightheadedness, +few headaches - notices after reading for longer periods of time. + chronic mild edema, sometimes worse than others. If turns quickly tends to lose balance or if stands quickly but otherwise okay. Highest bp gets at home is 895 systolic. Chief Complaint   Patient presents with    Blood Pressure Check     Room 2C //     Annual Wellness Visit        Prior to Admission medications    Medication Sig Start Date End Date Taking? Authorizing Provider   methylPREDNISolone (MEDROL DOSEPACK) 4 mg tablet use as directed 3/29/22 4/4/22 Yes Shoshana Foley MD   omeprazole (PRILOSEC) 20 mg capsule TAKE 1 CAPSULE DAILY 3/28/22  Yes Shoshana Foley MD   famotidine (PEPCID) 20 mg tablet TAKE 1 TABLET NIGHTLY 2/14/22  Yes Shoshana Foley MD   atorvastatin (LIPITOR) 20 mg tablet TAKE 1 TABLET DAILY 2/3/22  Yes Shoshana Foley MD   furosemide (LASIX) 20 mg tablet TAKE 1 TABLET DAILY 1/27/22  Yes Shoshana Foley MD   amLODIPine (NORVASC) 5 mg tablet TAKE 1 TABLET DAILY 1/19/22  Yes Shoshana Foley MD   chlorthalidone (HYGROTON) 25 mg tablet TAKE ONE-HALF (1/2) TABLET DAILY 1/12/22  Yes Shoshana Foley MD   triamcinolone acetonide (KENALOG) 0.1 % ointment Apply  to affected area two (2) times a day.  use thin layer 11/24/21  Yes Shoshana Foley MD   nebivoloL (BYSTOLIC) 10 mg tablet TAKE 1 TABLET DAILY 10/1/21  Yes Shoshana Foley MD   losartan (COZAAR) 50 mg tablet TAKE 1 TABLET TWICE A DAY 6/9/21  Yes [address] [place] [address], Ani Angulo MD   celecoxib (CELEBREX) 200 mg capsule TAKE 1 CAPSULE DAILY 5/17/21  Yes Elsa Agustin MD   aspirin 81 mg chewable tablet Take 81 mg by mouth daily. Yes Provider, Historical   acetaminophen (Tylenol Extra Strength) 500 mg tablet Take  by mouth every six (6) hours as needed for Pain. Yes Provider, Historical   ondansetron (ZOFRAN ODT) 4 mg disintegrating tablet Take 1 Tab by mouth every eight (8) hours as needed for Nausea or Vomiting. 5/14/20  Yes Elsa Agustin MD   cholecalciferol, vitamin D3, (VITAMIN D3) 2,000 unit tab Take  by mouth daily. Yes Provider, Historical         No Known Allergies      REVIEW OF SYSTEMS:  Per HPI    PHYSICAL EXAM:  Visit Vitals  BP (!) 148/62   Pulse (!) 58   Temp 97.8 °F (36.6 °C) (Oral)   Resp 14   Ht 4' 8\" (1.422 m)   Wt 126 lb 9.6 oz (57.4 kg)   SpO2 98%   BMI 28.38 kg/m²     Constitutional: Appears well-developed and well-nourished. No distress. HENT:   Head: Normocephalic and atraumatic. Eyes: No scleral icterus. Neck: no lad, no tm, supple   Cardiovascular: Normal S1/S2, regular rhythm. No murmurs, rubs, or gallops. Pulmonary/Chest: Effort normal and breath sounds normal. No respiratory distress. No wheezes, rhonchi, or rales. Abdomen: Soft, NT/ND, +BS, no rebound or guarding, no masses, no HSM appreciated. Back: no pain on palpation  Right hip: decreased rom internal and external rotation due to pain, +pain on palpation right lateral hip, +pain in right hip and knee with flexion hip  Ext: No edema. Neurological: Alert. Psychiatric: Normal mood and affect.  Behavior is normal.     Lab Results   Component Value Date/Time    Sodium 140 08/31/2021 10:47 AM    Potassium 4.5 08/31/2021 10:47 AM    Chloride 101 08/31/2021 10:47 AM    CO2 26 08/31/2021 10:47 AM    Anion gap 9 03/26/2014 05:00 AM    Glucose 94 08/31/2021 10:47 AM    BUN 33 08/31/2021 10:47 AM    Creatinine 1.52 (H) 08/31/2021 10:47 AM    BUN/Creatinine ratio 22 08/31/2021 10:47 AM    GFR est AA 34 (L) 08/31/2021 10:47 AM    GFR est non-AA 30 (L) 08/31/2021 10:47 AM    Calcium 9.5 08/31/2021 10:47 AM    Bilirubin, total 0.7 08/31/2021 10:47 AM    Alk. phosphatase 108 08/31/2021 10:47 AM    Protein, total 7.3 08/31/2021 10:47 AM    Albumin 4.3 08/31/2021 10:47 AM    Globulin 3.3 03/12/2013 03:45 AM    A-G Ratio 1.4 08/31/2021 10:47 AM    ALT (SGPT) 13 08/31/2021 10:47 AM     Lab Results   Component Value Date/Time    Hemoglobin A1c 5.1 03/07/2014 01:35 PM    Hemoglobin A1c 5.4 02/25/2013 02:09 PM      Lab Results   Component Value Date/Time    Cholesterol, total 213 (H) 08/31/2021 10:47 AM    HDL Cholesterol 79 08/31/2021 10:47 AM    LDL, calculated 115 (H) 08/31/2021 10:47 AM    LDL, calculated 53 12/20/2018 12:05 PM    VLDL, calculated 19 08/31/2021 10:47 AM    VLDL, calculated 15 12/20/2018 12:05 PM    Triglyceride 108 08/31/2021 10:47 AM          ASSESSMENT/PLAN  Diagnoses and all orders for this visit:    1. Medicare annual wellness visit, subsequent    2. Essential hypertension    3. Chronic right hip pain  -     methylPREDNISolone (MEDROL DOSEPACK) 4 mg tablet; use as directed    4. Hyperlipidemia, unspecified hyperlipidemia type  -     LIPID PANEL; Future  -     METABOLIC PANEL, COMPREHENSIVE; Future      BP acceptable - continue current medications   Due for lipids, on statin  Steroid taper for hip pain, hold celebrex during taper, suspect flare of hip OA     There are no preventive care reminders to display for this patient. Follow-up and Dispositions    · Return in about 4 months (around 7/29/2022) for chol, bp, labs prior. Reviewed plan of care. Patient has provided input and agrees with goals. The nurse provided the patient and/or family with advanced directive information if needed and encouraged the patient to provide a copy to the office when available.            This is the Subsequent Medicare Annual Wellness Exam, performed 12 months or more after the Initial AWV or the last Subsequent AWV    I have reviewed the patient's medical history in detail and updated the computerized patient record. Assessment/Plan   Education and counseling provided:  Are appropriate based on today's review and evaluation    1. Medicare annual wellness visit, subsequent  2. Essential hypertension  3. Chronic right hip pain  -     methylPREDNISolone (MEDROL DOSEPACK) 4 mg tablet; use as directed, Normal, Disp-1 Dose Pack, R-0  4. Hyperlipidemia, unspecified hyperlipidemia type  -     LIPID PANEL; Future  -     METABOLIC PANEL, COMPREHENSIVE; Future       Depression Risk Factor Screening     3 most recent PHQ Screens 3/29/2022   Little interest or pleasure in doing things Not at all   Feeling down, depressed, irritable, or hopeless Not at all   Total Score PHQ 2 0       Alcohol & Drug Abuse Risk Screen    Do you average more than 1 drink per night or more than 7 drinks a week:  No    On any one occasion in the past three months have you have had more than 3 drinks containing alcohol:  No          Functional Ability and Level of Safety    Hearing: Hearing is good. Activities of Daily Living: The home contains: [place] bars  Patient needs help with:  transportation      Ambulation: with mild difficulty     Fall Risk:  Fall Risk Assessment, last 12 mths 3/29/2022   Able to walk? Yes   Fall in past 12 months? 0   Do you feel unsteady? 1   Are you worried about falling 0      Abuse Screen:  Patient is not abused       Cognitive Screening    Has your family/caregiver stated any concerns about your memory: no     Cognitive Screening: N/A    Health Maintenance Due     There are no preventive care reminders to display for this patient.     Patient Care Team   Patient Care Team:  Gissel Hernández MD as PCP - General (Internal Medicine)  Gissel Hernández MD as PCP - REHABILITATION [facility] Empaneled Provider    History     Patient Active Problem List   Diagnosis Code    Spinal stenosis M48.00    TIA (transient ischemic attack) G45.9    Age-related osteoporosis without current pathological fracture M81.0    Gastroesophageal reflux disease without esophagitis K21.9    Essential hypertension I10    Lumbar spinal stenosis M48.061     Past Medical History:   Diagnosis Date    Arthritis     Beta-blocker therapy     Chronic pain     BACK    GERD (gastroesophageal reflux disease)     History of blood transfusion 3/2013    [facility] [address], NO REACTION    Hypertension     Osteoporosis     Thromboembolus ([facility]     RT. GROIN    Ulcerative colitis ([facility]     no symptoms since 1980s      Past Surgical History:   Procedure Laterality Date    HX CATARACT REMOVAL  1990'S    BILATERAL    HX GI      COLONOSCOPY    HX LUMBAR FUSION  3/2013    T10-S1 - Dr. Zulema Elizondo and Dr. Justin Montesinos    HX TONSILLECTOMY      HX TUBAL LIGATION      VT DILATE ESOPHAGUS  11/10/2015         UPPER GI ENDOSCOPY,BIOPSY  11/10/2015          Current Outpatient Medications   Medication Sig Dispense Refill    methylPREDNISolone (MEDROL DOSEPACK) 4 mg tablet use as directed 1 Dose Pack 0    omeprazole (PRILOSEC) 20 mg capsule TAKE 1 CAPSULE DAILY 90 Capsule 3    famotidine (PEPCID) 20 mg tablet TAKE 1 TABLET NIGHTLY 90 Tablet 3    atorvastatin (LIPITOR) 20 mg tablet TAKE 1 TABLET DAILY 90 Tablet 3    furosemide (LASIX) 20 mg tablet TAKE 1 TABLET DAILY 90 Tablet 3    amLODIPine (NORVASC) 5 mg tablet TAKE 1 TABLET DAILY 90 Tablet 3    chlorthalidone (HYGROTON) 25 mg tablet TAKE ONE-HALF (1/2) TABLET DAILY 45 Tablet 3    triamcinolone acetonide (KENALOG) 0.1 % ointment Apply  to affected area two (2) times a day. use thin layer 30 g 0    nebivoloL (BYSTOLIC) 10 mg tablet TAKE 1 TABLET DAILY 90 Tablet 3    losartan (COZAAR) 50 mg tablet TAKE 1 TABLET TWICE A  Tablet 3    celecoxib (CELEBREX) 200 mg capsule TAKE 1 CAPSULE DAILY 90 Cap 3    aspirin 81 mg chewable tablet Take 81 mg by mouth daily.       acetaminophen (Tylenol Extra Strength) 500 mg tablet Take  by mouth every six (6) hours as needed for Pain.  ondansetron (ZOFRAN ODT) 4 mg disintegrating tablet Take 1 Tab by mouth every eight (8) hours as needed for Nausea or Vomiting. 20 Tab 0    cholecalciferol, vitamin D3, (VITAMIN D3) 2,000 unit tab Take  by mouth daily.        No Known Allergies    Family History   Problem Relation Age of Onset    Heart Disease Mother     Hypertension Mother     Cancer Father         UNKNOWN    Liver Disease Sister     Cancer Brother         UNKNOWN    Diabetes Daughter     Hypertension Daughter     Diabetes Son     Hypertension Son    Scarlett Joe Diabetes Son     Liver Disease Son     Hypertension Son     Diabetes Son     Other Sister         SCLERODERMA    Anesth Problems Neg Hx      Social History     Tobacco Use    Smoking status: Never Smoker    Smokeless tobacco: Never Used   Substance Use Topics    Alcohol use: No         Valentina Medina MD

## 2022-03-29 NOTE — PROGRESS NOTES
Juliane Bui  Identified pt with two pt identifiers(name and ). Chief Complaint   Patient presents with    Blood Pressure Check     Room 2C //     Annual Wellness Visit       Reviewed record In preparation for visit and have obtained necessary documentation. 1. Have you been to the ER, urgent care clinic or hospitalized since your last visit? No     2. Have you seen or consulted any other health care providers outside of the [address] [place] [address] since your last visit? Include any pap smears or colon screening. No    Patient has an advance directive. Vitals reviewed with provider. Health Maintenance reviewed:     Health Maintenance Due   Topic    Medicare Yearly Exam           Wt Readings from Last 3 Encounters:   22 126 lb 9.6 oz (57.4 kg)   21 125 lb 12.8 oz (57.1 kg)   21 124 lb 12.8 oz (56.6 kg)        Temp Readings from Last 3 Encounters:   22 97.8 °F (36.6 °C) (Oral)   21 98.3 °F (36.8 °C) (Oral)   21 97.5 °F (36.4 °C) (Oral)        BP Readings from Last 3 Encounters:   22 (!) 160/52   21 (!) 144/80   21 (!) 144/74        Pulse Readings from Last 3 Encounters:   22 (!) 58   21 70   21 68        Vitals:    22 1059   BP: (!) 160/52   Pulse: (!) 58   Resp: 14   Temp: 97.8 °F (36.6 °C)   TempSrc: Oral   SpO2: 98%   Weight: 126 lb 9.6 oz (57.4 kg)   Height: 4' 8\" (1.422 m)   PainSc:   0 - No pain          Learning Assessment:   :       Learning Assessment 2019   PRIMARY LEARNER Patient   PRIMARY LANGUAGE ENGLISH   LEARNER PREFERENCE PRIMARY READING   ANSWERED BY patient   RELATIONSHIP SELF        Depression Screening:   :       3 most recent PHQ Screens 3/29/2022   Little interest or pleasure in doing things Not at all   Feeling down, depressed, irritable, or hopeless Not at all   Total Score PHQ 2 0        Fall Risk Assessment:   :       Fall Risk Assessment, last 12 mths 3/29/2022   Able to walk?  Yes Fall in past 12 months? 0   Do you feel unsteady? 1   Are you worried about falling 0        Abuse Screening:   :       Abuse Screening Questionnaire 3/29/2022 1/13/2020 10/9/2019 2/15/2018   Do you ever feel afraid of your partner? N N N N   Are you in a relationship with someone who physically or mentally threatens you? N N N N   Is it safe for you to go home?  Y Y Y Y        ADL Screening:   :       ADL Assessment 3/29/2022   Feeding yourself No Help Needed   Getting from bed to chair No Help Needed   Getting dressed No Help Needed   Bathing or showering No Help Needed   Walk across the room (includes cane/walker) No Help Needed   Using the telphone No Help Needed   Taking your medications No Help Needed   Preparing meals No Help Needed   Managing money (expenses/bills) No Help Needed   Moderately strenuous housework (laundry) No Help Needed   Shopping for personal items (toiletries/medicines) No Help Needed   Shopping for groceries Help Needed   Driving Help Needed   Climbing a flight of stairs No Help Needed   Getting to places beyond walking distances Help Needed

## 2022-03-29 NOTE — PATIENT INSTRUCTIONS
Medicare Wellness Visit, Female     The best way to live healthy is to have a lifestyle where you eat a well-balanced diet, exercise regularly, limit alcohol use, and quit all forms of tobacco/nicotine, if applicable. Regular preventive services are another way to keep healthy. Preventive services (vaccines, screening tests, monitoring & exams) can help personalize your care plan, which helps you manage your own care. Screening tests can find health problems at the earliest stages, when they are easiest to treat. [facility] follows the current, evidence-based guidelines published by the [facility] ([facility]) when recommending preventive services for our patients. Because we follow these guidelines, sometimes recommendations change over time as research supports it. (For example, mammograms used to be recommended annually. Even though Medicare will still pay for an annual mammogram, the newer guidelines recommend a mammogram every two years for women of average risk). Of course, you and your doctor may decide to screen more often for some diseases, based on your risk and your co-morbidities (chronic disease you are already diagnosed with). Preventive services for you include:  - Medicare offers their members a free annual wellness visit, which is time for you and your primary care provider to discuss and plan for your preventive service needs. Take advantage of this benefit every year!  -All adults over the age of 72 should receive the recommended pneumonia vaccines. Current USPSTF guidelines recommend a series of two vaccines for the best pneumonia protection.   -All adults should have a flu vaccine yearly and a tetanus vaccine every 10 years.   -All adults age 48 and older should receive the shingles vaccines (series of two vaccines).       -All adults age 38-68 who are overweight should have a diabetes screening test once every three years.   -All adults born between [birth year] and 1965 should be screened once for Hepatitis C.  -Other screening tests and preventive services for persons with diabetes include: an eye exam to screen for diabetic retinopathy, a kidney function test, a foot exam, and stricter control over your cholesterol.   -Cardiovascular screening for adults with routine risk involves an electrocardiogram (ECG) at intervals determined by your doctor.   -Colorectal cancer screenings should be done for adults age 54-65 with no increased risk factors for colorectal cancer. There are a number of acceptable methods of screening for this type of cancer. Each test has its own benefits and drawbacks. Discuss with your doctor what is most appropriate for you during your annual wellness visit. The different tests include: colonoscopy (considered the best screening method), a fecal occult blood test, a fecal DNA test, and sigmoidoscopy.    -A bone mass density test is recommended when a woman turns 65 to screen for osteoporosis. This test is only recommended one time, as a screening. Some providers will use this same test as a disease monitoring tool if you already have osteoporosis. -Breast cancer screenings are recommended every other year for women of normal risk, age 54-69.  -Cervical cancer screenings for women over age 72 are only recommended with certain risk factors. Here is a list of your current Health Maintenance items (your personalized list of preventive services) with a due date: There are no preventive care reminders to display for this patient.

## 2022-05-16 DIAGNOSIS — I10 ESSENTIAL HYPERTENSION: ICD-10-CM

## 2022-05-16 RX ORDER — CELECOXIB 200 MG/1
CAPSULE ORAL
Qty: 90 CAPSULE | Refills: 3 | Status: SHIPPED | OUTPATIENT
Start: 2022-05-16

## 2022-05-16 RX ORDER — LOSARTAN POTASSIUM 50 MG/1
TABLET ORAL
Qty: 180 TABLET | Refills: 3 | Status: SHIPPED | OUTPATIENT
Start: 2022-05-16

## 2022-07-29 ENCOUNTER — OFFICE VISIT (OUTPATIENT)
Dept: INTERNAL MEDICINE CLINIC | Age: 87
End: 2022-07-29
Payer: MEDICARE

## 2022-07-29 VITALS
RESPIRATION RATE: 16 BRPM | OXYGEN SATURATION: 97 % | HEART RATE: 58 BPM | HEIGHT: 56 IN | WEIGHT: 127.4 LBS | DIASTOLIC BLOOD PRESSURE: 70 MMHG | TEMPERATURE: 97.6 F | SYSTOLIC BLOOD PRESSURE: 130 MMHG | BODY MASS INDEX: 28.66 KG/M2

## 2022-07-29 DIAGNOSIS — R53.83 FATIGUE, UNSPECIFIED TYPE: ICD-10-CM

## 2022-07-29 DIAGNOSIS — R07.9 CHEST PAIN, UNSPECIFIED TYPE: ICD-10-CM

## 2022-07-29 DIAGNOSIS — G89.29 CHRONIC MIDLINE LOW BACK PAIN WITHOUT SCIATICA: ICD-10-CM

## 2022-07-29 DIAGNOSIS — I10 ESSENTIAL HYPERTENSION: ICD-10-CM

## 2022-07-29 DIAGNOSIS — G89.29 CHRONIC MIDLINE THORACIC BACK PAIN: ICD-10-CM

## 2022-07-29 DIAGNOSIS — M54.50 CHRONIC MIDLINE LOW BACK PAIN WITHOUT SCIATICA: ICD-10-CM

## 2022-07-29 DIAGNOSIS — M48.061 SPINAL STENOSIS OF LUMBAR REGION WITHOUT NEUROGENIC CLAUDICATION: ICD-10-CM

## 2022-07-29 DIAGNOSIS — M54.6 CHRONIC MIDLINE THORACIC BACK PAIN: ICD-10-CM

## 2022-07-29 DIAGNOSIS — R06.09 DOE (DYSPNEA ON EXERTION): Primary | ICD-10-CM

## 2022-07-29 PROCEDURE — G8432 DEP SCR NOT DOC, RNG: HCPCS | Performed by: INTERNAL MEDICINE

## 2022-07-29 PROCEDURE — 1101F PT FALLS ASSESS-DOCD LE1/YR: CPT | Performed by: INTERNAL MEDICINE

## 2022-07-29 PROCEDURE — 99214 OFFICE O/P EST MOD 30 MIN: CPT | Performed by: INTERNAL MEDICINE

## 2022-07-29 PROCEDURE — 1123F ACP DISCUSS/DSCN MKR DOCD: CPT | Performed by: INTERNAL MEDICINE

## 2022-07-29 PROCEDURE — G8427 DOCREV CUR MEDS BY ELIG CLIN: HCPCS | Performed by: INTERNAL MEDICINE

## 2022-07-29 PROCEDURE — 1090F PRES/ABSN URINE INCON ASSESS: CPT | Performed by: INTERNAL MEDICINE

## 2022-07-29 PROCEDURE — 93000 ELECTROCARDIOGRAM COMPLETE: CPT | Performed by: INTERNAL MEDICINE

## 2022-07-29 PROCEDURE — G8417 CALC BMI ABV UP PARAM F/U: HCPCS | Performed by: INTERNAL MEDICINE

## 2022-07-29 PROCEDURE — G8536 NO DOC ELDER MAL SCRN: HCPCS | Performed by: INTERNAL MEDICINE

## 2022-07-29 RX ORDER — TRAMADOL HYDROCHLORIDE 50 MG/1
50 TABLET ORAL
Qty: 30 TABLET | Refills: 0 | Status: SHIPPED | OUTPATIENT
Start: 2022-07-29 | End: 2022-08-05

## 2022-07-29 NOTE — PROGRESS NOTES
HPI  Ms. Ramu Gonzalez is a 80y.o. year old female, she is seen today for follow up cholesterol, HTN. No sob at rest, has had some dyspnea on exertion. Has had a few spells of chest pain up to face, lasts a few minutes take aspirin. Last about a month ago, had them prior to stroke but not since until last few months. Hasn't checked bp during a spell. Spells of chest pain occur at rest normally, not with sob. Back pain has been worse as well - ie while standing will get pain in thoracic region which radiates to chest. Has been trying to work on increasing stamina and has been walking around yard. No PND or orthopnea. Left leg stays swollen - improved with lasix 20mg daily but still has swelling. Chief Complaint   Patient presents with    Cholesterol Problem     Room 2A //         Prior to Admission medications    Medication Sig Start Date End Date Taking? Authorizing Provider   traMADoL (ULTRAM) 50 mg tablet Take 1 Tablet by mouth every six (6) hours as needed for Pain for up to 7 days. Max Daily Amount: 200 mg. 7/29/22 8/5/22 Yes Rowan Anne MD   losartan (COZAAR) 50 mg tablet TAKE 1 TABLET TWICE A DAY 5/16/22  Yes Rowan Anne MD   celecoxib (CELEBREX) 200 mg capsule TAKE 1 CAPSULE DAILY 5/16/22  Yes Rowan Anne MD   omeprazole (PRILOSEC) 20 mg capsule TAKE 1 CAPSULE DAILY 3/28/22  Yes Rowan Anne MD   famotidine (PEPCID) 20 mg tablet TAKE 1 TABLET NIGHTLY 2/14/22  Yes Rowan Anne MD   atorvastatin (LIPITOR) 20 mg tablet TAKE 1 TABLET DAILY 2/3/22  Yes Rowan Anne MD   furosemide (LASIX) 20 mg tablet TAKE 1 TABLET DAILY 1/27/22  Yes Rowan Anne MD   amLODIPine (NORVASC) 5 mg tablet TAKE 1 TABLET DAILY 1/19/22  Yes Rowan Anne MD   chlorthalidone (HYGROTON) 25 mg tablet TAKE ONE-HALF (1/2) TABLET DAILY 1/12/22  Yes Rowan Anne MD   triamcinolone acetonide (KENALOG) 0.1 % ointment Apply  to affected area two (2) times a day.  use thin layer 11/24/21  Yes Gayle Miguel MD   nebivoloL (BYSTOLIC) 10 mg tablet TAKE 1 TABLET DAILY 10/1/21  Yes Gayle Miguel MD   aspirin 81 mg chewable tablet Take 81 mg by mouth daily. Yes Provider, Historical   acetaminophen (TYLENOL) 500 mg tablet Take  by mouth every six (6) hours as needed for Pain. Yes Provider, Historical   ondansetron (ZOFRAN ODT) 4 mg disintegrating tablet Take 1 Tab by mouth every eight (8) hours as needed for Nausea or Vomiting. 5/14/20  Yes Gayle Miguel MD   cholecalciferol, vitamin D3, 50 mcg (2,000 unit) tab Take  by mouth daily. Yes Provider, Historical   traMADoL (ULTRAM) 50 mg tablet Take 1 Tab by mouth every six (6) hours as needed for Pain for up to 7 days. Max Daily Amount: 200 mg. 4/21/21 7/29/22  Gayle Miguel MD         No Known Allergies      REVIEW OF SYSTEMS:  Per HPI    PHYSICAL EXAM:  Visit Vitals  /70   Pulse (!) 58   Temp 97.6 °F (36.4 °C) (Oral)   Resp 16   Ht 4' 8\" (1.422 m)   Wt 127 lb 6.4 oz (57.8 kg)   SpO2 97%   BMI 28.56 kg/m²     Constitutional: Appears well-developed and well-nourished. No distress. HENT:   Head: Normocephalic and atraumatic. Eyes: No scleral icterus. Neck: no lad, no tm, supple   Cardiovascular: Normal S1/S2, regular rhythm. No murmurs, rubs, or gallops. Pulmonary/Chest: Effort normal and breath sounds normal. No respiratory distress. No wheezes, rhonchi, or rales. Back: mild pain on palpation lumbar spine  Abdomen: Soft, NT/ND, +BS, no rebound or guarding, no masses, no HSM appreciated. Ext: No edema. Neurological: Alert. Psychiatric: Normal mood and affect.  Behavior is normal.     Lab Results   Component Value Date/Time    Sodium 140 08/31/2021 10:47 AM    Potassium 4.5 08/31/2021 10:47 AM    Chloride 101 08/31/2021 10:47 AM    CO2 26 08/31/2021 10:47 AM    Anion gap 9 03/26/2014 05:00 AM    Glucose 94 08/31/2021 10:47 AM    BUN 33 08/31/2021 10:47 AM    Creatinine 1.52 (H) 08/31/2021 10:47 AM    BUN/Creatinine ratio 22 08/31/2021 10:47 AM    GFR est AA 34 (L) 08/31/2021 10:47 AM    GFR est non-AA 30 (L) 08/31/2021 10:47 AM    Calcium 9.5 08/31/2021 10:47 AM    Bilirubin, total 0.7 08/31/2021 10:47 AM    Alk. phosphatase 108 08/31/2021 10:47 AM    Protein, total 7.3 08/31/2021 10:47 AM    Albumin 4.3 08/31/2021 10:47 AM    Globulin 3.3 03/12/2013 03:45 AM    A-G Ratio 1.4 08/31/2021 10:47 AM    ALT (SGPT) 13 08/31/2021 10:47 AM     Lab Results   Component Value Date/Time    Hemoglobin A1c 5.1 03/07/2014 01:35 PM    Hemoglobin A1c 5.4 02/25/2013 02:09 PM      Lab Results   Component Value Date/Time    Cholesterol, total 213 (H) 08/31/2021 10:47 AM    HDL Cholesterol 79 08/31/2021 10:47 AM    LDL, calculated 115 (H) 08/31/2021 10:47 AM    LDL, calculated 53 12/20/2018 12:05 PM    VLDL, calculated 19 08/31/2021 10:47 AM    VLDL, calculated 15 12/20/2018 12:05 PM    Triglyceride 108 08/31/2021 10:47 AM          ASSESSMENT/PLAN  Diagnoses and all orders for this visit:    1. CARMEN (dyspnea on exertion)  -     REFERRAL TO CARDIOLOGY  -     [facility] POC EKG ROUTINE W/ 12 LEADS, INTER & REP  -     METABOLIC PANEL, COMPREHENSIVE; Future  -     CBC WITH AUTOMATED DIFF; Future  -     LIPID PANEL; Future  -     TSH 3RD GENERATION; Future  -     T4, FREE; Future    2. Chest pain, unspecified type  -     REFERRAL TO CARDIOLOGY  -     AMB POC EKG ROUTINE W/ 12 LEADS, INTER & REP  -     METABOLIC PANEL, COMPREHENSIVE; Future  -     CBC WITH AUTOMATED DIFF; Future  -     LIPID PANEL; Future  -     TSH 3RD GENERATION; Future  -     T4, FREE; Future    3. Essential hypertension  -     METABOLIC PANEL, COMPREHENSIVE; Future  -     CBC WITH AUTOMATED DIFF; Future  -     LIPID PANEL; Future  -     TSH 3RD GENERATION; Future  -     T4, FREE; Future    4. Fatigue, unspecified type  -     METABOLIC PANEL, COMPREHENSIVE; Future  -     CBC WITH AUTOMATED DIFF; Future  -     LIPID PANEL; Future  -     TSH 3RD GENERATION;  Future  - T4, FREE; Future    5. Chronic midline thoracic back pain  -     XR SPINE THORAC 3 V; Future    6. Chronic midline low back pain without sciatica  -     XR SPINE LUMB 2 OR 3 V; Future    7. Spinal stenosis of lumbar region without neurogenic claudication  -     traMADoL (ULTRAM) 50 mg tablet; Take 1 Tablet by mouth every six (6) hours as needed for Pain for up to 7 days. Max Daily Amount: 200 mg. EKG NSR  Has worsening CARMEN and now chest pain - differential includes cardiac, GI, MSK in nature  Refer to cardiology for further evaluation  Check labs  Tramadol prn severe pain -  checked  If pain not better or worse get films - okay to wait for now  There are no preventive care reminders to display for this patient. Follow-up and Dispositions    Return in about 3 months (around 10/29/2022), or if symptoms worsen or fail to improve, for bp, 30. Reviewed plan of care. Patient has provided input and agrees with goals. The nurse provided the patient and/or family with advanced directive information if needed and encouraged the patient to provide a copy to the office when available.

## 2022-07-29 NOTE — PROGRESS NOTES
Juliane Bui  Identified pt with two pt identifiers(name and ). Chief Complaint   Patient presents with    Cholesterol Problem     Room 2A //        Reviewed record In preparation for visit and have obtained necessary documentation. 1. Have you been to the ER, urgent care clinic or hospitalized since your last visit? No     2. Have you seen or consulted any other health care providers outside of the [address] [place] [address] since your last visit? Include any pap smears or colon screening. No    Patient has an advance directive. Vitals reviewed with provider. Health Maintenance reviewed:     Health Maintenance Due   Topic    COVID-19 Vaccine (4 - Booster for Moderna series)          Wt Readings from Last 3 Encounters:   22 127 lb 6.4 oz (57.8 kg)   22 126 lb 9.6 oz (57.4 kg)   21 125 lb 12.8 oz (57.1 kg)        Temp Readings from Last 3 Encounters:   22 97.8 °F (36.6 °C) (Oral)   21 98.3 °F (36.8 °C) (Oral)   21 97.5 °F (36.4 °C) (Oral)        BP Readings from Last 3 Encounters:   22 (!) 148/62   21 (!) 144/80   21 (!) 144/74        Pulse Readings from Last 3 Encounters:   22 (!) 58   21 70   21 68        Vitals:    22 1125   Resp: 16   Weight: 127 lb 6.4 oz (57.8 kg)   Height: 4' 8\" (1.422 m)   PainSc:   0 - No pain          Learning Assessment:   :       Learning Assessment 2019   PRIMARY LEARNER Patient   PRIMARY LANGUAGE ENGLISH   LEARNER PREFERENCE PRIMARY READING   ANSWERED BY patient   RELATIONSHIP SELF        Depression Screening:   :       3 most recent PHQ Screens 3/29/2022   Little interest or pleasure in doing things Not at all   Feeling down, depressed, irritable, or hopeless Not at all   Total Score PHQ 2 0        Fall Risk Assessment:   :       Fall Risk Assessment, last 12 mths 3/29/2022   Able to walk? Yes   Fall in past 12 months? 0   Do you feel unsteady?  1   Are you worried about falling 0 Abuse Screening:   :       Abuse Screening Questionnaire 3/29/2022 1/13/2020 10/9/2019 2/15/2018   Do you ever feel afraid of your partner? N N N N   Are you in a relationship with someone who physically or mentally threatens you? N N N N   Is it safe for you to go home?  Y Y Y Y        ADL Screening:   :       ADL Assessment 3/29/2022   Feeding yourself No Help Needed   Getting from bed to chair No Help Needed   Getting dressed No Help Needed   Bathing or showering No Help Needed   Walk across the room (includes cane/walker) No Help Needed   Using the telphone No Help Needed   Taking your medications No Help Needed   Preparing meals No Help Needed   Managing money (expenses/bills) No Help Needed   Moderately strenuous housework (laundry) No Help Needed   Shopping for personal items (toiletries/medicines) No Help Needed   Shopping for groceries Help Needed   Driving Help Needed   Climbing a flight of stairs No Help Needed   Getting to places beyond walking distances Help Needed

## 2022-07-29 NOTE — PATIENT INSTRUCTIONS
I ordered x-rays of your back to evaluate due to chronic pain. I ordered lab work. I refilled tramadol for back pain. I am referring you to cardiology for the worsening shortness of breath you have been experiencing. Your EKG was similar to that a year ago.

## 2022-08-18 LAB
ALBUMIN SERPL-MCNC: 4.4 G/DL (ref 3.5–4.6)
ALBUMIN/GLOB SERPL: 1.8 {RATIO} (ref 1.2–2.2)
ALP SERPL-CCNC: 120 IU/L (ref 44–121)
ALT SERPL-CCNC: 17 IU/L (ref 0–32)
AST SERPL-CCNC: 18 IU/L (ref 0–40)
BILIRUB SERPL-MCNC: 0.9 MG/DL (ref 0–1.2)
BUN SERPL-MCNC: 30 MG/DL (ref 10–36)
BUN/CREAT SERPL: 25 (ref 12–28)
CALCIUM SERPL-MCNC: 9.9 MG/DL (ref 8.7–10.3)
CHLORIDE SERPL-SCNC: 104 MMOL/L (ref 96–106)
CHOLEST SERPL-MCNC: 143 MG/DL (ref 100–199)
CO2 SERPL-SCNC: 26 MMOL/L (ref 20–29)
CREAT SERPL-MCNC: 1.19 MG/DL (ref 0.57–1)
EGFR: 43 ML/MIN/1.73
GLOBULIN SER CALC-MCNC: 2.5 G/DL (ref 1.5–4.5)
GLUCOSE SERPL-MCNC: 94 MG/DL (ref 65–99)
HDLC SERPL-MCNC: 68 MG/DL
LDLC SERPL CALC-MCNC: 56 MG/DL (ref 0–99)
POTASSIUM SERPL-SCNC: 5.1 MMOL/L (ref 3.5–5.2)
PROT SERPL-MCNC: 6.9 G/DL (ref 6–8.5)
SODIUM SERPL-SCNC: 142 MMOL/L (ref 134–144)
SPECIMEN STATUS REPORT, ROLRST: NORMAL
TRIGL SERPL-MCNC: 109 MG/DL (ref 0–149)
VLDLC SERPL CALC-MCNC: 19 MG/DL (ref 5–40)

## 2022-08-30 ENCOUNTER — HOSPITAL ENCOUNTER (OUTPATIENT)
Dept: GENERAL RADIOLOGY | Age: 87
Discharge: HOME | End: 2022-08-30
Payer: MEDICARE

## 2022-08-30 DIAGNOSIS — M54.6 CHRONIC MIDLINE THORACIC BACK PAIN: ICD-10-CM

## 2022-08-30 DIAGNOSIS — G89.29 CHRONIC MIDLINE THORACIC BACK PAIN: ICD-10-CM

## 2022-08-30 DIAGNOSIS — M54.50 CHRONIC MIDLINE LOW BACK PAIN WITHOUT SCIATICA: ICD-10-CM

## 2022-08-30 DIAGNOSIS — G89.29 CHRONIC MIDLINE LOW BACK PAIN WITHOUT SCIATICA: ICD-10-CM

## 2022-08-30 PROCEDURE — 72100 X-RAY EXAM L-S SPINE 2/3 VWS: CPT

## 2022-08-30 PROCEDURE — 72072 X-RAY EXAM THORAC SPINE 3VWS: CPT

## 2022-09-01 NOTE — PROGRESS NOTES
This shows severe arthritis in the lower thoracic spine as well as a T10 compression fraction which is likely old.   If your pain is not controlled with tramadol let me know and we will investigate further

## 2022-09-12 ENCOUNTER — OFFICE VISIT (OUTPATIENT)
Dept: CARDIOLOGY CLINIC | Age: 87
End: 2022-09-12
Payer: MEDICARE

## 2022-09-12 VITALS
WEIGHT: 126.6 LBS | SYSTOLIC BLOOD PRESSURE: 190 MMHG | DIASTOLIC BLOOD PRESSURE: 56 MMHG | OXYGEN SATURATION: 97 % | RESPIRATION RATE: 18 BRPM | BODY MASS INDEX: 28.48 KG/M2 | HEART RATE: 83 BPM | HEIGHT: 56 IN

## 2022-09-12 DIAGNOSIS — Z86.73 HISTORY OF TRANSIENT ISCHEMIC ATTACK (TIA): ICD-10-CM

## 2022-09-12 DIAGNOSIS — I10 BENIGN ESSENTIAL HTN: ICD-10-CM

## 2022-09-12 DIAGNOSIS — I20.0 UNSTABLE ANGINA (HCC): Primary | ICD-10-CM

## 2022-09-12 DIAGNOSIS — M47.9 ARTHRITIS OF BACK: ICD-10-CM

## 2022-09-12 PROCEDURE — 1101F PT FALLS ASSESS-DOCD LE1/YR: CPT | Performed by: INTERNAL MEDICINE

## 2022-09-12 PROCEDURE — 1123F ACP DISCUSS/DSCN MKR DOCD: CPT | Performed by: INTERNAL MEDICINE

## 2022-09-12 PROCEDURE — G8510 SCR DEP NEG, NO PLAN REQD: HCPCS | Performed by: INTERNAL MEDICINE

## 2022-09-12 PROCEDURE — G8427 DOCREV CUR MEDS BY ELIG CLIN: HCPCS | Performed by: INTERNAL MEDICINE

## 2022-09-12 PROCEDURE — 99204 OFFICE O/P NEW MOD 45 MIN: CPT | Performed by: INTERNAL MEDICINE

## 2022-09-12 PROCEDURE — G8536 NO DOC ELDER MAL SCRN: HCPCS | Performed by: INTERNAL MEDICINE

## 2022-09-12 PROCEDURE — G8417 CALC BMI ABV UP PARAM F/U: HCPCS | Performed by: INTERNAL MEDICINE

## 2022-09-12 PROCEDURE — 1090F PRES/ABSN URINE INCON ASSESS: CPT | Performed by: INTERNAL MEDICINE

## 2022-09-12 NOTE — PROGRESS NOTES
[address]: Sylvia Armenta  [phone]    History of Present Illness:  Ms. Kar Gaxiola is a 79 yo F with a history of T10 compression fracture and severe arthritis of the spine, essential hypertension, history of mini-stroke referred by Dr. Trenton Jc for cardiac evaluation. She is here due to shortness of breath with exertion that has been progressive over the past year. She denies any chest pain. Sometimes just walking across the room, she will get more short of breath. She is limited due to her issues with her back. She denies any prior cardiac history and does think she had some testing done on her heart at the time of her back surgery and workup there was okay. She denies any significant palpitations, lightheadedness or dizziness. She is compensated on exam with clear lungs and no lower extremity edema, I/VI systolic murmur at the left sternal border. Assessment and Plan:   1. Unstable angina. Shortness of breath with exertion, possible anginal equivalent and multiple risk factors; will proceed with an echocardiogram and stress test for further evaluation. Due to her issues with her back, she cannot fully complete a treadmill and will do this Lexiscan. 2. Essential hypertension. Blood pressure has been labile, but overall okay resting at home. It is mildly elevated here and she probably has some degree of white coat hypertension. 3. History of TIA. She  has a past medical history of Arthritis, Beta-blocker therapy, Chronic pain, GERD (gastroesophageal reflux disease), History of blood transfusion (3/2013), Hypertension, Osteoporosis, Thrombo[address]olus ([facility] [address] and Ulcerative colitis ([facility] [address] All other systems negative except as above. PE  Vitals:    09/12/22 1306 09/12/22 1320   BP: (!) 164/80 (!) 190/56   Pulse: 83    Resp: 18    SpO2: 97%    Weight: 126 lb 9.6 oz (57.4 kg)    Height: 4' 8\" (1.422 m)     Body mass index is 28.38 kg/m².   General appearance - alert, well appearing, and in no distress  Mental status - affect appropriate to mood  Eyes - sclera anicteric, moist mucous membranes  Neck - supple, no JVD  Chest - clear to auscultation, no wheezes, rales or rhonchi  Heart - normal rate, regular rhythm, normal S1, S2, I/VI systolic murmur LUSB  Abdomen - soft, nontender, nondistended, no masses or organomegaly  Back exam - full range of motion, no tenderness  Neurological - no focal deficits  Extremities - peripheral pulses normal, no pedal edema      Recent Labs:  Lab Results   Component Value Date/Time    Cholesterol, total 143 07/13/2022 11:08 AM    HDL Cholesterol 68 07/13/2022 11:08 AM    LDL, calculated 56 07/13/2022 11:08 AM    LDL, calculated 53 12/20/2018 12:05 PM    Triglyceride 109 07/13/2022 11:08 AM     Lab Results   Component Value Date/Time    Creatinine 1.19 (H) 07/13/2022 11:08 AM     Lab Results   Component Value Date/Time    BUN 30 07/13/2022 11:08 AM     Lab Results   Component Value Date/Time    Potassium 5.1 07/13/2022 11:08 AM     Lab Results   Component Value Date/Time    Hemoglobin A1c 5.1 03/07/2014 01:35 PM     Lab Results   Component Value Date/Time    HGB 12.4 08/31/2021 10:47 AM     Lab Results   Component Value Date/Time    PLATELET 552 72/63/5610 10:47 AM       Reviewed:  Past Medical History:   Diagnosis Date    Arthritis     Beta-blocker therapy     Chronic pain     BACK    GERD (gastroesophageal reflux disease)     History of blood transfusion 3/2013    ST[address]  [address], NO REACTION    Hypertension     Osteoporosis     Thromboembolus ([facility] [address]     RT. GROIN    Ulcerative colitis ([facility] [address]     no symptoms since 46s     Social History     Tobacco Use   Smoking Status Never   Smokeless Tobacco Never     Social History     Substance and Sexual Activity   Alcohol Use No     No Known Allergies    Current Outpatient Medications   Medication Sig    losartan (COZAAR) 50 mg tablet TAKE 1 TABLET TWICE A DAY    celecoxib (CELEBREX) 200 mg capsule TAKE 1 CAPSULE DAILY omeprazole (PRILOSEC) 20 mg capsule TAKE 1 CAPSULE DAILY    famotidine (PEPCID) 20 mg tablet TAKE 1 TABLET NIGHTLY    atorvastatin (LIPITOR) 20 mg tablet TAKE 1 TABLET DAILY    furosemide (LASIX) 20 mg tablet TAKE 1 TABLET DAILY    amLODIPine (NORVASC) 5 mg tablet TAKE 1 TABLET DAILY    chlorthalidone (HYGROTON) 25 mg tablet TAKE ONE-HALF (1/2) TABLET DAILY    triamcinolone acetonide (KENALOG) 0.1 % ointment Apply  to affected area two (2) times a day. use thin layer    nebivoloL (BYSTOLIC) 10 mg tablet TAKE 1 TABLET DAILY    aspirin 81 mg chewable tablet Take 81 mg by mouth daily. acetaminophen (TYLENOL) 500 mg tablet Take  by mouth every six (6) hours as needed for Pain. ondansetron (ZOFRAN ODT) 4 mg disintegrating tablet Take 1 Tab by mouth every eight (8) hours as needed for Nausea or Vomiting. cholecalciferol, vitamin D3, 50 mcg (2,000 unit) tab Take  by mouth daily. No current facility-administered medications for this visit.        Heath Miller MD  [facility] heart and [facility] [place]  [address] [facility] [address]  [address]

## 2022-09-12 NOTE — LETTER
Patient:  Basia Brock   YOB: 1930  Date of Visit: 9/12/2022      Dear Princess Epps MD  [place] [phone]  Via In Basket:      Ms. Maggy Josue is a 79 yo F with a history of T10 compression fracture and severe arthritis of the spine, essential hypertension, history of mini-stroke referred by Dr. Duncan Meléndez for cardiac evaluation. She is here due to shortness of breath with exertion that has been progressive over the past year. She denies any chest pain. Sometimes just walking across the room, she will get more short of breath. She is limited due to her issues with her back. She denies any prior cardiac history and does think she had some testing done on her heart at the time of her back surgery and workup there was okay. She denies any significant palpitations, lightheadedness or dizziness. She is compensated on exam with clear lungs and no lower extremity edema, I/VI systolic murmur at the left sternal border. Assessment and Plan:   1. Unstable angina. Shortness of breath with exertion, possible anginal equivalent and multiple risk factors; will proceed with an echocardiogram and stress test for further evaluation. Due to her issues with her back, she cannot fully complete a treadmill and will do this Lexiscan. 2. Essential hypertension. Blood pressure has been labile, but overall okay resting at home. It is mildly elevated here and she probably has some degree of white coat hypertension. 3. History of TIA. If you have questions, please do not hesitate to call me.      Sincerely,      Justin Villela MD

## 2022-09-12 NOTE — PROGRESS NOTES
[address]: Xavier  [phone]) [phone]    HPI: Amandeep Singh, a 80y.o. year-old who presents for evaluation of   8/30/22 This shows severe arthritis in the lower thoracic spine as well as a T10 compression fraction which is likely old. If your pain is not controlled with tramadol let me know and we will investigate further    7/29  cholesterol, HTN. No sob at rest, has had some dyspnea on exertion. Has had a few spells of chest pain up to face, lasts a few minutes take aspirin. Last about a month ago, had them prior to stroke but not since until last few months. Hasn't checked bp during a spell. Spells of chest pain occur at rest normally, not with sob    CP sob  9/2021 normla EF 65% mild AR    Assessment/Plan:    She  has a past medical history of Arthritis, Beta-blocker therapy, Chronic pain, GERD (gastroesophageal reflux disease), History of blood transfusion (3/2013), Hypertension, Osteoporosis, Thromboembolus ([address] and Ulcerative colitis ([address] All other systems negative except as above. PE  There were no vitals filed for this visit. There is no height or weight on file to calculate BMI.   General appearance - alert, well appearing, and in no distress  Mental status - affect appropriate to mood  Eyes - sclera anicteric, moist mucous membranes  Neck - supple, no JVD  Chest - clear to auscultation, no wheezes, rales or rhonchi  Heart - normal rate, regular rhythm, normal S1, S2, no murmurs, rubs, clicks or gallops  Abdomen - soft, nontender, nondistended, no masses or organomegaly  Back exam - full range of motion, no tenderness  Neurological - no focal deficits  Extremities - peripheral pulses normal, no pedal edema      Recent Labs:  Lab Results   Component Value Date/Time    Cholesterol, total 143 07/13/2022 11:08 AM    HDL Cholesterol 68 07/13/2022 11:08 AM    LDL, calculated 56 07/13/2022 11:08 AM    LDL, calculated 53 12/20/2018 12:05 PM    Triglyceride 109 07/13/2022 11:08 AM     Lab Results   Component Value Date/Time    Creatinine 1.19 (H) 07/13/2022 11:08 AM     Lab Results   Component Value Date/Time    BUN 30 07/13/2022 11:08 AM     Lab Results   Component Value Date/Time    Potassium 5.1 07/13/2022 11:08 AM     Lab Results   Component Value Date/Time    Hemoglobin A1c 5.1 03/07/2014 01:35 PM     Lab Results   Component Value Date/Time    HGB 12.4 08/31/2021 10:47 AM     Lab Results   Component Value Date/Time    PLATELET 513 46/54/3319 10:47 AM       Reviewed:  Past Medical History:   Diagnosis Date    Arthritis     Beta-blocker therapy     Chronic pain     BACK    GERD (gastroesophageal reflux disease)     History of blood transfusion 3/2013    ST[address], NO REACTION    Hypertension     Osteoporosis     Thromboembolus ([facility] [address]     RT. GROIN    Ulcerative colitis ([facility] [address]     no symptoms since 46s     Social History     Tobacco Use   Smoking Status Never   Smokeless Tobacco Never     Social History     Substance and Sexual Activity   Alcohol Use No     No Known Allergies    Current Outpatient Medications   Medication Sig    losartan (COZAAR) 50 mg tablet TAKE 1 TABLET TWICE A DAY    celecoxib (CELEBREX) 200 mg capsule TAKE 1 CAPSULE DAILY    omeprazole (PRILOSEC) 20 mg capsule TAKE 1 CAPSULE DAILY    famotidine (PEPCID) 20 mg tablet TAKE 1 TABLET NIGHTLY    atorvastatin (LIPITOR) 20 mg tablet TAKE 1 TABLET DAILY    furosemide (LASIX) 20 mg tablet TAKE 1 TABLET DAILY    amLODIPine (NORVASC) 5 mg tablet TAKE 1 TABLET DAILY    chlorthalidone (HYGROTON) 25 mg tablet TAKE ONE-HALF (1/2) TABLET DAILY    triamcinolone acetonide (KENALOG) 0.1 % ointment Apply  to affected area two (2) times a day. use thin layer    nebivoloL (BYSTOLIC) 10 mg tablet TAKE 1 TABLET DAILY    aspirin 81 mg chewable tablet Take 81 mg by mouth daily. acetaminophen (TYLENOL) 500 mg tablet Take  by mouth every six (6) hours as needed for Pain.     ondansetron (ZOFRAN ODT) 4 mg disintegrating tablet Take 1 Tab by mouth every eight (8) hours as needed for Nausea or Vomiting. cholecalciferol, vitamin D3, 50 mcg (2,000 unit) tab Take  by mouth daily. No current facility-administered medications for this visit.        Tao Renee MD  [facility] heart and Vascular [place]  [address] [facility] [address]  [address]

## 2022-09-26 DIAGNOSIS — I10 ESSENTIAL HYPERTENSION: ICD-10-CM

## 2022-09-26 RX ORDER — NEBIVOLOL 10 MG/1
TABLET ORAL
Qty: 90 TABLET | Refills: 3 | Status: SHIPPED | OUTPATIENT
Start: 2022-09-26

## 2022-10-20 ENCOUNTER — ANCILLARY PROCEDURE (OUTPATIENT)
Dept: CARDIOLOGY CLINIC | Age: 87
End: 2022-10-20

## 2022-10-20 ENCOUNTER — ANCILLARY PROCEDURE (OUTPATIENT)
Dept: CARDIOLOGY CLINIC | Age: 87
End: 2022-10-20
Payer: MEDICARE

## 2022-10-20 VITALS
HEIGHT: 56 IN | BODY MASS INDEX: 28.34 KG/M2 | WEIGHT: 126 LBS | DIASTOLIC BLOOD PRESSURE: 84 MMHG | SYSTOLIC BLOOD PRESSURE: 140 MMHG

## 2022-10-20 DIAGNOSIS — I20.0 UNSTABLE ANGINA (HCC): ICD-10-CM

## 2022-10-20 DIAGNOSIS — Z86.73 HISTORY OF TRANSIENT ISCHEMIC ATTACK (TIA): ICD-10-CM

## 2022-10-20 DIAGNOSIS — I10 BENIGN ESSENTIAL HTN: ICD-10-CM

## 2022-10-20 DIAGNOSIS — M47.9 ARTHRITIS OF BACK: ICD-10-CM

## 2022-10-20 LAB
ECHO AO ASC DIAM: 2.5 CM
ECHO AO ASCENDING AORTA INDEX: 1.71 CM/M2
ECHO AO ROOT DIAM: 2.3 CM
ECHO AO ROOT INDEX: 1.58 CM/M2
ECHO AV AREA PEAK VELOCITY: 2.1 CM2
ECHO AV AREA VTI: 2.1 CM2
ECHO AV AREA/BSA PEAK VELOCITY: 1.4 CM2/M2
ECHO AV AREA/BSA VTI: 1.4 CM2/M2
ECHO AV MEAN GRADIENT: 2 MMHG
ECHO AV MEAN VELOCITY: 0.7 M/S
ECHO AV PEAK GRADIENT: 4 MMHG
ECHO AV PEAK VELOCITY: 1 M/S
ECHO AV VELOCITY RATIO: 0.8
ECHO AV VTI: 23.8 CM
ECHO IVC PROX: 2 CM
ECHO LA DIAMETER INDEX: 1.99 CM/M2
ECHO LA DIAMETER: 2.9 CM
ECHO LA TO AORTIC ROOT RATIO: 1.26
ECHO LA VOL 2C: 29 ML (ref 22–52)
ECHO LA VOL 4C: 44 ML (ref 22–52)
ECHO LA VOL BP: 43 ML (ref 22–52)
ECHO LA VOL/BSA BIPLANE: 29 ML/M2 (ref 16–34)
ECHO LA VOLUME AREA LENGTH: 49 ML
ECHO LA VOLUME INDEX A2C: 20 ML/M2 (ref 16–34)
ECHO LA VOLUME INDEX A4C: 30 ML/M2 (ref 16–34)
ECHO LA VOLUME INDEX AREA LENGTH: 34 ML/M2 (ref 16–34)
ECHO LV E' LATERAL VELOCITY: 9 CM/S
ECHO LV FRACTIONAL SHORTENING: 32 % (ref 28–44)
ECHO LV INTERNAL DIMENSION DIASTOLE INDEX: 2.33 CM/M2
ECHO LV INTERNAL DIMENSION DIASTOLIC: 3.4 CM (ref 3.9–5.3)
ECHO LV INTERNAL DIMENSION SYSTOLIC INDEX: 1.58 CM/M2
ECHO LV INTERNAL DIMENSION SYSTOLIC: 2.3 CM
ECHO LV IVSD: 1.2 CM (ref 0.6–0.9)
ECHO LV MASS 2D: 138.8 G (ref 67–162)
ECHO LV MASS INDEX 2D: 95.1 G/M2 (ref 43–95)
ECHO LV POSTERIOR WALL DIASTOLIC: 1.3 CM (ref 0.6–0.9)
ECHO LV RELATIVE WALL THICKNESS RATIO: 0.76
ECHO LVOT AREA: 2.5 CM2
ECHO LVOT AV VTI INDEX: 0.84
ECHO LVOT DIAM: 1.8 CM
ECHO LVOT MEAN GRADIENT: 1 MMHG
ECHO LVOT PEAK GRADIENT: 3 MMHG
ECHO LVOT PEAK VELOCITY: 0.8 M/S
ECHO LVOT STROKE VOLUME INDEX: 34.7 ML/M2
ECHO LVOT SV: 50.6 ML
ECHO LVOT VTI: 19.9 CM
ECHO MV A VELOCITY: 0.84 M/S
ECHO MV AREA PHT: 3.2 CM2
ECHO MV E DECELERATION TIME (DT): 239.5 MS
ECHO MV E VELOCITY: 0.62 M/S
ECHO MV E/A RATIO: 0.74
ECHO MV E/E' LATERAL: 6.89
ECHO MV PRESSURE HALF TIME (PHT): 69.5 MS
ECHO RV INTERNAL DIMENSION: 3.2 CM
ECHO RV TAPSE: 1.9 CM (ref 1.7–?)
NUC STRESS EJECTION FRACTION: 69 %
STRESS BASELINE DIAS BP: 84 MMHG
STRESS BASELINE HR: 66 BPM
STRESS BASELINE ST DEPRESSION: 0 MM
STRESS BASELINE SYS BP: 140 MMHG
STRESS O2 SAT PEAK: 100 %
STRESS O2 SAT REST: 97 %
STRESS PEAK DIAS BP: 60 MMHG
STRESS PEAK SYS BP: 134 MMHG
STRESS PERCENT HR ACHIEVED: 66 %
STRESS POST PEAK HR: 85 BPM
STRESS RATE PRESSURE PRODUCT: NORMAL BPM*MMHG
STRESS ST DEPRESSION: 0 MM
STRESS TARGET HR: 128 BPM

## 2022-10-20 PROCEDURE — 78452 HT MUSCLE IMAGE SPECT MULT: CPT | Performed by: INTERNAL MEDICINE

## 2022-10-20 PROCEDURE — A9500 TC99M SESTAMIBI: HCPCS | Performed by: INTERNAL MEDICINE

## 2022-10-20 PROCEDURE — 93015 CV STRESS TEST SUPVJ I&R: CPT | Performed by: INTERNAL MEDICINE

## 2022-10-20 PROCEDURE — 93306 TTE W/DOPPLER COMPLETE: CPT | Performed by: INTERNAL MEDICINE

## 2022-10-20 RX ORDER — TETRAKIS(2-METHOXYISOBUTYLISOCYANIDE)COPPER(I) TETRAFLUOROBORATE 1 MG/ML
30 INJECTION, POWDER, LYOPHILIZED, FOR SOLUTION INTRAVENOUS ONCE
Status: COMPLETED | OUTPATIENT
Start: 2022-10-20 | End: 2022-10-20

## 2022-10-20 RX ORDER — TETRAKIS(2-METHOXYISOBUTYLISOCYANIDE)COPPER(I) TETRAFLUOROBORATE 1 MG/ML
10 INJECTION, POWDER, LYOPHILIZED, FOR SOLUTION INTRAVENOUS ONCE
Status: COMPLETED | OUTPATIENT
Start: 2022-10-20 | End: 2022-10-20

## 2022-10-20 RX ADMIN — TETRAKIS(2-METHOXYISOBUTYLISOCYANIDE)COPPER(I) TETRAFLUOROBORATE 8.5 MILLICURIE: 1 INJECTION, POWDER, LYOPHILIZED, FOR SOLUTION INTRAVENOUS at 08:19

## 2022-10-20 RX ADMIN — TETRAKIS(2-METHOXYISOBUTYLISOCYANIDE)COPPER(I) TETRAFLUOROBORATE 25.4 MILLICURIE: 1 INJECTION, POWDER, LYOPHILIZED, FOR SOLUTION INTRAVENOUS at 09:30

## 2022-10-21 ENCOUNTER — TELEPHONE (OUTPATIENT)
Dept: CARDIOLOGY CLINIC | Age: 87
End: 2022-10-21

## 2022-10-21 NOTE — TELEPHONE ENCOUNTER
MD Suad Grossman, WOODY  Please let pt know stress test and echo were normal. Thx    Two patient identifiers verified. Per MD patient called and given results. Confirmed follow up. Patient verbalized understanding and denies any further questions or concerns at this time.        Future Appointments   Date Time Provider Faisal Flores   10/31/2022  2:30 PM Miguel A Carroll MD [facility] BS AMB

## 2022-10-31 ENCOUNTER — OFFICE VISIT (OUTPATIENT)
Dept: INTERNAL MEDICINE CLINIC | Age: 87
End: 2022-10-31
Payer: MEDICARE

## 2022-10-31 VITALS
OXYGEN SATURATION: 97 % | TEMPERATURE: 98.2 F | WEIGHT: 126 LBS | BODY MASS INDEX: 28.34 KG/M2 | HEART RATE: 78 BPM | SYSTOLIC BLOOD PRESSURE: 180 MMHG | RESPIRATION RATE: 12 BRPM | HEIGHT: 56 IN | DIASTOLIC BLOOD PRESSURE: 64 MMHG

## 2022-10-31 DIAGNOSIS — I10 ESSENTIAL HYPERTENSION: ICD-10-CM

## 2022-10-31 DIAGNOSIS — S22.070D COMPRESSION FRACTURE OF T10 VERTEBRA WITH ROUTINE HEALING, SUBSEQUENT ENCOUNTER: Primary | ICD-10-CM

## 2022-10-31 DIAGNOSIS — Z23 NEEDS FLU SHOT: ICD-10-CM

## 2022-10-31 PROCEDURE — 1123F ACP DISCUSS/DSCN MKR DOCD: CPT | Performed by: INTERNAL MEDICINE

## 2022-10-31 PROCEDURE — 1090F PRES/ABSN URINE INCON ASSESS: CPT | Performed by: INTERNAL MEDICINE

## 2022-10-31 PROCEDURE — G8417 CALC BMI ABV UP PARAM F/U: HCPCS | Performed by: INTERNAL MEDICINE

## 2022-10-31 PROCEDURE — G8427 DOCREV CUR MEDS BY ELIG CLIN: HCPCS | Performed by: INTERNAL MEDICINE

## 2022-10-31 PROCEDURE — 1101F PT FALLS ASSESS-DOCD LE1/YR: CPT | Performed by: INTERNAL MEDICINE

## 2022-10-31 PROCEDURE — 90694 VACC AIIV4 NO PRSRV 0.5ML IM: CPT | Performed by: INTERNAL MEDICINE

## 2022-10-31 PROCEDURE — G8536 NO DOC ELDER MAL SCRN: HCPCS | Performed by: INTERNAL MEDICINE

## 2022-10-31 PROCEDURE — G8432 DEP SCR NOT DOC, RNG: HCPCS | Performed by: INTERNAL MEDICINE

## 2022-10-31 PROCEDURE — 99214 OFFICE O/P EST MOD 30 MIN: CPT | Performed by: INTERNAL MEDICINE

## 2022-10-31 PROCEDURE — G0008 ADMIN INFLUENZA VIRUS VAC: HCPCS | Performed by: INTERNAL MEDICINE

## 2022-10-31 NOTE — PATIENT INSTRUCTIONS
Vaccine Information Statement    Influenza (Flu) Vaccine (Inactivated or Recombinant): What You Need to Know    Many vaccine information statements are available in [place] and other languages. See www.immunize.org/vis. Hojas de información sobre vacunas están disponibles en español y en muchos otros idiomas. Visite www.immunize.org/vis. 1. Why get vaccinated? Influenza vaccine can prevent influenza (flu). Flu is a contagious disease that spreads around the [facility] every year, usually between October and May. Anyone can get the flu, but it is more dangerous for some people. Infants and young children, people 72 years and older, pregnant people, and people with certain health conditions or a weakened immune system are at greatest risk of flu complications. Pneumonia, bronchitis, sinus infections, and ear infections are examples of flu-related complications. If you have a medical condition, such as heart disease, cancer, or diabetes, flu can make it worse. Flu can cause fever and chills, sore throat, muscle aches, fatigue, cough, headache, and runny or stuffy nose. Some people may have vomiting and diarrhea, though this is more common in children than adults. In an average year, thousands of people in the [facility] die from flu, and many more are hospitalized. Flu vaccine prevents millions of illnesses and flu-related visits to the doctor each year. 2. Influenza vaccines     CDC recommends everyone 6 months and older get vaccinated every flu season. Children 6 months through 6years of age may need 2 doses during a single flu season. Everyone else needs only 1 dose each flu season. It takes about 2 weeks for protection to develop after vaccination. There are many flu viruses, and they are always changing. Each year a new flu vaccine is made to protect against the influenza viruses believed to be likely to cause disease in the upcoming flu season.  Even when the vaccine doesnt exactly match these viruses, it may still provide some protection. Influenza vaccine does not cause flu. Influenza vaccine may be given at the same time as other vaccines. 3. Talk with your health care provider    Tell your vaccination provider if the person getting the vaccine:  Has had an allergic reaction after a previous dose of influenza vaccine, or has any severe, life-threatening allergies   Has ever had Guillain-Barré Syndrome (also called GBS)    In some cases, your health care provider may decide to postpone influenza vaccination until a future visit. Influenza vaccine can be administered at any time during pregnancy. People who are or will be pregnant during influenza season should receive inactivated influenza vaccine. People with minor illnesses, such as a cold, may be vaccinated. People who are moderately or severely ill should usually wait until they recover before getting influenza vaccine. Your health care provider can give you more information. 4. Risks of a vaccine reaction    Soreness, redness, and swelling where the shot is given, fever, muscle aches, and headache can happen after influenza vaccination. There may be a very small increased risk of Guillain-Barré Syndrome (GBS) after inactivated influenza vaccine (the flu shot). Victorino Ashing children who get the flu shot along with pneumococcal vaccine (PCV13) and/or DTaP vaccine at the same time might be slightly more likely to have a seizure caused by fever. Tell your health care provider if a child who is getting flu vaccine has ever had a seizure. People sometimes faint after medical procedures, including vaccination. Tell your provider if you feel dizzy or have vision changes or ringing in the ears. As with any medicine, there is a very remote chance of a vaccine causing a severe allergic reaction, other serious injury, or death. 5. What if there is a serious problem?     An allergic reaction could occur after the vaccinated person leaves the clinic. If you see signs of a severe allergic reaction (hives, swelling of the face and throat, difficulty breathing, a fast heartbeat, dizziness, or weakness), call 9-1-1 and get the person to the nearest hospital.    For other signs that concern you, call your health care provider. Adverse reactions should be reported to the Vaccine Adverse Event Reporting System (VAERS). Your health care provider will usually file this report, or you can do it yourself. Visit the VAERS website at www.vaers. [facility].gov or call 4-141.129.1132. VAERS is only for reporting reactions, and VAERS staff members do not give medical advice. 6. The National Vaccine Injury Compensation Program    The [facility] Vaccine Injury Compensation Program (VICP) is a federal program that was created to compensate people who may have been injured by certain vaccines. Claims regarding alleged injury or death due to vaccination have a time limit for filing, which may be as short as two years. Visit the VICP website at www.[facility].gov/vaccinecompensation or call 2-379.617.9243 to learn about the program and about filing a claim. 7. How can I learn more? Ask your health care provider. Call your local or state health department. Visit the website of the Food and Drug Administration (FDA) for vaccine package inserts and additional information at www.fda.gov/vaccines-blood-biologics/vaccines. Contact the Centers for Disease Control and Prevention (CDC): Call 5-842.772.2731 (1-800-CDC-INFO) or  Visit CDCs influenza website at www.cdc.gov/flu. Vaccine Information Statement   Inactivated Influenza Vaccine   8/6/2021  42 JONATHAN Arroyo [id number]   Department of Health and Human Services  Centers for Disease Control and Prevention    Office Use Only

## 2022-10-31 NOTE — PROGRESS NOTES
Juliane Bui  Identified pt with two pt identifiers(name and ). Chief Complaint   Patient presents with    Hypertension       Reviewed record In preparation for visit and have obtained necessary documentation. 1. Have you been to the ER, urgent care clinic or hospitalized since your last visit? No     2. Have you seen or consulted any other health care providers outside of the [address] [place] [address] since your last visit? Include any pap smears or colon screening. No    Vitals reviewed with provider. Health Maintenance reviewed: After verbal order read back of Dr Jackie Pettit, patient received High Dose Flu Shot (Adjuvanted Fluad) in left deltoid. Mery Louis 47: 65182-403-41 Lot: [id number] Exp: 2023. Patient tolerated procedure without complaints and received VIS.       Health Maintenance Due   Topic    Shingrix Vaccine Age 50> (1 of 2)    COVID-19 Vaccine (4 - Booster for Moderna series)    Flu Vaccine (1)          Wt Readings from Last 3 Encounters:   10/31/22 126 lb (57.2 kg)   10/20/22 126 lb (57.2 kg)   22 126 lb 9.6 oz (57.4 kg)        Temp Readings from Last 3 Encounters:   10/31/22 98.2 °F (36.8 °C) (Oral)   22 97.6 °F (36.4 °C) (Oral)   22 97.8 °F (36.6 °C) (Oral)        BP Readings from Last 3 Encounters:   10/31/22 (!) 208/70   10/20/22 (!) 140/84   22 (!) 190/56        Pulse Readings from Last 3 Encounters:   10/31/22 78   22 83   22 (!) 58        Vitals:    10/31/22 1436   BP: (!) 208/70   Pulse: 78   Resp: 12   Temp: 98.2 °F (36.8 °C)   TempSrc: Oral   SpO2: 97%   Weight: 126 lb (57.2 kg)   Height: 4' 8\" (1.422 m)   PainSc:   0 - No pain          Learning Assessment:   :       Learning Assessment 2019   PRIMARY LEARNER Patient   PRIMARY LANGUAGE ENGLISH   LEARNER PREFERENCE PRIMARY READING   ANSWERED BY patient   RELATIONSHIP SELF        Depression Screening:   :       3 most recent PHQ Screens 2022   Little interest or pleasure in doing things Not at all   Feeling down, depressed, irritable, or hopeless Not at all   Total Score PHQ 2 0        Fall Risk Assessment:   :       Fall Risk Assessment, last 12 mths 9/12/2022   Able to walk? Yes   Fall in past 12 months? 0   Do you feel unsteady? 0   Are you worried about falling 0        Abuse Screening:   :       Abuse Screening Questionnaire 3/29/2022 1/13/2020 10/9/2019 2/15/2018   Do you ever feel afraid of your partner? N N N N   Are you in a relationship with someone who physically or mentally threatens you? N N N N   Is it safe for you to go home?  Y Y Y Y        ADL Screening:   :       ADL Assessment 3/29/2022   Feeding yourself No Help Needed   Getting from bed to chair No Help Needed   Getting dressed No Help Needed   Bathing or showering No Help Needed   Walk across the room (includes cane/walker) No Help Needed   Using the telphone No Help Needed   Taking your medications No Help Needed   Preparing meals No Help Needed   Managing money (expenses/bills) No Help Needed   Moderately strenuous housework (laundry) No Help Needed   Shopping for personal items (toiletries/medicines) No Help Needed   Shopping for groceries Help Needed   Driving Help Needed   Climbing a flight of stairs No Help Needed   Getting to places beyond walking distances Help Needed

## 2022-10-31 NOTE — PROGRESS NOTES
HPI  Ms. Mj Ansari is a 80y.o. year old female, she is seen today for follow up HTN. Since last visit was seen by Dr. Kraig Wasserman cardiology for dyspnea on exertion, stress test and echo done. EF 60-65% with mild concentric hypertrophy, mild AR. Lexiscan stress test normal.     Took BP meds this am.   Only has dyspnea on exertion, not worse. No chest pain, no HA, chronic left ankle swelling. No vision changes. Back pain chronic - not worse - tramadol helps but doesn't like to take it. 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 Normally bp around 822 systolic when she checks at home. Chief Complaint   Patient presents with    Hypertension        Prior to Admission medications    Medication Sig Start Date End Date Taking? Authorizing Provider   nebivoloL (BYSTOLIC) 10 mg tablet TAKE 1 TABLET DAILY 9/26/22  Yes Inga Gomez MD   losartan (COZAAR) 50 mg tablet TAKE 1 TABLET TWICE A DAY 5/16/22  Yes Inga Gomez MD   celecoxib (CELEBREX) 200 mg capsule TAKE 1 CAPSULE DAILY 5/16/22  Yes Inga Gomez MD   omeprazole (PRILOSEC) 20 mg capsule TAKE 1 CAPSULE DAILY 3/28/22  Yes Inga Gomez MD   famotidine (PEPCID) 20 mg tablet TAKE 1 TABLET NIGHTLY 2/14/22  Yes Inga Gomez MD   atorvastatin (LIPITOR) 20 mg tablet TAKE 1 TABLET DAILY 2/3/22  Yes Inga Gomez MD   furosemide (LASIX) 20 mg tablet TAKE 1 TABLET DAILY 1/27/22  Yes Inga Gomez MD   amLODIPine (NORVASC) 5 mg tablet TAKE 1 TABLET DAILY 1/19/22  Yes Inga Gomez MD   chlorthalidone (HYGROTON) 25 mg tablet TAKE ONE-HALF (1/2) TABLET DAILY 1/12/22  Yes Inga Gomez MD   triamcinolone acetonide (KENALOG) 0.1 % ointment Apply  to affected area two (2) times a day. use thin layer 11/24/21  Yes Inga Gomez MD   aspirin 81 mg chewable tablet Take 81 mg by mouth daily.    Yes Provider, Historical   acetaminophen (TYLENOL) 500 mg tablet Take  by mouth every six (6) hours as needed for Pain. Yes Provider, Historical   ondansetron (ZOFRAN ODT) 4 mg disintegrating tablet Take 1 Tab by mouth every eight (8) hours as needed for Nausea or Vomiting. 5/14/20  Yes Roxanna Collins MD   cholecalciferol, vitamin D3, 50 mcg (2,000 unit) tab Take  by mouth daily. Yes Provider, Historical         No Known Allergies      REVIEW OF SYSTEMS:  Per HPI    PHYSICAL EXAM:  Visit Vitals  BP (!) 180/64   Pulse 78   Temp 98.2 °F (36.8 °C) (Oral)   Resp 12   Ht 4' 8\" (1.422 m)   Wt 126 lb (57.2 kg)   SpO2 97%   BMI 28.25 kg/m²     Constitutional: Appears well-developed and well-nourished. No distress. HENT:   Head: Normocephalic and atraumatic. Eyes: No scleral icterus. Cardiovascular: Normal S1/S2, regular rhythm. No murmurs, rubs, or gallops. Pulmonary/Chest: Effort normal and breath sounds normal. No respiratory distress. No wheezes, rhonchi, or rales. Back: +kyphosis, no pain on palpation spine  Ext: No edema. Neurological: Alert. Psychiatric: Normal mood and affect. Behavior is normal.     Lab Results   Component Value Date/Time    Sodium 142 07/13/2022 11:08 AM    Potassium 5.1 07/13/2022 11:08 AM    Chloride 104 07/13/2022 11:08 AM    CO2 26 07/13/2022 11:08 AM    Anion gap 9 03/26/2014 05:00 AM    Glucose 94 07/13/2022 11:08 AM    BUN 30 07/13/2022 11:08 AM    Creatinine 1.19 (H) 07/13/2022 11:08 AM    BUN/Creatinine ratio 25 07/13/2022 11:08 AM    GFR est AA 34 (L) 08/31/2021 10:47 AM    GFR est non-AA 30 (L) 08/31/2021 10:47 AM    Calcium 9.9 07/13/2022 11:08 AM    Bilirubin, total 0.9 07/13/2022 11:08 AM    Alk.  phosphatase 120 07/13/2022 11:08 AM    Protein, total 6.9 07/13/2022 11:08 AM    Albumin 4.4 07/13/2022 11:08 AM    Globulin 3.3 03/12/2013 03:45 AM    A-G Ratio 1.8 07/13/2022 11:08 AM    ALT (SGPT) 17 07/13/2022 11:08 AM     Lab Results   Component Value Date/Time    Hemoglobin A1c 5.1 03/07/2014 01:35 PM    Hemoglobin A1c 5.4 02/25/2013 02:09 PM      Lab Results   Component Value Date/Time    Cholesterol, total 143 07/13/2022 11:08 AM    HDL Cholesterol 68 07/13/2022 11:08 AM    LDL, calculated 56 07/13/2022 11:08 AM    LDL, calculated 53 12/20/2018 12:05 PM    VLDL, calculated 19 07/13/2022 11:08 AM    VLDL, calculated 15 12/20/2018 12:05 PM    Triglyceride 109 07/13/2022 11:08 AM          ASSESSMENT/PLAN  Diagnoses and all orders for this visit:    1. Compression fracture of T10 vertebra with routine healing, subsequent encounter    2. Needs flu shot  -     INFLUENZA, FLUAD, (AGE 65 Y+), IM, PF, 0.5 ML    3. Essential hypertension      BP better controlled at home - monitor - if above 708 systolic will increase amlodipine to 7.5mg daily  Back pain chronic, rare use tramadol helps  Health Maintenance Due   Topic Date Due    Shingrix Vaccine Age 49> (1 of 2) Never done    COVID-19 Vaccine (4 - Booster for Moderna series) 12/24/2021    Flu Vaccine (1) 08/01/2022        Follow-up and Dispositions    Return in about 4 months (around 2/28/2023) for bp. Reviewed plan of care. Patient has provided input and agrees with goals. The nurse provided the patient and/or family with advanced directive information if needed and encouraged the patient to provide a copy to the office when available.

## 2023-01-09 DIAGNOSIS — I10 ESSENTIAL HYPERTENSION: ICD-10-CM

## 2023-01-09 RX ORDER — CHLORTHALIDONE 25 MG/1
TABLET ORAL
Qty: 45 TABLET | Refills: 3 | Status: SHIPPED | OUTPATIENT
Start: 2023-01-09

## 2023-01-16 DIAGNOSIS — I10 ESSENTIAL HYPERTENSION: ICD-10-CM

## 2023-01-16 RX ORDER — AMLODIPINE BESYLATE 5 MG/1
TABLET ORAL
Qty: 90 TABLET | Refills: 3 | Status: SHIPPED | OUTPATIENT
Start: 2023-01-16

## 2023-01-23 DIAGNOSIS — I10 ESSENTIAL HYPERTENSION: ICD-10-CM

## 2023-01-23 RX ORDER — FUROSEMIDE 20 MG/1
TABLET ORAL
Qty: 90 TABLET | Refills: 3 | Status: SHIPPED | OUTPATIENT
Start: 2023-01-23

## 2023-02-09 DIAGNOSIS — K21.9 GASTROESOPHAGEAL REFLUX DISEASE WITHOUT ESOPHAGITIS: ICD-10-CM

## 2023-02-09 RX ORDER — FAMOTIDINE 20 MG/1
TABLET, FILM COATED ORAL
Qty: 90 TABLET | Refills: 3 | Status: SHIPPED | OUTPATIENT
Start: 2023-02-09

## 2023-02-28 ENCOUNTER — OFFICE VISIT (OUTPATIENT)
Dept: INTERNAL MEDICINE CLINIC | Age: 88
End: 2023-02-28
Payer: MEDICARE

## 2023-02-28 VITALS
HEART RATE: 68 BPM | TEMPERATURE: 98.4 F | OXYGEN SATURATION: 97 % | RESPIRATION RATE: 12 BRPM | BODY MASS INDEX: 27.9 KG/M2 | WEIGHT: 124 LBS | HEIGHT: 56 IN | SYSTOLIC BLOOD PRESSURE: 160 MMHG | DIASTOLIC BLOOD PRESSURE: 80 MMHG

## 2023-02-28 DIAGNOSIS — M48.061 SPINAL STENOSIS OF LUMBAR REGION WITHOUT NEUROGENIC CLAUDICATION: ICD-10-CM

## 2023-02-28 DIAGNOSIS — M81.0 AGE-RELATED OSTEOPOROSIS WITHOUT CURRENT PATHOLOGICAL FRACTURE: ICD-10-CM

## 2023-02-28 DIAGNOSIS — I10 ESSENTIAL HYPERTENSION: Primary | ICD-10-CM

## 2023-02-28 PROCEDURE — 99214 OFFICE O/P EST MOD 30 MIN: CPT | Performed by: INTERNAL MEDICINE

## 2023-02-28 PROCEDURE — 1090F PRES/ABSN URINE INCON ASSESS: CPT | Performed by: INTERNAL MEDICINE

## 2023-02-28 PROCEDURE — 1123F ACP DISCUSS/DSCN MKR DOCD: CPT | Performed by: INTERNAL MEDICINE

## 2023-02-28 PROCEDURE — G8427 DOCREV CUR MEDS BY ELIG CLIN: HCPCS | Performed by: INTERNAL MEDICINE

## 2023-02-28 PROCEDURE — G8536 NO DOC ELDER MAL SCRN: HCPCS | Performed by: INTERNAL MEDICINE

## 2023-02-28 PROCEDURE — 1101F PT FALLS ASSESS-DOCD LE1/YR: CPT | Performed by: INTERNAL MEDICINE

## 2023-02-28 PROCEDURE — G8432 DEP SCR NOT DOC, RNG: HCPCS | Performed by: INTERNAL MEDICINE

## 2023-02-28 PROCEDURE — G8417 CALC BMI ABV UP PARAM F/U: HCPCS | Performed by: INTERNAL MEDICINE

## 2023-02-28 NOTE — PROGRESS NOTES
HPI  Ms. Junior Rodriguez is a 80y.o. year old female, she is seen today for follow up HTN. Feels \"pretty good\". Back pain is chronic, rarely takes tramadol but it does help. NELLA sanabria - has seen cards for this and stress test and echo done. EF 60-65% with mild concentric hypertrophy, mild AR. Lexiscan stress test normal.     No n/v/abd pain. No chest pain. No change in chronic edema left ankle since surgery for back 10 years ago. BP is normally 735L systolic. Chief Complaint   Patient presents with    Hypertension    Osteoporosis        Prior to Admission medications    Medication Sig Start Date End Date Taking? Authorizing Provider   famotidine (PEPCID) 20 mg tablet TAKE 1 TABLET NIGHTLY 2/9/23  Yes Natacha Hutchison MD   atorvastatin (LIPITOR) 20 mg tablet TAKE 1 TABLET DAILY 1/30/23  Yes EMRE Nolasco-MEETA   furosemide (LASIX) 20 mg tablet TAKE 1 TABLET DAILY 1/23/23  Yes Natacha Hutchison MD   amLODIPine (NORVASC) 5 mg tablet TAKE 1 TABLET DAILY 1/16/23  Yes Natacha Hutchison MD   chlorthalidone (HYGROTON) 25 mg tablet TAKE ONE-HALF (1/2) TABLET DAILY 1/9/23  Yes Natacha Hutchison MD   nebivoloL (BYSTOLIC) 10 mg tablet TAKE 1 TABLET DAILY 9/26/22  Yes Natacha Hutchison MD   losartan (COZAAR) 50 mg tablet TAKE 1 TABLET TWICE A DAY 5/16/22  Yes Natacha Hutchison MD   celecoxib (CELEBREX) 200 mg capsule TAKE 1 CAPSULE DAILY 5/16/22  Yes Natacha Hutchison MD   omeprazole (PRILOSEC) 20 mg capsule TAKE 1 CAPSULE DAILY 3/28/22  Yes Natacha Hutchison MD   triamcinolone acetonide (KENALOG) 0.1 % ointment Apply  to affected area two (2) times a day. use thin layer 11/24/21  Yes Natacha Hutchison MD   aspirin 81 mg chewable tablet Take 81 mg by mouth daily. Yes Provider, Historical   acetaminophen (TYLENOL) 500 mg tablet Take  by mouth every six (6) hours as needed for Pain.    Yes Provider, Historical   ondansetron (ZOFRAN ODT) 4 mg disintegrating tablet Take 1 Tab by mouth every eight (8) hours as needed for Nausea or Vomiting. 5/14/20  Yes Muna Munoz MD   cholecalciferol, vitamin D3, 50 mcg (2,000 unit) tab Take  by mouth daily. Yes Provider, Historical         No Known Allergies      REVIEW OF SYSTEMS:  Per HPI    PHYSICAL EXAM:  Visit Vitals  BP (!) 160/80   Pulse 68   Temp 98.4 °F (36.9 °C) (Oral)   Resp 12   Ht 4' 8\" (1.422 m)   Wt 124 lb (56.2 kg)   SpO2 97%   BMI 27.80 kg/m²     Constitutional: Appears well-developed and well-nourished. No distress. HENT:   Head: Normocephalic and atraumatic. Eyes: No scleral icterus. Cardiovascular: Normal S1/S2, regular rhythm. No murmurs, rubs, or gallops. Pulmonary/Chest: Effort normal and breath sounds normal. No respiratory distress. No wheezes, rhonchi, or rales. Ext: trace edema left ankle. Neurological: Alert. Psychiatric: Normal mood and affect. Behavior is normal.     Lab Results   Component Value Date/Time    Sodium 142 07/13/2022 11:08 AM    Potassium 5.1 07/13/2022 11:08 AM    Chloride 104 07/13/2022 11:08 AM    CO2 26 07/13/2022 11:08 AM    Anion gap 9 03/26/2014 05:00 AM    Glucose 94 07/13/2022 11:08 AM    BUN 30 07/13/2022 11:08 AM    Creatinine 1.19 (H) 07/13/2022 11:08 AM    BUN/Creatinine ratio 25 07/13/2022 11:08 AM    GFR est AA 34 (L) 08/31/2021 10:47 AM    GFR est non-AA 30 (L) 08/31/2021 10:47 AM    Calcium 9.9 07/13/2022 11:08 AM    Bilirubin, total 0.9 07/13/2022 11:08 AM    Alk.  phosphatase 120 07/13/2022 11:08 AM    Protein, total 6.9 07/13/2022 11:08 AM    Albumin 4.4 07/13/2022 11:08 AM    Globulin 3.3 03/12/2013 03:45 AM    A-G Ratio 1.8 07/13/2022 11:08 AM    ALT (SGPT) 17 07/13/2022 11:08 AM     Lab Results   Component Value Date/Time    Hemoglobin A1c 5.1 03/07/2014 01:35 PM    Hemoglobin A1c 5.4 02/25/2013 02:09 PM      Lab Results   Component Value Date/Time    Cholesterol, total 143 07/13/2022 11:08 AM    HDL Cholesterol 68 07/13/2022 11:08 AM    LDL, calculated 56 07/13/2022 11:08 AM    LDL, calculated 53 12/20/2018 12:05 PM    VLDL, calculated 19 07/13/2022 11:08 AM    VLDL, calculated 15 12/20/2018 12:05 PM    Triglyceride 109 07/13/2022 11:08 AM          ASSESSMENT/PLAN  Diagnoses and all orders for this visit:    1. Essential hypertension    2. Spinal stenosis of lumbar region without neurogenic claudication    3. Age-related osteoporosis without current pathological fracture    Declines repeat BMD for now  BP improved on recheck - if higher than 776 systolic consistently at home increase amlodipine to 7.5mg daily  Chronic back pain - encouraged her to use tramadol as needed - has been reluctant but pain can be severe    There are no preventive care reminders to display for this patient. Follow-up and Dispositions    Return in about 4 months (around 6/28/2023) for bp, AWV, 30 MIN APPT. Reviewed plan of care. Patient has provided input and agrees with goals. The nurse provided the patient and/or family with advanced directive information if needed and encouraged the patient to provide a copy to the office when available.

## 2023-02-28 NOTE — PROGRESS NOTES
Juliane Bui  Identified pt with two pt identifiers(name and ). Chief Complaint   Patient presents with    Hypertension    Osteoporosis       Reviewed record In preparation for visit and have obtained necessary documentation. 1. Have you been to the ER, urgent care clinic or hospitalized since your last visit? No     2. Have you seen or consulted any other health care providers outside of the [address] [place] [address] since your last visit? Include any pap smears or colon screening. No    Vitals reviewed with provider. Health Maintenance reviewed: There are no preventive care reminders to display for this patient. Wt Readings from Last 3 Encounters:   23 124 lb (56.2 kg)   10/31/22 126 lb (57.2 kg)   10/20/22 126 lb (57.2 kg)        Temp Readings from Last 3 Encounters:   23 98.4 °F (36.9 °C) (Oral)   10/31/22 98.2 °F (36.8 °C) (Oral)   22 97.6 °F (36.4 °C) (Oral)        BP Readings from Last 3 Encounters:   23 (!) 190/67   10/31/22 (!) 180/64   10/20/22 (!) 140/84        Pulse Readings from Last 3 Encounters:   23 68   10/31/22 78   22 83        Vitals:    23 1513   BP: (!) 190/67   Pulse: 68   Resp: 12   Temp: 98.4 °F (36.9 °C)   TempSrc: Oral   SpO2: 97%   Weight: 124 lb (56.2 kg)   Height: 4' 8\" (1.422 m)   PainSc:   4   PainLoc: Back          Learning Assessment:   :       Learning Assessment 2019   PRIMARY LEARNER Patient   PRIMARY LANGUAGE ENGLISH   LEARNER PREFERENCE PRIMARY READING   ANSWERED BY patient   RELATIONSHIP SELF        Depression Screening:   :       3 most recent PHQ Screens 2022   Little interest or pleasure in doing things Not at all   Feeling down, depressed, irritable, or hopeless Not at all   Total Score PHQ 2 0        Fall Risk Assessment:   :       Fall Risk Assessment, last 12 mths 2022   Able to walk? Yes   Fall in past 12 months? 0   Do you feel unsteady?  0   Are you worried about falling 0        Abuse Screening:   :       Abuse Screening Questionnaire 3/29/2022 1/13/2020 10/9/2019 2/15/2018   Do you ever feel afraid of your partner? N N N N   Are you in a relationship with someone who physically or mentally threatens you? N N N N   Is it safe for you to go home?  Y Y Y Y        ADL Screening:   :       ADL Assessment 3/29/2022   Feeding yourself No Help Needed   Getting from bed to chair No Help Needed   Getting dressed No Help Needed   Bathing or showering No Help Needed   Walk across the room (includes cane/walker) No Help Needed   Using the telphone No Help Needed   Taking your medications No Help Needed   Preparing meals No Help Needed   Managing money (expenses/bills) No Help Needed   Moderately strenuous housework (laundry) No Help Needed   Shopping for personal items (toiletries/medicines) No Help Needed   Shopping for groceries Help Needed   Driving Help Needed   Climbing a flight of stairs No Help Needed   Getting to places beyond walking distances Help Needed

## 2023-02-28 NOTE — PATIENT INSTRUCTIONS
If your blood pressure is consistently higher than 658 systolic then increase amlodipine to 7.5mg daily (5mg pill take 1 1/2 tablets).

## 2023-03-23 DIAGNOSIS — K21.9 GASTROESOPHAGEAL REFLUX DISEASE WITHOUT ESOPHAGITIS: ICD-10-CM

## 2023-03-23 RX ORDER — OMEPRAZOLE 20 MG/1
CAPSULE, DELAYED RELEASE ORAL
Qty: 90 CAPSULE | Refills: 3 | Status: SHIPPED | OUTPATIENT
Start: 2023-03-23

## 2023-05-01 DIAGNOSIS — G45.9 TRANSIENT CEREBRAL ISCHEMIA, UNSPECIFIED TYPE: ICD-10-CM

## 2023-05-01 RX ORDER — ATORVASTATIN CALCIUM 20 MG/1
TABLET, FILM COATED ORAL
Qty: 90 TABLET | Refills: 3 | Status: SHIPPED | OUTPATIENT
Start: 2023-05-01

## 2023-05-10 RX ORDER — CELECOXIB 200 MG/1
CAPSULE ORAL
Qty: 90 CAPSULE | Refills: 3 | Status: SHIPPED | OUTPATIENT
Start: 2023-05-10

## 2023-05-10 RX ORDER — LOSARTAN POTASSIUM 50 MG/1
TABLET ORAL
Qty: 180 TABLET | Refills: 3 | Status: SHIPPED | OUTPATIENT
Start: 2023-05-10

## 2023-05-10 NOTE — TELEPHONE ENCOUNTER
PCP: Armani Villareal MD     Last appt: 2/28/2023      Future Appointments   Date Time Provider Kristel Tran   7/10/2023  2:30 PM Noy Benavides MD [facility] AMB          Requested Prescriptions     Pending Prescriptions Disp Refills    celecoxib (CELEBREX) 200 MG capsule [Pharmacy Med Name: CELECOXIB CAPS 200MG] 90 capsule 3     Sig: TAKE 1 CAPSULE DAILY    losartan (COZAAR) 50 MG tablet [Pharmacy Med Name: LOSARTAN TABS 50MG] 180 tablet 3     Sig: TAKE 1 TABLET TWICE A DAY

## 2023-05-11 NOTE — PROGRESS NOTES
-- DO NOT REPLY / DO NOT REPLY ALL --  -- Message is from Engagement Center Operations (ECO) --    PHARMACY just called about the Trulicity RX.  Can Dr. Soto send a new RX for the 1.5 MG once they have completed the 0.75 MG?  Since they are tapering up, insurance would need new RX to cover it.  Please call back with any questions.  Thank you.  [phone].      ONLY TO BE USED WITHIN A REFILL MEDICATION ENCOUNTER    Med Refill  Is the patient currently having any symptoms?: No/Non-Emergent symptoms     Full name of the provider who ordered the medication: Dr. Aliza Soto    Clinic site name / Account # for provider:  Anatoly Villalobos  Preferred Pharmacy: Pharmacy  [id number] - [place], [place] - [address]    Patient confirmed the above pharmacy as correct?  Yes      Caller Information       Type Contact Phone/Fax    05/11/2023 12:21 PM CDT Phone (Incoming) [id number] - [place], [place] - [address] (Pharmacy) [phone]          Alternative phone number:  no    Can a detailed message be left?: No    Patient is completely out of medication: Verify if patient is currently experiencing symptoms. If patient is symptomatic, proceed with front end triage instead of medication refill. If patient is not symptomatic but is completely out of medication, catarino as High priority when routing. Inform patient: “Please call back with any questions or concerns and if your condition becomes life threatening, you should seek immediate medical assistance by calling 911 or going to the Emergency Department for evaluation.”    Inform all patients: \"If the clinical team needs to contact you regarding this refill, please be aware the return phone call may come from an unidentified or out of state phone number and your refill request will be addressed as soon as the clinical team reviews your message.\"   Dunia Zambrano    Chief Complaint   Patient presents with    Blood Pressure Check     Room 2A// NON fasting     Annual Wellness Visit           Reviewed record In preparation for visit and have obtained necessary documentation    1. Have you been to the ER, urgent care clinic since your last visit? Hospitalized since your last visit? NO  2. Have you seen or consulted any other health care providers outside of the [address] [place] [address] since your last visit? Include any pap smears or colon screening. NO    Patient has advance directive on file    Provider advised of reason for visit and vitals    Health Maintenance Due   Topic    ZOSTER VACCINE AGE 60>     GLAUCOMA SCREENING Q2Y     Pneumococcal 65+ Low/Medium Risk (1 of 2 - PCV13)    MEDICARE YEARLY EXAM        Wt Readings from Last 3 Encounters:   02/15/18 126 lb (57.2 kg)   01/16/18 125 lb 3.2 oz (56.8 kg)   12/07/17 125 lb (56.7 kg)     Temp Readings from Last 3 Encounters:   02/15/18 97.8 °F (36.6 °C) (Oral)   01/16/18 97.6 °F (36.4 °C) (Oral)   12/07/17 97.8 °F (36.6 °C) (Oral)     BP Readings from Last 3 Encounters:   02/15/18 150/80   01/16/18 188/80   12/07/17 190/78     Pulse Readings from Last 3 Encounters:   02/15/18 (!) 56   01/16/18 67   12/07/17 67         Learning Assessment:  :     No flowsheet data found. Depression Screening:  :     PHQ over the last two weeks 2/15/2018   Little interest or pleasure in doing things Not at all   Feeling down, depressed or hopeless Not at all   Total Score PHQ 2 0       Fall Risk Assessment:  :     Fall Risk Assessment, last 12 mths 2/15/2018   Able to walk? Yes   Fall in past 12 months? No       Abuse Screening:  :     Abuse Screening Questionnaire 2/15/2018   Do you ever feel afraid of your partner? N   Are you in a relationship with someone who physically or mentally threatens you? N   Is it safe for you to go home?  Y       ADL Screening:  :     ADL Assessment 2/15/2018   Feeding yourself No Help Needed   Getting from bed to chair No Help Needed   Getting dressed No Help Needed   Bathing or showering No Help Needed   Walk across the room (includes cane/walker) No Help Needed   Using the telphone No Help Needed   Taking your medications No Help Needed   Preparing meals No Help Needed   Managing money (expenses/bills) No Help Needed   Moderately strenuous housework (laundry) No Help Needed   Shopping for personal items (toiletries/medicines) No Help Needed   Shopping for groceries Help Needed   Driving Help Needed   Climbing a flight of stairs No Help Needed   Getting to places beyond walking distances Help Needed           Medication reconciliation up to date and corrected with patient at this time.

## 2023-05-31 ENCOUNTER — TELEPHONE (OUTPATIENT)
Age: 88
End: 2023-05-31

## 2023-07-10 ENCOUNTER — OFFICE VISIT (OUTPATIENT)
Facility: CLINIC | Age: 88
End: 2023-07-10
Payer: MEDICARE

## 2023-07-10 VITALS
TEMPERATURE: 97.4 F | HEIGHT: 56 IN | WEIGHT: 122 LBS | OXYGEN SATURATION: 97 % | HEART RATE: 64 BPM | BODY MASS INDEX: 27.44 KG/M2 | SYSTOLIC BLOOD PRESSURE: 144 MMHG | RESPIRATION RATE: 12 BRPM | DIASTOLIC BLOOD PRESSURE: 74 MMHG

## 2023-07-10 DIAGNOSIS — I10 ESSENTIAL HYPERTENSION: ICD-10-CM

## 2023-07-10 DIAGNOSIS — R19.7 DIARRHEA, UNSPECIFIED TYPE: ICD-10-CM

## 2023-07-10 DIAGNOSIS — N18.32 STAGE 3B CHRONIC KIDNEY DISEASE (HCC): ICD-10-CM

## 2023-07-10 DIAGNOSIS — M48.061 SPINAL STENOSIS OF LUMBAR REGION WITHOUT NEUROGENIC CLAUDICATION: ICD-10-CM

## 2023-07-10 DIAGNOSIS — E78.2 MIXED HYPERLIPIDEMIA: ICD-10-CM

## 2023-07-10 DIAGNOSIS — Z00.00 MEDICARE ANNUAL WELLNESS VISIT, SUBSEQUENT: Primary | ICD-10-CM

## 2023-07-10 PROBLEM — N18.30 CHRONIC RENAL DISEASE, STAGE III (HCC): Status: ACTIVE | Noted: 2023-07-10

## 2023-07-10 PROCEDURE — 1123F ACP DISCUSS/DSCN MKR DOCD: CPT | Performed by: INTERNAL MEDICINE

## 2023-07-10 PROCEDURE — G8419 CALC BMI OUT NRM PARAM NOF/U: HCPCS | Performed by: INTERNAL MEDICINE

## 2023-07-10 PROCEDURE — 99214 OFFICE O/P EST MOD 30 MIN: CPT | Performed by: INTERNAL MEDICINE

## 2023-07-10 PROCEDURE — G0439 PPPS, SUBSEQ VISIT: HCPCS | Performed by: INTERNAL MEDICINE

## 2023-07-10 PROCEDURE — 1090F PRES/ABSN URINE INCON ASSESS: CPT | Performed by: INTERNAL MEDICINE

## 2023-07-10 PROCEDURE — 1036F TOBACCO NON-USER: CPT | Performed by: INTERNAL MEDICINE

## 2023-07-10 PROCEDURE — G8427 DOCREV CUR MEDS BY ELIG CLIN: HCPCS | Performed by: INTERNAL MEDICINE

## 2023-07-10 SDOH — ECONOMIC STABILITY: HOUSING INSECURITY
IN THE LAST 12 MONTHS, WAS THERE A TIME WHEN YOU DID NOT HAVE A STEADY PLACE TO SLEEP OR SLEPT IN A SHELTER (INCLUDING NOW)?: NO

## 2023-07-10 SDOH — ECONOMIC STABILITY: FOOD INSECURITY: WITHIN THE PAST 12 MONTHS, THE FOOD YOU BOUGHT JUST DIDN'T LAST AND YOU DIDN'T HAVE MONEY TO GET MORE.: NEVER TRUE

## 2023-07-10 SDOH — ECONOMIC STABILITY: FOOD INSECURITY: WITHIN THE PAST 12 MONTHS, YOU WORRIED THAT YOUR FOOD WOULD RUN OUT BEFORE YOU GOT MONEY TO BUY MORE.: NEVER TRUE

## 2023-07-10 SDOH — ECONOMIC STABILITY: INCOME INSECURITY: HOW HARD IS IT FOR YOU TO PAY FOR THE VERY BASICS LIKE FOOD, HOUSING, MEDICAL CARE, AND HEATING?: NOT HARD AT ALL

## 2023-07-10 ASSESSMENT — PATIENT HEALTH QUESTIONNAIRE - PHQ9
SUM OF ALL RESPONSES TO PHQ QUESTIONS 1-9: 0
SUM OF ALL RESPONSES TO PHQ QUESTIONS 1-9: 0
SUM OF ALL RESPONSES TO PHQ9 QUESTIONS 1 & 2: 0
SUM OF ALL RESPONSES TO PHQ QUESTIONS 1-9: 0
1. LITTLE INTEREST OR PLEASURE IN DOING THINGS: 0
SUM OF ALL RESPONSES TO PHQ QUESTIONS 1-9: 0
2. FEELING DOWN, DEPRESSED OR HOPELESS: 0

## 2023-07-10 ASSESSMENT — LIFESTYLE VARIABLES
HOW OFTEN DO YOU HAVE A DRINK CONTAINING ALCOHOL: NEVER
HOW MANY STANDARD DRINKS CONTAINING ALCOHOL DO YOU HAVE ON A TYPICAL DAY: PATIENT DOES NOT DRINK

## 2023-07-10 NOTE — PROGRESS NOTES
Medicare Annual Wellness Visit    Lucas Llanes is here for Medicare AWV, Hypertension, and Diarrhea    Assessment & Plan   Medicare annual wellness visit, subsequent  Essential hypertension  -     Comprehensive Metabolic Panel; Future  -     CBC with Auto Differential; Future  Stage 3b chronic kidney disease ([address])  Spinal stenosis of lumbar region without neurogenic claudication  Mixed hyperlipidemia  -     Lipid Panel; Future  Diarrhea, unspecified type    Unclear etiology to diarrhea - ? IBS vs viral infections - now resolved - rec immodium per package directions if needed, consider referral to GI if not improved  CKD has been stable - needs new labs  Back pain chronic and stable - rarely uses tramadol  BP acceptable for age- continue current medications     Recommendations for Preventive Services Due: see orders and patient instructions/AVS.  Recommended screening schedule for the next 5-10 years is provided to the patient in written form: see Patient Instructions/AVS.     Return in about 6 months (around 1/10/2024), or if symptoms worsen or fail to improve, for HTN, OFFICE VISIT EXTENDED. Subjective   The following acute and/or chronic problems were also addressed today:  HTN, diarrhea    No chest pain, change in chronic YATES - not checking BP at home  No n/v/abd pain    Had a spell of loose stools 3-5 x per day couple of weeks ago for one week - was able to eat and drink normally - immodium helped some but only took one at a time  Has had spells of diarrhea as long as she can remember    Chronic back pain - has tramadol to take prn but has only taken 3 per last visit - will normally take after long car ride - helps pain significantly    Patient's complete Health Risk Assessment and screening values have been reviewed and are found in Flowsheets. The following problems were reviewed today and where indicated follow up appointments were made and/or referrals ordered.     Positive Risk Factor Screenings with

## 2023-07-10 NOTE — PATIENT INSTRUCTIONS
Incorporated. Care instructions adapted under license by [facility] ([facility]). If you have questions about a medical condition or this instruction, always ask your healthcare professional. [address] any warranty or liability for your use of this information. Personalized Preventive Plan for Marianne Graciela - 7/10/2023  Medicare offers a range of preventive health benefits. Some of the tests and screenings are paid in full while other may be subject to a deductible, co-insurance, and/or copay. Some of these benefits include a comprehensive review of your medical history including lifestyle, illnesses that may run in your family, and various assessments and screenings as appropriate. After reviewing your medical record and screening and assessments performed today your provider may have ordered immunizations, labs, imaging, and/or referrals for you. A list of these orders (if applicable) as well as your Preventive Care list are included within your After Visit Summary for your review. Other Preventive Recommendations:    A preventive eye exam performed by an eye specialist is recommended every 1-2 years to screen for glaucoma; cataracts, macular degeneration, and other eye disorders. A preventive dental visit is recommended every 6 months. Try to get at least 150 minutes of exercise per week or 10,000 steps per day on a pedometer . Order or download the FREE \"Exercise & Physical Activity: Your Everyday Guide\" from [employer]. Call 3-706.498.2710 or search [employer] online. You need 0155-6779 mg of calcium and 1114-5478 IU of vitamin D per day. It is possible to meet your calcium requirement with diet alone, but a vitamin D supplement is usually necessary to meet this goal.  When exposed to the sun, use a sunscreen that protects against both UVA and UVB radiation with an SPF of 30 or greater.  Reapply every 2 to 3 hours or after sweating,

## 2023-07-26 LAB
ALBUMIN SERPL-MCNC: 4.3 G/DL (ref 3.6–4.6)
ALBUMIN/GLOB SERPL: 1.7 {RATIO} (ref 1.2–2.2)
ALP SERPL-CCNC: 105 IU/L (ref 44–121)
ALT SERPL-CCNC: 12 IU/L (ref 0–32)
AST SERPL-CCNC: 18 IU/L (ref 0–40)
BASOPHILS # BLD AUTO: 0 X10E3/UL (ref 0–0.2)
BASOPHILS NFR BLD AUTO: 1 %
BILIRUB SERPL-MCNC: 0.9 MG/DL (ref 0–1.2)
BUN SERPL-MCNC: 31 MG/DL (ref 10–36)
BUN/CREAT SERPL: 23 (ref 12–28)
CALCIUM SERPL-MCNC: 9.2 MG/DL (ref 8.7–10.3)
CHLORIDE SERPL-SCNC: 103 MMOL/L (ref 96–106)
CHOLEST SERPL-MCNC: 163 MG/DL (ref 100–199)
CO2 SERPL-SCNC: 24 MMOL/L (ref 20–29)
CREAT SERPL-MCNC: 1.37 MG/DL (ref 0.57–1)
EGFRCR SERPLBLD CKD-EPI 2021: 36 ML/MIN/1.73
EOSINOPHIL # BLD AUTO: 0.2 X10E3/UL (ref 0–0.4)
EOSINOPHIL NFR BLD AUTO: 4 %
ERYTHROCYTE [DISTWIDTH] IN BLOOD BY AUTOMATED COUNT: 11.7 % (ref 11.7–15.4)
GLOBULIN SER CALC-MCNC: 2.5 G/DL (ref 1.5–4.5)
GLUCOSE SERPL-MCNC: 85 MG/DL (ref 70–99)
HCT VFR BLD AUTO: 35 % (ref 34–46.6)
HDLC SERPL-MCNC: 71 MG/DL
HGB BLD-MCNC: 11.8 G/DL (ref 11.1–15.9)
IMM GRANULOCYTES # BLD AUTO: 0 X10E3/UL (ref 0–0.1)
IMM GRANULOCYTES NFR BLD AUTO: 0 %
LDLC SERPL CALC-MCNC: 74 MG/DL (ref 0–99)
LYMPHOCYTES # BLD AUTO: 1.3 X10E3/UL (ref 0.7–3.1)
LYMPHOCYTES NFR BLD AUTO: 30 %
MCH RBC QN AUTO: 31.1 PG (ref 26.6–33)
MCHC RBC AUTO-ENTMCNC: 33.7 G/DL (ref 31.5–35.7)
MCV RBC AUTO: 92 FL (ref 79–97)
MONOCYTES # BLD AUTO: 0.4 X10E3/UL (ref 0.1–0.9)
MONOCYTES NFR BLD AUTO: 10 %
NEUTROPHILS # BLD AUTO: 2.4 X10E3/UL (ref 1.4–7)
NEUTROPHILS NFR BLD AUTO: 55 %
PLATELET # BLD AUTO: 194 X10E3/UL (ref 150–450)
POTASSIUM SERPL-SCNC: 4.4 MMOL/L (ref 3.5–5.2)
PROT SERPL-MCNC: 6.8 G/DL (ref 6–8.5)
RBC # BLD AUTO: 3.79 X10E6/UL (ref 3.77–5.28)
SODIUM SERPL-SCNC: 141 MMOL/L (ref 134–144)
TRIGL SERPL-MCNC: 98 MG/DL (ref 0–149)
VLDLC SERPL CALC-MCNC: 18 MG/DL (ref 5–40)
WBC # BLD AUTO: 4.4 X10E3/UL (ref 3.4–10.8)

## 2023-09-21 RX ORDER — NEBIVOLOL 10 MG/1
TABLET ORAL
Qty: 90 TABLET | Refills: 3 | Status: SHIPPED | OUTPATIENT
Start: 2023-09-21

## 2023-09-21 NOTE — TELEPHONE ENCOUNTER
PCP: Ely Buckner MD     Last appt:  7/10/2023      Future Appointments   Date Time Provider [address]   1/10/2024  2:30 PM Ely Buckner MD [facility] AMB          Requested Prescriptions     Pending Prescriptions Disp Refills    nebivolol (BYSTOLIC) 10 MG tablet [Pharmacy Med Name: NEBIVOLOL TABS 10MG] 90 tablet 3     Sig: TAKE 1 TABLET DAILY

## 2023-10-12 NOTE — PROGRESS NOTES
MRI called from intake area, inquiring (as pt states) her provider called the MRI manager to facilitate pt's testing. MRI tech was unaware of her testing and will reach out to her manager.     Osteopenia (thinning of bones) and fusion

## 2024-01-01 ENCOUNTER — HOSPICE ADMISSION (OUTPATIENT)
Age: 89
End: 2024-01-01
Payer: MEDICARE

## 2024-01-01 PROCEDURE — 0656 HSPC GENERAL INPATIENT

## 2024-01-02 RX ORDER — CHLORTHALIDONE 25 MG/1
TABLET ORAL
Qty: 45 TABLET | Refills: 3 | Status: SHIPPED | OUTPATIENT
Start: 2024-01-02

## 2024-01-09 RX ORDER — AMLODIPINE BESYLATE 5 MG/1
TABLET ORAL
Qty: 90 TABLET | Refills: 3 | Status: SHIPPED | OUTPATIENT
Start: 2024-01-09

## 2024-01-09 NOTE — TELEPHONE ENCOUNTER
PCP: Rosalind Nagy MD     Last appt:  7/10/2023      Future Appointments   Date Time Provider Department Center   1/10/2024  2:30 PM Rosalind Nagy MD [facility] AMB          Requested Prescriptions     Pending Prescriptions Disp Refills    amLODIPine (NORVASC) 5 MG tablet [Pharmacy Med Name: AMLODIPINE BESYLATE TABS 5MG] 90 tablet 3     Sig: TAKE 1 TABLET DAILY

## 2024-01-10 ENCOUNTER — OFFICE VISIT (OUTPATIENT)
Facility: CLINIC | Age: 89
End: 2024-01-10
Payer: MEDICARE

## 2024-01-10 VITALS
RESPIRATION RATE: 12 BRPM | HEIGHT: 56 IN | HEART RATE: 75 BPM | OXYGEN SATURATION: 96 % | TEMPERATURE: 98.2 F | BODY MASS INDEX: 27.56 KG/M2 | WEIGHT: 122.5 LBS | SYSTOLIC BLOOD PRESSURE: 150 MMHG | DIASTOLIC BLOOD PRESSURE: 80 MMHG

## 2024-01-10 DIAGNOSIS — I10 ESSENTIAL HYPERTENSION: Primary | ICD-10-CM

## 2024-01-10 DIAGNOSIS — N18.32 STAGE 3B CHRONIC KIDNEY DISEASE (HCC): ICD-10-CM

## 2024-01-10 DIAGNOSIS — R29.6 FREQUENT FALLS: ICD-10-CM

## 2024-01-10 DIAGNOSIS — Z23 NEEDS FLU SHOT: ICD-10-CM

## 2024-01-10 PROCEDURE — 99214 OFFICE O/P EST MOD 30 MIN: CPT | Performed by: INTERNAL MEDICINE

## 2024-01-10 PROCEDURE — G8419 CALC BMI OUT NRM PARAM NOF/U: HCPCS | Performed by: INTERNAL MEDICINE

## 2024-01-10 PROCEDURE — G8484 FLU IMMUNIZE NO ADMIN: HCPCS | Performed by: INTERNAL MEDICINE

## 2024-01-10 PROCEDURE — 1036F TOBACCO NON-USER: CPT | Performed by: INTERNAL MEDICINE

## 2024-01-10 PROCEDURE — G0008 ADMIN INFLUENZA VIRUS VAC: HCPCS | Performed by: INTERNAL MEDICINE

## 2024-01-10 PROCEDURE — G8427 DOCREV CUR MEDS BY ELIG CLIN: HCPCS | Performed by: INTERNAL MEDICINE

## 2024-01-10 PROCEDURE — 90694 VACC AIIV4 NO PRSRV 0.5ML IM: CPT | Performed by: INTERNAL MEDICINE

## 2024-01-10 PROCEDURE — 1090F PRES/ABSN URINE INCON ASSESS: CPT | Performed by: INTERNAL MEDICINE

## 2024-01-10 PROCEDURE — 1123F ACP DISCUSS/DSCN MKR DOCD: CPT | Performed by: INTERNAL MEDICINE

## 2024-01-10 ASSESSMENT — PATIENT HEALTH QUESTIONNAIRE - PHQ9
SUM OF ALL RESPONSES TO PHQ QUESTIONS 1-9: 0
SUM OF ALL RESPONSES TO PHQ QUESTIONS 1-9: 0
2. FEELING DOWN, DEPRESSED OR HOPELESS: 0
1. LITTLE INTEREST OR PLEASURE IN DOING THINGS: 0
SUM OF ALL RESPONSES TO PHQ QUESTIONS 1-9: 0
SUM OF ALL RESPONSES TO PHQ QUESTIONS 1-9: 0
SUM OF ALL RESPONSES TO PHQ9 QUESTIONS 1 & 2: 0

## 2024-01-10 NOTE — PATIENT INSTRUCTIONS
to learn more about \"Influenza (Flu) Vaccine: Care Instructions.\"  Current as of: June 13, 2023               Content Version: 13.9  © 4934-0913 [employer].   Care instructions adapted under license by [employer]. If you have questions about a medical condition or this instruction, always ask your healthcare professional. [employer] disclaims any warranty or liability for your use of this information.

## 2024-01-10 NOTE — PROGRESS NOTES
11/24/2021     1:00 PM 8/23/2021     2:09 PM   Amb Fall Risk Assessment and TUG Test   Do you feel unsteady or are you worried about falling?  yes       2 or more falls in past year? no       Fall with injury in past year? yes       Fall in past 12 months?  0 0 0 0   Able to walk?  Yes Yes Yes Yes          Abuse Screening:   :         7/10/2023     2:00 PM   AMB Abuse Screening   Do you ever feel afraid of your partner? N   Are you in a relationship with someone who physically or mentally threatens you? N   Is it safe for you to go home? Y          ADL Screening:   :         7/10/2023     2:00 PM   ADL ASSESSMENT   Feeding yourself No Help Needed   Getting from bed to chair No Help Needed   Getting dressed No Help Needed   Bathing or showering No Help Needed   Walk across the room (includes cane/walker) No Help Needed   Using the telphone No Help Needed   Taking your medications No Help Needed   Preparing meals No Help Needed   Managing money (expenses/bills) No Help Needed   Moderately strenuous housework (laundry) No Help Needed   Shopping for personal items (toiletries/medicines) No Help Needed   Shopping for groceries No Help Needed   Driving Help Needed   Climbing a flight of stairs No Help Needed   Getting to places beyond walking distances Help Needed

## 2024-01-10 NOTE — PROGRESS NOTES
Take 1 tablet by mouth every 8 hours as needed 5/14/20  Yes Automatic Reconciliation, Ar   triamcinolone (KENALOG) 0.1 % ointment Apply topically 2 times daily 11/24/21  Yes Automatic Reconciliation, Ar         No Known Allergies      REVIEW OF SYSTEMS:  Per HPI    PHYSICAL EXAM:  BP (!) 150/80   Pulse 75   Temp 98.2 °F (36.8 °C) (Oral)   Resp 12   Ht 1.422 m (4' 8\")   Wt 55.6 kg (122 lb 8 oz)   SpO2 96%   BMI 27.46 kg/m²   Constitutional: Appears well-developed and well-nourished. No distress.   HENT:   Head: Normocephalic and atraumatic.   Eyes: No scleral icterus.   Cardiovascular: Normal S1/S2, regular rhythm.  No murmurs, rubs, or gallops.  Pulmonary/Chest: Effort normal and breath sounds normal. No respiratory distress. No wheezes, rhonchi, or rales.   Ext: No edema.   Neurological: Alert.   Psychiatric: Normal mood and affect. Behavior is normal.     Lab Results   Component Value Date/Time     07/25/2023 10:17 AM    K 4.4 07/25/2023 10:17 AM     07/25/2023 10:17 AM    CO2 24 07/25/2023 10:17 AM    BUN 31 07/25/2023 10:17 AM    GFRAA 34 08/31/2021 10:47 AM    ALT 12 07/25/2023 10:17 AM     No results found for: \"HBA1C\"   Lab Results   Component Value Date/Time    CHOL 163 07/25/2023 10:17 AM    CHOL 143 07/13/2022 11:08 AM    HDL 71 07/25/2023 10:17 AM    VLDL 19 07/13/2022 11:08 AM          ASSESSMENT/PLAN  Marialuisa was seen today for hypertension.    Diagnoses and all orders for this visit:    Essential hypertension    Needs flu shot  -     Influenza, FLUAD, (age 65 y+), IM, Preservative Free, 0.5 mL    Stage 3b chronic kidney disease (HCC)    Frequent falls      CKD has been stable - check labs next visit  BP high but difficult to control and much better on repeat - continue current medications  Discussed fall prevention    Health Maintenance Due   Topic Date Due    DTaP/Tdap/Td vaccine (1 - Tdap) Never done    Respiratory Syncytial Virus (RSV) Pregnant or age 60 yrs+ (1 - 1-dose 60+ series)

## 2024-01-18 RX ORDER — FUROSEMIDE 20 MG/1
TABLET ORAL
Qty: 90 TABLET | Refills: 3 | Status: SHIPPED | OUTPATIENT
Start: 2024-01-18

## 2024-01-18 NOTE — TELEPHONE ENCOUNTER
PCP: Rosalind Nagy MD     Last appt:  1/10/2024      Future Appointments   Date Time Provider Department Center   7/12/2024 11:50 AM Rosalind Nagy MD [facility] AMB          Requested Prescriptions     Pending Prescriptions Disp Refills    furosemide (LASIX) 20 MG tablet [Pharmacy Med Name: FUROSEMIDE TABS 20MG] 90 tablet 3     Sig: TAKE 1 TABLET DAILY

## 2024-02-02 RX ORDER — FAMOTIDINE 20 MG/1
TABLET, FILM COATED ORAL
Qty: 90 TABLET | Refills: 3 | Status: SHIPPED | OUTPATIENT
Start: 2024-02-02

## 2024-02-02 NOTE — TELEPHONE ENCOUNTER
PCP: Rosalind Nagy MD     Last appt:  1/10/2024      Future Appointments   Date Time Provider Department Center   7/12/2024 11:50 AM Rosalind Nagy MD [facility] AMB          Requested Prescriptions     Pending Prescriptions Disp Refills    famotidine (PEPCID) 20 MG tablet [Pharmacy Med Name: FAMOTIDINE TABS 20MG] 90 tablet 3     Sig: TAKE 1 TABLET NIGHTLY

## 2024-03-15 RX ORDER — OMEPRAZOLE 20 MG/1
CAPSULE, DELAYED RELEASE ORAL
Qty: 90 CAPSULE | Refills: 3 | Status: SHIPPED | OUTPATIENT
Start: 2024-03-15

## 2024-03-15 NOTE — TELEPHONE ENCOUNTER
PCP: Rosalind Nagy MD     Last appt:  1/10/2024      Future Appointments   Date Time Provider Department Center   7/12/2024 11:50 AM Rosalind Nagy MD [facility] AMB          Requested Prescriptions     Pending Prescriptions Disp Refills    omeprazole (PRILOSEC) 20 MG delayed release capsule [Pharmacy Med Name: OMEPRAZOLE DR CAPS 20MG] 90 capsule 3     Sig: TAKE 1 CAPSULE DAILY

## 2024-03-27 ENCOUNTER — APPOINTMENT (OUTPATIENT)
Facility: HOSPITAL | Age: 89
DRG: 064 | End: 2024-03-27
Payer: MEDICARE

## 2024-03-27 ENCOUNTER — HOSPITAL ENCOUNTER (INPATIENT)
Facility: HOSPITAL | Age: 89
LOS: 1 days | Discharge: HOSPICE | DRG: 064 | End: 2024-03-28
Attending: EMERGENCY MEDICINE | Admitting: INTERNAL MEDICINE
Payer: MEDICARE

## 2024-03-27 DIAGNOSIS — I61.9 INTRAPARENCHYMAL HEMORRHAGE OF BRAIN (HCC): Primary | ICD-10-CM

## 2024-03-27 PROBLEM — I61.0 BASAL GANGLIA HEMORRHAGE (HCC): Status: ACTIVE | Noted: 2024-03-27

## 2024-03-27 LAB
ALBUMIN SERPL-MCNC: 3.4 G/DL (ref 3.5–5)
ALBUMIN/GLOB SERPL: 0.8 (ref 1.1–2.2)
ALP SERPL-CCNC: 125 U/L (ref 45–117)
ALT SERPL-CCNC: 16 U/L (ref 12–78)
ANION GAP SERPL CALC-SCNC: 7 MMOL/L (ref 5–15)
AST SERPL-CCNC: 20 U/L (ref 15–37)
BASOPHILS # BLD: 0.1 K/UL (ref 0–0.1)
BASOPHILS NFR BLD: 1 % (ref 0–1)
BILIRUB SERPL-MCNC: 0.9 MG/DL (ref 0.2–1)
BUN SERPL-MCNC: 23 MG/DL (ref 6–20)
BUN/CREAT SERPL: 21 (ref 12–20)
CALCIUM SERPL-MCNC: 9.3 MG/DL (ref 8.5–10.1)
CHLORIDE SERPL-SCNC: 108 MMOL/L (ref 97–108)
CO2 SERPL-SCNC: 25 MMOL/L (ref 21–32)
CREAT SERPL-MCNC: 1.1 MG/DL (ref 0.55–1.02)
DIFFERENTIAL METHOD BLD: NORMAL
EOSINOPHIL # BLD: 0.1 K/UL (ref 0–0.4)
EOSINOPHIL NFR BLD: 1 % (ref 0–7)
ERYTHROCYTE [DISTWIDTH] IN BLOOD BY AUTOMATED COUNT: 11.8 % (ref 11.5–14.5)
GLOBULIN SER CALC-MCNC: 4.2 G/DL (ref 2–4)
GLUCOSE BLD STRIP.AUTO-MCNC: 125 MG/DL (ref 65–117)
GLUCOSE SERPL-MCNC: 133 MG/DL (ref 65–100)
HCT VFR BLD AUTO: 39.2 % (ref 35–47)
HGB BLD-MCNC: 12.6 G/DL (ref 11.5–16)
IMM GRANULOCYTES # BLD AUTO: 0 K/UL (ref 0–0.04)
IMM GRANULOCYTES NFR BLD AUTO: 0 % (ref 0–0.5)
INR PPP: 1 (ref 0.9–1.1)
LYMPHOCYTES # BLD: 2.2 K/UL (ref 0.8–3.5)
LYMPHOCYTES NFR BLD: 33 % (ref 12–49)
MCH RBC QN AUTO: 30.3 PG (ref 26–34)
MCHC RBC AUTO-ENTMCNC: 32.1 G/DL (ref 30–36.5)
MCV RBC AUTO: 94.2 FL (ref 80–99)
MONOCYTES # BLD: 0.6 K/UL (ref 0–1)
MONOCYTES NFR BLD: 8 % (ref 5–13)
NEUTS SEG # BLD: 3.8 K/UL (ref 1.8–8)
NEUTS SEG NFR BLD: 57 % (ref 32–75)
NRBC # BLD: 0 K/UL (ref 0–0.01)
NRBC BLD-RTO: 0 PER 100 WBC
PLATELET # BLD AUTO: 184 K/UL (ref 150–400)
PMV BLD AUTO: 10.4 FL (ref 8.9–12.9)
POTASSIUM SERPL-SCNC: 3.9 MMOL/L (ref 3.5–5.1)
PROT SERPL-MCNC: 7.6 G/DL (ref 6.4–8.2)
PROTHROMBIN TIME: 10.3 SEC (ref 9–11.1)
RBC # BLD AUTO: 4.16 M/UL (ref 3.8–5.2)
SERVICE CMNT-IMP: ABNORMAL
SODIUM SERPL-SCNC: 140 MMOL/L (ref 136–145)
TROPONIN I SERPL HS-MCNC: 11 NG/L (ref 0–51)
WBC # BLD AUTO: 6.6 K/UL (ref 3.6–11)

## 2024-03-27 PROCEDURE — 6360000002 HC RX W HCPCS: Performed by: EMERGENCY MEDICINE

## 2024-03-27 PROCEDURE — 70450 CT HEAD/BRAIN W/O DYE: CPT

## 2024-03-27 PROCEDURE — 85025 COMPLETE CBC W/AUTO DIFF WBC: CPT

## 2024-03-27 PROCEDURE — 71045 X-RAY EXAM CHEST 1 VIEW: CPT

## 2024-03-27 PROCEDURE — 85610 PROTHROMBIN TIME: CPT

## 2024-03-27 PROCEDURE — 82962 GLUCOSE BLOOD TEST: CPT

## 2024-03-27 PROCEDURE — 2580000003 HC RX 258: Performed by: INTERNAL MEDICINE

## 2024-03-27 PROCEDURE — APPNB45 APP NON BILLABLE 31-45 MINUTES: Performed by: NURSE PRACTITIONER

## 2024-03-27 PROCEDURE — 84484 ASSAY OF TROPONIN QUANT: CPT

## 2024-03-27 PROCEDURE — 99285 EMERGENCY DEPT VISIT HI MDM: CPT

## 2024-03-27 PROCEDURE — 96375 TX/PRO/DX INJ NEW DRUG ADDON: CPT

## 2024-03-27 PROCEDURE — 6360000002 HC RX W HCPCS: Performed by: INTERNAL MEDICINE

## 2024-03-27 PROCEDURE — 36415 COLL VENOUS BLD VENIPUNCTURE: CPT

## 2024-03-27 PROCEDURE — 96376 TX/PRO/DX INJ SAME DRUG ADON: CPT

## 2024-03-27 PROCEDURE — 2000000000 HC ICU R&B

## 2024-03-27 PROCEDURE — 93005 ELECTROCARDIOGRAM TRACING: CPT | Performed by: EMERGENCY MEDICINE

## 2024-03-27 PROCEDURE — 80053 COMPREHEN METABOLIC PANEL: CPT

## 2024-03-27 PROCEDURE — 96374 THER/PROPH/DIAG INJ IV PUSH: CPT

## 2024-03-27 PROCEDURE — 6360000004 HC RX CONTRAST MEDICATION: Performed by: RADIOLOGY

## 2024-03-27 PROCEDURE — 2580000003 HC RX 258: Performed by: EMERGENCY MEDICINE

## 2024-03-27 PROCEDURE — APPNB45 APP NON BILLABLE 31-45 MINUTES

## 2024-03-27 PROCEDURE — 70498 CT ANGIOGRAPHY NECK: CPT

## 2024-03-27 PROCEDURE — 4A03X5D MEASUREMENT OF ARTERIAL FLOW, INTRACRANIAL, EXTERNAL APPROACH: ICD-10-PCS | Performed by: INTERNAL MEDICINE

## 2024-03-27 RX ORDER — ONDANSETRON 4 MG/1
4 TABLET, ORALLY DISINTEGRATING ORAL EVERY 8 HOURS PRN
Status: DISCONTINUED | OUTPATIENT
Start: 2024-03-27 | End: 2024-03-28 | Stop reason: HOSPADM

## 2024-03-27 RX ORDER — HYDRALAZINE HYDROCHLORIDE 20 MG/ML
10 INJECTION INTRAMUSCULAR; INTRAVENOUS EVERY 6 HOURS PRN
Status: DISCONTINUED | OUTPATIENT
Start: 2024-03-27 | End: 2024-03-28 | Stop reason: HOSPADM

## 2024-03-27 RX ORDER — LABETALOL HYDROCHLORIDE 5 MG/ML
10 INJECTION, SOLUTION INTRAVENOUS EVERY 6 HOURS
Status: DISCONTINUED | OUTPATIENT
Start: 2024-03-27 | End: 2024-03-28 | Stop reason: HOSPADM

## 2024-03-27 RX ORDER — SODIUM CHLORIDE 0.9 % (FLUSH) 0.9 %
5-40 SYRINGE (ML) INJECTION EVERY 12 HOURS SCHEDULED
Status: DISCONTINUED | OUTPATIENT
Start: 2024-03-27 | End: 2024-03-28 | Stop reason: HOSPADM

## 2024-03-27 RX ORDER — HYDRALAZINE HYDROCHLORIDE 20 MG/ML
10 INJECTION INTRAMUSCULAR; INTRAVENOUS EVERY 6 HOURS
Status: DISCONTINUED | OUTPATIENT
Start: 2024-03-27 | End: 2024-03-27

## 2024-03-27 RX ORDER — ACETAMINOPHEN 325 MG/1
650 TABLET ORAL EVERY 4 HOURS PRN
Status: DISCONTINUED | OUTPATIENT
Start: 2024-03-27 | End: 2024-03-28 | Stop reason: HOSPADM

## 2024-03-27 RX ORDER — ONDANSETRON 2 MG/ML
4 INJECTION INTRAMUSCULAR; INTRAVENOUS ONCE
Status: COMPLETED | OUTPATIENT
Start: 2024-03-27 | End: 2024-03-27

## 2024-03-27 RX ORDER — ONDANSETRON 2 MG/ML
4 INJECTION INTRAMUSCULAR; INTRAVENOUS EVERY 6 HOURS PRN
Status: DISCONTINUED | OUTPATIENT
Start: 2024-03-27 | End: 2024-03-28 | Stop reason: HOSPADM

## 2024-03-27 RX ORDER — SODIUM CHLORIDE 0.9 % (FLUSH) 0.9 %
5-40 SYRINGE (ML) INJECTION PRN
Status: DISCONTINUED | OUTPATIENT
Start: 2024-03-27 | End: 2024-03-28 | Stop reason: HOSPADM

## 2024-03-27 RX ORDER — LABETALOL HYDROCHLORIDE 5 MG/ML
10 INJECTION, SOLUTION INTRAVENOUS EVERY 4 HOURS
Status: DISCONTINUED | OUTPATIENT
Start: 2024-03-27 | End: 2024-03-27

## 2024-03-27 RX ORDER — LABETALOL HYDROCHLORIDE 5 MG/ML
10 INJECTION, SOLUTION INTRAVENOUS EVERY 6 HOURS
Status: DISCONTINUED | OUTPATIENT
Start: 2024-03-27 | End: 2024-03-27

## 2024-03-27 RX ORDER — SODIUM CHLORIDE 9 MG/ML
INJECTION, SOLUTION INTRAVENOUS PRN
Status: DISCONTINUED | OUTPATIENT
Start: 2024-03-27 | End: 2024-03-28 | Stop reason: HOSPADM

## 2024-03-27 RX ORDER — LABETALOL HYDROCHLORIDE 5 MG/ML
10 INJECTION, SOLUTION INTRAVENOUS EVERY 6 HOURS
Status: DISCONTINUED | OUTPATIENT
Start: 2024-03-28 | End: 2024-03-27

## 2024-03-27 RX ADMIN — ONDANSETRON 4 MG: 2 INJECTION INTRAMUSCULAR; INTRAVENOUS at 15:07

## 2024-03-27 RX ADMIN — IOPAMIDOL 20 ML: 755 INJECTION, SOLUTION INTRAVENOUS at 15:02

## 2024-03-27 RX ADMIN — SODIUM CHLORIDE 11 MG/HR: 9 INJECTION, SOLUTION INTRAVENOUS at 15:54

## 2024-03-27 RX ADMIN — SODIUM CHLORIDE, PRESERVATIVE FREE 10 ML: 5 INJECTION INTRAVENOUS at 21:24

## 2024-03-27 RX ADMIN — LABETALOL HYDROCHLORIDE 10 MG: 5 INJECTION, SOLUTION INTRAVENOUS at 20:45

## 2024-03-27 RX ADMIN — SODIUM CHLORIDE 5 MG/HR: 9 INJECTION, SOLUTION INTRAVENOUS at 15:07

## 2024-03-27 RX ADMIN — IOPAMIDOL 80 ML: 755 INJECTION, SOLUTION INTRAVENOUS at 15:01

## 2024-03-27 RX ADMIN — SODIUM CHLORIDE 6 MG/HR: 9 INJECTION, SOLUTION INTRAVENOUS at 16:50

## 2024-03-27 ASSESSMENT — LIFESTYLE VARIABLES
HOW MANY STANDARD DRINKS CONTAINING ALCOHOL DO YOU HAVE ON A TYPICAL DAY: PATIENT DOES NOT DRINK
HOW OFTEN DO YOU HAVE A DRINK CONTAINING ALCOHOL: NEVER

## 2024-03-27 ASSESSMENT — PAIN SCALES - GENERAL: PAINLEVEL_OUTOF10: 0

## 2024-03-27 NOTE — PROGRESS NOTES
Neurocritical Care Code Stroke Documentation      Symptoms: Right-side weakness, facial droop, mute   Baseline mRS:      Last Known Well: 1200   Medical hx: Past Medical History:   Diagnosis Date    Arthritis     Beta-blocker intolerance     Chronic pain     BACK    GERD (gastroesophageal reflux disease)     History of blood transfusion 3/2013    [facility], NO REACTION    Hypertension     Osteoporosis     Thromboembolus (HCC)     RT. GROIN    Ulcerative colitis (HCC)     no symptoms since       Vitals: There were no vitals filed for this visit.   Anticoagulation:  None reported   VAN:   Positive   NIHSS:   1a-LOC:0    1b-Month/Age:2    1c-Open/Close Hand:0    2-Best Gaze:1    3-Visual Fields:0    4-Facial Palsy:2    5a-Left Arm:0    5b-Right Arm:3    6a-Left Le    6b-Right Leg:3    7-Limb Ataxia:0    8-Sensory:0    9-Best Language:3    10-Dysarthria:2    11-Extinction/Inattention:0  TOTAL SCORE:16   Imaging (personally reviewed):   CT - Hypodensity seen in left basal ganglia consistent w/ acute hemorrhage    CTA   Plan:   TNK Candidate: NO    Mechanical thrombectomy Candidate: NO  Pt presents to [facility] ED via EMS w/ symptoms of right-side weakness, facial droop, inability to speak. EMS reported /90, blood sugar 120.  Per family, pt went up to use the bathroom at home around 1200. When she didn't return, they went to check on her and found that she'd fallen in the bathroom and had aforementioned symptoms.  Assessed w/ teleneurology.  Recommendation for admission, SBP<160 (avoid rapid decrease in systolic BP), NSGY and Neurology consults.    *Perform dysphagia screening prior to any PO intake*     Discussed with: Dr. Ventura    Arrival time: [facility] upon arrival 1430    I have spent 35 of critical care time involved in lab review, consultations with specialist, family decision making and documentation. During this entire length of time I was immediately available to the patient.    Amina Del Valle

## 2024-03-27 NOTE — CONSULTS
Neurosurgery Progress Note            Admit Date: 3/27/2024   LOS: 0 days        Daily Progress Note: 3/27/2024      Subjective:   The patient lives with her daughter and is normally active, able to take care of her own ADLs. The patient went to the bathroom and didn't return. The patient's family went to check on her when she didn't come back and found her on the ground with right sided weakness and inability to speak. Her BP upon arrival of EMS was 220/90. She takes a baby aspirin daily. Her head CT showed a left basal ganglia hemorrhage. On CTA there was concern for active extravasation. In the ER, the patient is having difficulty controlling secretions and remains hypertensive.    Objective:     Vital signs  Temp (24hrs), Av.8 °F (36.6 °C), Min:97.8 °F (36.6 °C), Max:97.8 °F (36.6 °C)    07 -  1900  In: 30.9 [I.V.:30.9]  Out: -   No intake/output data recorded.    BP (!) 143/40   Pulse (!) 105   Temp 97.8 °F (36.6 °C) (Oral)   Resp 14   Ht 1.422 m (4' 8\")   Wt 61.5 kg (135 lb 9.6 oz)   SpO2 98%   BMI 30.40 kg/m²          Pain control       PT/OT  Gait                 Physical Exam:  Gen:Sitting up with mouth open.   Neuro: Eyes open spontaneously. Does not follow commands. Non-verbal. Affect flat.   PERRL. Left gaze preference. Right central facial droop.  Right hemiplegia. Spontaneous movement on left side.  Gait deferred.    24 hour results:    Recent Results (from the past 24 hour(s))   POCT Glucose    Collection Time: 24  2:33 PM   Result Value Ref Range    POC Glucose 125 (H) 65 - 117 mg/dL    Performed by: Mireya Morrow    CBC with Auto Differential    Collection Time: 24  2:41 PM   Result Value Ref Range    WBC 6.6 3.6 - 11.0 K/uL    RBC 4.16 3.80 - 5.20 M/uL    Hemoglobin 12.6 11.5 - 16.0 g/dL    Hematocrit 39.2 35.0 - 47.0 %    MCV 94.2 80.0 - 99.0 FL    MCH 30.3 26.0 - 34.0 PG    MCHC 32.1 30.0 - 36.5 g/dL    RDW 11.8 11.5 - 14.5 %    Platelets 184 150 - 400  13:46,  Vent. rate has increased BY  70 BPM  T wave amplitude has decreased in Anterolateral leads            Assessment:     Principal Problem:    Basal ganglia hemorrhage (HCC)  Resolved Problems:    * No resolved hospital problems. *      Plan:   Left basal ganglia hemorrhage with brain compression   - no surgical intervention   - neuro checks   - admit to ICU   - good BP control   - hold ASA   - repeat head CT in am   - PT/OT/Speech as able  Hypertensive emergency   - Presenting /90   - SBP<140   - Cardene PRN   - Intensivist following    Plan d/w Dr. Cifuentes. No surgical intervention for BGH. Discussed patient with intensivist, ER physician, nurse. Also discussed with daughter at bedside. Approached code status. Daughter states patient would not want to be intubated, receive chest compressions or electrical shock. She states she think the patient has a DNR at home and would like to make the patient a DNR for this hospital stay. Updated code status entered into the chart.      GEN Paris - NP

## 2024-03-27 NOTE — PROGRESS NOTES
ICH Documentation Note     Brief HPI: Pt presents to [facility] ED via EMS w/ symptoms of right-side weakness, facial droop, inability to speak. EMS reported /90, blood sugar 120. Per family, pt went up to use the bathroom at home around 1200. When she didn't return, they went to check on her and found that she'd fallen in the bathroom and had aforementioned symptoms.     Medical hx  Active Problems:    * No active hospital problems. *  Resolved Problems:    * No resolved hospital problems. *      Traumatic ICH?   YES (no ICH score indicated) / NO (see ICH score)  Unknown   ICH Score ON   ARRIVAL:    Robbins Berkowitz/Modified Martinez on arrival (if indicated):      2    Imaging:   CT Result (most recent):  CT HEAD WO CONTRAST 03/27/2024    Narrative  EXAM: CT CODE NEURO HEAD WO CONTRAST    INDICATION: Stroke    COMPARISON: None.    CONTRAST: None.    TECHNIQUE: Unenhanced CT of the head was performed using 5 mm images. Brain and  bone windows were generated. Coronal and sagittal reformats. CT dose reduction  was achieved through use of a standardized protocol tailored for this  examination and automatic exposure control for dose modulation.    FINDINGS:  The ventricles and sulci are normal in size, shape and configuration. Chronic  right basal ganglia lacunar infarct. Mild low-density in the adjacent white  matter.  There is a 5.1 x 2.5 x 2.9 cm acute left basal ganglia hemorrhage  extending into the adjacent left frontal lobe. Estimated volume 18.4 cc. Slight  mass effect on the adjacent left lateral ventricle. The basilar cisterns are  open. No CT evidence of acute infarct.    The bone windows demonstrate no abnormalities. The visualized portions of the  paranasal sinuses and mastoid air cells are clear.    Impression  1. Acute left basal ganglia and left frontal lobe parenchymal hemorrhage      Findings discussed with Dr. Ventura at 1446 hours       Plan:  Neurology consult    NSGY consult   SBP goal <140     Time spent:

## 2024-03-27 NOTE — H&P
CRITICAL CARE ADMISSION NOTE      Name: Marialuisa Hadley   : 1930   MRN: [id number]   Date: 3/27/2024      Reason for ICU Admission: basal ganglia hemorrhage    ASSESSMENT and PLAN   Left basal ganglia hemorrhage:  -portends a poor prognosis  -admit to ICU  -neuro checks q 1 hr  -SBP < 140  -dc ASA  -PT OT SLP     Hypertensive emergency:  -resistant hypertension  -BP on admit 220/90  -cont cardene, started in ED  -prns in place   -schedule hydralazine to ease the need for cardene     NPO  SCDS  No AC or APT      Discussed with neurosurgery.    Prognosis is Poor.  DNR        HISTORY OF PRESENT ILLNESS:     Ms Goldberg is a 89 yof who lives independently with her daughter, admitted to ICU for acute left basal ganglia hemorrhage.      Found down in the bathroom by her daughter this am.  HCT in ED revealed acute IPH with IVH.  Concern she may still be bleeding.      Resistant hypertension felt to be etiology.  Hard to control in ED despite max cardene.      Not a surgical candidate per NSY.      ICU DAILY CHECKLIST     Code Status:DNR  DVT Prophylaxis: SCDs  T/L/D: PIVs  SUP: pepcid IV  Diet: NPO  Activity Level: as tolerated  ABCDEF Bundle/Checklist Completed:Yes  Disposition: Stay in ICU  Multidisciplinary Rounds Completed:  Yes  Patient/Family Updated: Yes      Review of Systems:     Not able to obtain due to aMS        Past Medical History:      has a past medical history of Arthritis, Beta-blocker intolerance, Chronic pain, GERD (gastroesophageal reflux disease), History of blood transfusion, Hypertension, Osteoporosis, Thromboembolus (HCC), and Ulcerative colitis (HCC).    Past Surgical History:      has a past surgical history that includes Tubal ligation; lumbar fusion (3/2013); Tonsillectomy; Cataract removal (); dilate esophagus (11/10/2015); gi; and upper gi endoscopy,biopsy (11/10/2015).    Home Medications:     Prior to Admission medications    Medication Sig Start Date End Date Taking?  Authorizing Provider   omeprazole (PRILOSEC) 20 MG delayed release capsule TAKE 1 CAPSULE DAILY 3/15/24   Rosalind Nagy MD   famotidine (PEPCID) 20 MG tablet TAKE 1 TABLET NIGHTLY 2/2/24   Rosalind Nagy MD   furosemide (LASIX) 20 MG tablet TAKE 1 TABLET DAILY 1/18/24   Rosalind Nagy MD   amLODIPine (NORVASC) 5 MG tablet TAKE 1 TABLET DAILY 1/9/24   Rosalind Nagy MD   chlorthalidone (HYGROTON) 25 MG tablet TAKE ONE-HALF (1/2) TABLET DAILY 1/2/24   Rosalind Nagy MD   nebivolol (BYSTOLIC) 10 MG tablet TAKE 1 TABLET DAILY 9/21/23   Rosalind Nagy MD   celecoxib (CELEBREX) 200 MG capsule TAKE 1 CAPSULE DAILY 5/10/23   Rosalind Nagy MD   losartan (COZAAR) 50 MG tablet TAKE 1 TABLET TWICE A DAY 5/10/23   Rosalind Nagy MD   acetaminophen (TYLENOL) 500 MG tablet Take by mouth every 6 hours as needed    Automatic Reconciliation, Ar   aspirin 81 MG chewable tablet Take 1 tablet by mouth daily    Automatic Reconciliation, Ar   atorvastatin (LIPITOR) 20 MG tablet TAKE 1 TABLET DAILY 1/30/23   Automatic Reconciliation, Ar   Cholecalciferol 50 MCG (2000 UT) TABS Take by mouth daily    Automatic Reconciliation, Ar   ondansetron (ZOFRAN-ODT) 4 MG disintegrating tablet Take 1 tablet by mouth every 8 hours as needed 5/14/20   Automatic Reconciliation, Ar   triamcinolone (KENALOG) 0.1 % ointment Apply topically 2 times daily 11/24/21   Automatic Reconciliation, Ar       Allergies/Social/Family History:     No Known Allergies   Social History     Tobacco Use    Smoking status: Never    Smokeless tobacco: Never   Substance Use Topics    Alcohol use: No      Family History   Problem Relation Age of Onset    Heart Disease Mother     Diabetes Son     Cancer Brother         UNKNOWN    Liver Disease Sister     Cancer Father         UNKNOWN    Hypertension Mother     Anesth Problems Neg Hx     Other Sister         SCLERODERMA    Hypertension Daughter     Diabetes Son     Hypertension Son

## 2024-03-27 NOTE — ED NOTES
TRANSFER - OUT REPORT:    Verbal report given to ROCIO Sharp on Marialuisa Hadley  being transferred to ICU 7113 for routine progression of patient care       Report consisted of patient's Situation, Background, Assessment and   Recommendations(SBAR).     Information from the following report(s) Nurse Handoff Report, ED Encounter Summary, ED SBAR, Adult Overview, Intake/Output, MAR, Recent Results, and Neuro Assessment was reviewed with the receiving nurse.    [place] Fall Assessment:    Presents to emergency department  because of falls (Syncope, seizure, or loss of consciousness): Yes  Age > 70: Yes  Altered Mental Status, Intoxication with alcohol or substance confusion (Disorientation, impaired judgment, poor safety awaremess, or inability to follow instructions): Yes  Impaired Mobility: Ambulates or transfers with assistive devices or assistance; Unable to ambulate or transer.: Yes  Nursing Judgement: Yes          Lines:   Peripheral IV 03/27/24 Left Antecubital (Active)        Opportunity for questions and clarification was provided.      Patient transported with:  Monitor and Registered Nurse

## 2024-03-27 NOTE — ED NOTES
Consulted Dr. Vance in regards to patient's diastolic blood pressures being in the 30s-40s on the Nicardipine drip. Dr. Vance said to stay at 3.5 mg/hr of Nicardipine.

## 2024-03-27 NOTE — ED TRIAGE NOTES
Pt comes to ED from home via EMS as pre-alert code stroke level 1 van +.     LKW is not fully known per EMS. It is reported to be either 0800 or 1235. Pt with reported right side weakness, left side gaze, and aphasia.     EMS BS reported to be 120.     Pt vomited upon arrival to EMS bay per EMS.     Pt straight to CT. Neuro, NP and Dr Ventura in CT to assess pt.

## 2024-03-27 NOTE — ED PROVIDER NOTES
[facility] EMERGENCY DEP  EMERGENCY DEPARTMENT ENCOUNTER      Pt Name: Marialuisa Hadley  MRN: [id number]  Birthdate 6/19/1930  Date of evaluation: 3/27/2024  Provider: Mignon Ventura MD    CHIEF COMPLAINT       Chief Complaint   Patient presents with    Stroke         HISTORY OF PRESENT ILLNESS    Marialuisa Hadley is a 89 yo F with h/o HTN who was found in her bathroom by family on the floor with right side weakness and unable to speak.  Her daughter states that she was last normal at 12:30pm.  She takes aspirin but no other blood thinners.            Additional history from independent historians:     Review of External Medical Records:     Nursing Notes were reviewed.    REVIEW OF SYSTEMS       Review of Systems   Unable to perform ROS: Mental status change       Except as noted above the remainder of the review of systems was reviewed and negative.       PAST MEDICAL HISTORY     Past Medical History:   Diagnosis Date    Arthritis     Beta-blocker intolerance     Chronic pain     BACK    GERD (gastroesophageal reflux disease)     History of blood transfusion 3/2013    [facility], NO REACTION    Hypertension     Osteoporosis     Thromboembolus (HCC)     RT. GROIN    Ulcerative colitis (HCC)     no symptoms since 1980s         SURGICAL HISTORY       Past Surgical History:   Procedure Laterality Date    CATARACT REMOVAL  1990'S    BILATERAL    DILATE ESOPHAGUS  11/10/2015         GI      COLONOSCOPY    LUMBAR FUSION  3/2013    T10-S1 - Dr. Tatum and Dr. Jarrett    TONSILLECTOMY      TUBAL LIGATION      UPPER GI ENDOSCOPY,BIOPSY  11/10/2015              CURRENT MEDICATIONS       Previous Medications    ACETAMINOPHEN (TYLENOL) 500 MG TABLET    Take by mouth every 6 hours as needed    AMLODIPINE (NORVASC) 5 MG TABLET    TAKE 1 TABLET DAILY    ASPIRIN 81 MG CHEWABLE TABLET    Take 1 tablet by mouth daily    ATORVASTATIN (LIPITOR) 20 MG TABLET    TAKE 1 TABLET DAILY    CELECOXIB (CELEBREX) 200 MG CAPSULE    TAKE 1

## 2024-03-28 ENCOUNTER — APPOINTMENT (OUTPATIENT)
Facility: HOSPITAL | Age: 89
DRG: 064 | End: 2024-03-28
Payer: MEDICARE

## 2024-03-28 VITALS
HEIGHT: 56 IN | BODY MASS INDEX: 24.2 KG/M2 | RESPIRATION RATE: 13 BRPM | TEMPERATURE: 98 F | DIASTOLIC BLOOD PRESSURE: 53 MMHG | HEART RATE: 86 BPM | OXYGEN SATURATION: 92 % | SYSTOLIC BLOOD PRESSURE: 136 MMHG | WEIGHT: 107.58 LBS

## 2024-03-28 PROBLEM — I62.9 INTRACRANIAL HEMORRHAGE (HCC): Status: ACTIVE | Noted: 2024-03-28

## 2024-03-28 PROBLEM — I10 PRIMARY HYPERTENSION: Status: ACTIVE | Noted: 2024-01-01

## 2024-03-28 PROBLEM — Z51.5 HOSPICE CARE: Status: ACTIVE | Noted: 2024-03-28

## 2024-03-28 PROBLEM — R52 NONVERBAL SIGNS OF PAIN: Status: ACTIVE | Noted: 2024-03-28

## 2024-03-28 PROBLEM — R45.1 RESTLESSNESS AND AGITATION: Status: ACTIVE | Noted: 2024-01-01

## 2024-03-28 LAB
ANION GAP SERPL CALC-SCNC: 9 MMOL/L (ref 5–15)
BUN SERPL-MCNC: 21 MG/DL (ref 6–20)
BUN/CREAT SERPL: 21 (ref 12–20)
CALCIUM SERPL-MCNC: 8 MG/DL (ref 8.5–10.1)
CHLORIDE SERPL-SCNC: 112 MMOL/L (ref 97–108)
CHOLEST SERPL-MCNC: 149 MG/DL
CO2 SERPL-SCNC: 22 MMOL/L (ref 21–32)
CREAT SERPL-MCNC: 1.02 MG/DL (ref 0.55–1.02)
ERYTHROCYTE [DISTWIDTH] IN BLOOD BY AUTOMATED COUNT: 11.9 % (ref 11.5–14.5)
EST. AVERAGE GLUCOSE BLD GHB EST-MCNC: 111 MG/DL
GLUCOSE SERPL-MCNC: 117 MG/DL (ref 65–100)
HBA1C MFR BLD: 5.5 % (ref 4–5.6)
HCT VFR BLD AUTO: 35.6 % (ref 35–47)
HDLC SERPL-MCNC: 79 MG/DL
HDLC SERPL: 1.9 (ref 0–5)
HGB BLD-MCNC: 11.3 G/DL (ref 11.5–16)
LDLC SERPL CALC-MCNC: 60 MG/DL (ref 0–100)
MCH RBC QN AUTO: 31.1 PG (ref 26–34)
MCHC RBC AUTO-ENTMCNC: 31.7 G/DL (ref 30–36.5)
MCV RBC AUTO: 98.1 FL (ref 80–99)
NRBC # BLD: 0 K/UL (ref 0–0.01)
NRBC BLD-RTO: 0 PER 100 WBC
PLATELET # BLD AUTO: 160 K/UL (ref 150–400)
PMV BLD AUTO: 10.5 FL (ref 8.9–12.9)
POTASSIUM SERPL-SCNC: 4.7 MMOL/L (ref 3.5–5.1)
RBC # BLD AUTO: 3.63 M/UL (ref 3.8–5.2)
SODIUM SERPL-SCNC: 143 MMOL/L (ref 136–145)
TRIGL SERPL-MCNC: 50 MG/DL
VLDLC SERPL CALC-MCNC: 10 MG/DL
WBC # BLD AUTO: 7.6 K/UL (ref 3.6–11)

## 2024-03-28 PROCEDURE — 83036 HEMOGLOBIN GLYCOSYLATED A1C: CPT

## 2024-03-28 PROCEDURE — 99222 1ST HOSP IP/OBS MODERATE 55: CPT | Performed by: PSYCHIATRY & NEUROLOGY

## 2024-03-28 PROCEDURE — 2500000003 HC RX 250 WO HCPCS: Performed by: NURSE PRACTITIONER

## 2024-03-28 PROCEDURE — APPNB45 APP NON BILLABLE 31-45 MINUTES

## 2024-03-28 PROCEDURE — 80061 LIPID PANEL: CPT

## 2024-03-28 PROCEDURE — 70450 CT HEAD/BRAIN W/O DYE: CPT

## 2024-03-28 PROCEDURE — 80048 BASIC METABOLIC PNL TOTAL CA: CPT

## 2024-03-28 PROCEDURE — 85027 COMPLETE CBC AUTOMATED: CPT

## 2024-03-28 PROCEDURE — 6360000002 HC RX W HCPCS: Performed by: INTERNAL MEDICINE

## 2024-03-28 PROCEDURE — 36415 COLL VENOUS BLD VENIPUNCTURE: CPT

## 2024-03-28 RX ORDER — METOPROLOL TARTRATE 1 MG/ML
5 INJECTION, SOLUTION INTRAVENOUS EVERY 5 MIN PRN
Status: DISCONTINUED | OUTPATIENT
Start: 2024-03-28 | End: 2024-03-28 | Stop reason: HOSPADM

## 2024-03-28 RX ORDER — HYDROMORPHONE HYDROCHLORIDE 1 MG/ML
0.5 INJECTION, SOLUTION INTRAMUSCULAR; INTRAVENOUS; SUBCUTANEOUS
Status: DISCONTINUED | OUTPATIENT
Start: 2024-03-28 | End: 2024-03-28 | Stop reason: HOSPADM

## 2024-03-28 RX ADMIN — HYDRALAZINE HYDROCHLORIDE 10 MG: 20 INJECTION INTRAMUSCULAR; INTRAVENOUS at 00:09

## 2024-03-28 RX ADMIN — LABETALOL HYDROCHLORIDE 10 MG: 5 INJECTION, SOLUTION INTRAVENOUS at 03:10

## 2024-03-28 RX ADMIN — HYDRALAZINE HYDROCHLORIDE 10 MG: 20 INJECTION INTRAMUSCULAR; INTRAVENOUS at 07:13

## 2024-03-28 RX ADMIN — METOPROLOL TARTRATE 5 MG: 1 INJECTION, SOLUTION INTRAVENOUS at 01:05

## 2024-03-28 ASSESSMENT — PAIN SCALES - GENERAL
PAINLEVEL_OUTOF10: 0
PAINLEVEL_OUTOF10: 0

## 2024-03-28 NOTE — CONSULTS
Neurology Staff Addendum:  I have personally seen and examined the patient. I have personally reviewed the chart and images. Elements of my examination included history of present illness, review of systems, review of past medical and surgical history, review of medications, and physical and neurological examination. I have personally reviewed the findings and impressions with the nurse practitioner and am in agreement with their note with changes below.    Pt is a 89yo female admitted 3/27/24 after her daughter found her down in the bathroom. She is seen with her daughter at the bedside, pt is unable to provide history. The pt's daughter heard her call her name, but pt has not said anything that can be understood since then.  She has HTN an BP was 220/90 at presentation. CTH with acute left BG and frontal IPH. CTA H/N mod right ICA and mod to severe left ICA stenosis, No LVO, no aneurysm or vascular malformation. CTH today with stable IPH with small I'VE, right BG lacunar infarct, CIWM changes. Pt has been in Afib since admission, a new diagnosis. She was not on APT/OAC at home. No smoking.  Family discussed poor prognosis with NSG yesterday and have now decided to make her comfort care and she is being discharged to [facility].     Blood pressure (!) 136/53, pulse 86, temperature 98 °F (36.7 °C), temperature source Axillary, resp. rate 13, height 1.422 m (4' 8\"), weight 48.8 kg (107 lb 9.4 oz), SpO2 92 %.    Physical Exam:  General: Well developed well nourished elderly patient in no apparent distress.   Cardiac: IRIR  Carotids: 2+ symmetric, no bruits  Extremities: 2+ Radial pulses, no cyanosis or edema    Neurological Exam:  Mental Status: Opens eyes to verbal stim, tracks examiner, smiles, attempts to verbalize, but using nonsensical words. Follows command to    Cranial Nerves:   PERRL, right facial weakness   Motor:  Not moving right side,  well with left hand   Reflexes:   Remainder  craniocervical junction.    Impression  1. CTA head demonstrates at least 2 foci of active bleeding into the left  gangliocapsular intraparenchymal hematoma as detailed. There is no associated  aneurysm, obvious high flow vascular malformation or enhancing mass lesion.  No large vessel occlusion or high-grade stenosis.  2. CTA neck demonstrates at least moderate right and moderate to severe left ICA  ostial stenosis, suboptimally evaluated due to densely calcified plaques.  No large vessel occlusion scratch or aneurysm.    Others:  3. Elongated bilateral styloid process/stylohyoid ligament ossification which is  nonspecific but may be symptomatic in some setting. Clinical correlation  advised.    4. Cervical spondylosis as detailed.    The critical findings were discussed with, and confirmed by Dr. Flor Ventura,  in the ER over the phone  by Dr. Ofelia Maldonado at 3:35 p.m. on 3/27/2024.        MRI Results (maximum last 3):  @BSHSILASTIMGCAT(QUR9747:3)@    Lab Review  Lab Results   Component Value Date/Time    WBC 6.6 03/27/2024 02:41 PM    HCT 39.2 03/27/2024 02:41 PM    HGB 12.6 03/27/2024 02:41 PM     03/27/2024 02:41 PM     Lab Results   Component Value Date/Time     03/27/2024 02:41 PM    K 3.9 03/27/2024 02:41 PM     03/27/2024 02:41 PM    CO2 25 03/27/2024 02:41 PM    BUN 23 03/27/2024 02:41 PM       Signed:  EDWARDO Kamara  3/28/2024  2:31 AM

## 2024-03-28 NOTE — PROGRESS NOTES
New afib    Start labetalol 10 q 6 h scheduled    Change hydralazine to prn    May be able to wean off cardene

## 2024-03-28 NOTE — PROGRESS NOTES
[facility] ICU Progress Note        Marialuisa Hadley  6/19/1930  [id number]  3/28/2024      Assessment and plan:  Left basal ganglia hemorrhage:  -decrease neurochecks to q 4  -SBP < 140  -dc ASA     Hypertensive emergency:  improved.  Off cardene gtt    -resistant hypertension  -off cardene.  Cont scheduled labetalol   -prns in place     Afib:  -new onset   -converted post BB     NPO  SCDS  No AC or APT       Discussed with neurosurgery.    Prognosis is Poor.  DNR      Hospice consulted.       Discussed with case management.  Discussed with neurosurgery.      HPI:  She continues to have significant neurological deficits.  She is aphasic.  Cannot swallow or eat.  Right side remains hemiparetic.      This hemorrhage is catastrophic for this sweet lady.        ICU DAILY CHECKLIST     Code Status:DNR  DVT Prophylaxis:SCDs  T/L/D: PIVs  SUP: not indicated  Diet: NPO   Activity Level: as tolerated  ABCDEF Bundle/Checklist Completed:Yes  Disposition: consult hospice.    Multidisciplinary Rounds Completed: yes  Goals of Care Discussion/Palliative: yes  Patient/Family Updated: yes      OBJECTIVE  Vitals:    03/28/24 0400 03/28/24 0500 03/28/24 0600 03/28/24 0700   BP: (!) 130/45 (!) 140/55 (!) 138/53 (!) 148/57   Pulse: 69 75 81 82   Resp: 10 10 12 12   Temp: 97.7 °F (36.5 °C)      TempSrc: Oral      SpO2: 95% 95% 95% 96%   Weight:       Height:         EXAM: . Awake and alert.  Not able to communicate.    HEENT  -Head: NC/AT;  -Eyes: PERRL, EOMI. No discharge or redness;  -Ears: External ears are normal. Normal TMs.  -Nose: Normal nares.  -Mouth and throat: MMM. Normal gums, mucosa, palate,. Good dentition.  NECK: Supple, with no masses. No JVD   CV: RRR, no m/r/g.  LUNGS: CTAB, no w/r/c.  ABD: Soft, NT/ND, no masses, NTTP  SKIN: Warm, well perfused. No skin rashes or abnormal lesions.  EXT: No clubbing, cyanosis, or edema.  NEURO: right sided hemiparesis.      LABS:   Na 143  Cl 112  Cr 1.02    WBC 7.6  Hb

## 2024-03-28 NOTE — DISCHARGE SUMMARY
Discharge Summary     Patient: Marialuisa Hadley       MRN: [id number]       YOB: 1930       Age: 89 y.o.     Date of admission:  3/27/2024    Date of discharge:  3/28/2024    Primary care provider:  Rosalind Nagy MD     Admitting provider:  Valerie Vance MD    Discharging provider(s): Valerie Vance MD - Staff Intensivist - [facility]     Consultations  IP CONSULT TO TELE-NEUROLOGY  IP CONSULT TO NEUROSURGERY  IP CONSULT TO INTENSIVIST  IP CONSULT TO NEUROSURGERY  IP CONSULT TO NEUROLOGY  IP CONSULT TO HOSPICE    Procedures  None     Discharge Condition: stable .  Discharge Destination: hospice house.  The patient is stable for discharge.    Admission diagnosis  Basal ganglia hemorrhage (HCC) [I61.0]  Intraparenchymal hemorrhage of brain (HCC) [I61.9]    Please refer to the admission history and physical for details on the presenting problem.     Final discharge diagnoses and brief hospital course    Basal ganglia hemorrhage   Devastating CVA  Right sided hemiplegia  Aphasia  Dysphagia  Malignant hypertension    Left basal ganglia hemorrhage:  -decrease neurochecks to q 4  -SBP < 140  -dc ASA     Hypertensive emergency:  improved.  Off cardene gtt    -resistant hypertension  -off cardene.  Cont scheduled labetalol   -prns in place      Afib:  -new onset   -converted post BB  -on labetatol for now      DNR     Physical examination at discharge  Vitals:    03/28/24 1200   BP: (!) 144/52   Pulse: 80   Resp: 13   Temp: 98 °F (36.7 °C)   SpO2: 94%     Aphasia  Comfortable   Dysphagia  Right sided hemiplegia      Recent Labs     03/27/24  1441 03/28/24  0332   WBC 6.6 7.6   HGB 12.6 11.3*   HCT 39.2 35.6    160     Recent Labs     03/27/24  1441 03/28/24  0332    143   K 3.9 4.7    112*   CO2 25 22   BUN 23* 21*     Recent Labs     03/27/24  1441   GLOB 4.2*     Recent Labs     03/27/24  1441   INR 1.0        Pertinent imaging studies:  Head CT shows large basal ganglia

## 2024-03-28 NOTE — PROGRESS NOTES
Physical Therapy 3/28/24    Chart reviewed, conferred with RN. Patient transitioning to hospice. Will sign off.    Thank you for your consideration,    Tomeka Christie, PT, DPT

## 2024-03-28 NOTE — CARE COORDINATION
Care Management Initial Assessment       RUR: 11 %   Readmission? No  1st IM letter given? No     03/28/24 0952   Service Assessment   Patient Orientation Other (see comment)  (Minimally responsive due to Left basal ganglia hemorrhage:)   Cognition Other (see comment)  (Minimally responsive r/t Left basal ganglia hemorrhage:)   History Provided By Other (see comment);Medical Record  (Nursing and EMR)   Primary Caregiver Family  (Lives with sonya Hadley)   Support Systems Children   Patient's Healthcare Decision Maker is: Named in Scanned ACP Document  (Sonya Hadley)   Prior Functional Level Independent in ADLs/IADLs   Current Functional Level Assistance with the following:;Bathing;Dressing;Toileting;Feeding;Mobility   Can patient return to prior living arrangement Unknown at present   Ability to make needs known: Unable   Family able to assist with home care needs: Other (comment)  (Unknown)   Would you like for me to discuss the discharge plan with any other family members/significant others, and if so, who? Yes  (Sonya Hadley)   Financial Resources Medicare  ([facility] Medicare)   Community Resources None   Social/Functional History   Lives With Daughter     Patient presented to the ED from home after being found down in the Bathroom by family. CT: acute left basal ganglia hemorrhage.   Code status: DNR  Hospice consult noted, referral made to [facility] via [facility].   Available if needed.  Melina Cross RN,Care Management    2:30 pm Patient being discharged to [facility]. Transport arranged through [employer] for stretcher transport. ETA 3:00 pm Faxed PCS and Face sheet to [phone].  Melina Cross RN,Care Management

## 2024-03-28 NOTE — PROGRESS NOTES
[facility]  Good Help to Those in Need  [phone]     Patient Name: Marialuisa Hadley  YOB: 1930  Age: 89 y.o.    [facility] RN Note:  Met with dtr at bedside, she is agreeable to GIP admission at [facility]. CTI- [facility] per Dr. Talley.   Transport- CM to set up    Thank you for the opportunity to be of service to this patient.    ELAN BentonN, RN  Clinical Nurse Liaison  [facility]  [phone]  Office: [phone]  Available on [employer]

## 2024-03-28 NOTE — PROGRESS NOTES
[facility]  Good Help to Those in Need  [phone]     Patient Name: Marialuisa Hadley  YOB: 1930  Age: 89 y.o.    [facility] RN Note:  Hospice consult received, reviewing chart. Will follow up with Unit Nurse and Care Manager to discuss plan of care, patient status and discharge disposition within the hour.     Thank you for the opportunity to be of service to this patient.    STEF Benton, RN  Clinical Nurse Liaison  [facility]  [phone]  Office: [phone]  Available on [employer]

## 2024-03-28 NOTE — PROGRESS NOTES
Consents completed with daughter/MPOISIAH Clemente at bedside for [facility] GIP transfer.  FH is TBD, family looking up the name of FH in [place].  Pt's   5 years ago on home hospice so pt's decline is not too much of a shock to family.  Family want pt to be comfortable.

## 2024-03-29 LAB
EKG ATRIAL RATE: 122 BPM
EKG DIAGNOSIS: NORMAL
EKG P-R INTERVAL: 200 MS
EKG Q-T INTERVAL: 306 MS
EKG QRS DURATION: 82 MS
EKG QTC CALCULATION (BAZETT): 436 MS
EKG R AXIS: 50 DEGREES
EKG T AXIS: 75 DEGREES
EKG VENTRICULAR RATE: 122 BPM

## 2024-03-29 PROCEDURE — 93010 ELECTROCARDIOGRAM REPORT: CPT | Performed by: SPECIALIST
